# Patient Record
Sex: FEMALE | Race: WHITE | NOT HISPANIC OR LATINO | Employment: PART TIME | URBAN - METROPOLITAN AREA
[De-identification: names, ages, dates, MRNs, and addresses within clinical notes are randomized per-mention and may not be internally consistent; named-entity substitution may affect disease eponyms.]

---

## 2024-02-19 ENCOUNTER — APPOINTMENT (EMERGENCY)
Dept: RADIOLOGY | Facility: HOSPITAL | Age: 59
DRG: 269 | End: 2024-02-19
Payer: COMMERCIAL

## 2024-02-19 ENCOUNTER — APPOINTMENT (EMERGENCY)
Dept: NON INVASIVE DIAGNOSTICS | Facility: HOSPITAL | Age: 59
DRG: 269 | End: 2024-02-19
Payer: COMMERCIAL

## 2024-02-19 ENCOUNTER — HOSPITAL ENCOUNTER (INPATIENT)
Facility: HOSPITAL | Age: 59
LOS: 7 days | Discharge: HOME | DRG: 269 | End: 2024-02-26
Attending: EMERGENCY MEDICINE | Admitting: INTERNAL MEDICINE
Payer: COMMERCIAL

## 2024-02-19 ENCOUNTER — APPOINTMENT (INPATIENT)
Dept: CT IMAGING | Facility: HOSPITAL | Age: 59
DRG: 269 | End: 2024-02-19
Payer: COMMERCIAL

## 2024-02-19 DIAGNOSIS — M79.674 GREAT TOE PAIN, RIGHT: Primary | ICD-10-CM

## 2024-02-19 DIAGNOSIS — I10 HTN (HYPERTENSION): ICD-10-CM

## 2024-02-19 DIAGNOSIS — I99.8 ISCHEMIA OF TOE: ICD-10-CM

## 2024-02-19 DIAGNOSIS — I73.9 PAD (PERIPHERAL ARTERY DISEASE) (HCC): ICD-10-CM

## 2024-02-19 DIAGNOSIS — F17.200 SMOKER: ICD-10-CM

## 2024-02-19 DIAGNOSIS — E11.9 TYPE 2 DIABETES MELLITUS, WITHOUT LONG-TERM CURRENT USE OF INSULIN (HCC): ICD-10-CM

## 2024-02-19 PROBLEM — Z72.0 TOBACCO ABUSE: Status: ACTIVE | Noted: 2024-02-19

## 2024-02-19 PROBLEM — R73.09 ELEVATED GLUCOSE LEVEL: Status: ACTIVE | Noted: 2024-02-19

## 2024-02-19 LAB
ALBUMIN SERPL BCP-MCNC: 4.1 G/DL (ref 3.5–5)
ALP SERPL-CCNC: 102 U/L (ref 34–104)
ALT SERPL W P-5'-P-CCNC: 11 U/L (ref 7–52)
ANION GAP SERPL CALCULATED.3IONS-SCNC: 9 MMOL/L
AST SERPL W P-5'-P-CCNC: 9 U/L (ref 13–39)
ATRIAL RATE: 100 BPM
BASOPHILS # BLD AUTO: 0.08 THOUSANDS/ÂΜL (ref 0–0.1)
BASOPHILS NFR BLD AUTO: 1 % (ref 0–1)
BILIRUB SERPL-MCNC: 0.25 MG/DL (ref 0.2–1)
BUN SERPL-MCNC: 10 MG/DL (ref 5–25)
CALCIUM SERPL-MCNC: 9.4 MG/DL (ref 8.4–10.2)
CHLORIDE SERPL-SCNC: 102 MMOL/L (ref 96–108)
CHOLEST SERPL-MCNC: 162 MG/DL
CO2 SERPL-SCNC: 27 MMOL/L (ref 21–32)
CREAT SERPL-MCNC: 0.62 MG/DL (ref 0.6–1.3)
EOSINOPHIL # BLD AUTO: 0.14 THOUSAND/ÂΜL (ref 0–0.61)
EOSINOPHIL NFR BLD AUTO: 1 % (ref 0–6)
ERYTHROCYTE [DISTWIDTH] IN BLOOD BY AUTOMATED COUNT: 13.4 % (ref 11.6–15.1)
EST. AVERAGE GLUCOSE BLD GHB EST-MCNC: 306 MG/DL
GFR SERPL CREATININE-BSD FRML MDRD: 98 ML/MIN/1.73SQ M
GLUCOSE SERPL-MCNC: 223 MG/DL (ref 65–140)
HBA1C MFR BLD: 12.3 %
HCT VFR BLD AUTO: 42.6 % (ref 34.8–46.1)
HDLC SERPL-MCNC: 43 MG/DL
HGB BLD-MCNC: 13.3 G/DL (ref 11.5–15.4)
IMM GRANULOCYTES # BLD AUTO: 0.04 THOUSAND/UL (ref 0–0.2)
IMM GRANULOCYTES NFR BLD AUTO: 0 % (ref 0–2)
LDLC SERPL CALC-MCNC: 78 MG/DL (ref 0–100)
LYMPHOCYTES # BLD AUTO: 2.76 THOUSANDS/ÂΜL (ref 0.6–4.47)
LYMPHOCYTES NFR BLD AUTO: 23 % (ref 14–44)
MCH RBC QN AUTO: 26.1 PG (ref 26.8–34.3)
MCHC RBC AUTO-ENTMCNC: 31.2 G/DL (ref 31.4–37.4)
MCV RBC AUTO: 84 FL (ref 82–98)
MONOCYTES # BLD AUTO: 0.73 THOUSAND/ÂΜL (ref 0.17–1.22)
MONOCYTES NFR BLD AUTO: 6 % (ref 4–12)
NEUTROPHILS # BLD AUTO: 8.48 THOUSANDS/ÂΜL (ref 1.85–7.62)
NEUTS SEG NFR BLD AUTO: 69 % (ref 43–75)
NRBC BLD AUTO-RTO: 0 /100 WBCS
P AXIS: 68 DEGREES
PLATELET # BLD AUTO: 472 THOUSANDS/UL (ref 149–390)
PMV BLD AUTO: 10.2 FL (ref 8.9–12.7)
POTASSIUM SERPL-SCNC: 3.9 MMOL/L (ref 3.5–5.3)
PR INTERVAL: 164 MS
PROT SERPL-MCNC: 8.1 G/DL (ref 6.4–8.4)
QRS AXIS: 88 DEGREES
QRSD INTERVAL: 90 MS
QT INTERVAL: 374 MS
QTC INTERVAL: 482 MS
RBC # BLD AUTO: 5.09 MILLION/UL (ref 3.81–5.12)
SODIUM SERPL-SCNC: 138 MMOL/L (ref 135–147)
T WAVE AXIS: 64 DEGREES
TRIGL SERPL-MCNC: 204 MG/DL
VENTRICULAR RATE: 100 BPM
WBC # BLD AUTO: 12.23 THOUSAND/UL (ref 4.31–10.16)

## 2024-02-19 PROCEDURE — 99222 1ST HOSP IP/OBS MODERATE 55: CPT

## 2024-02-19 PROCEDURE — 80061 LIPID PANEL: CPT | Performed by: STUDENT IN AN ORGANIZED HEALTH CARE EDUCATION/TRAINING PROGRAM

## 2024-02-19 PROCEDURE — 83036 HEMOGLOBIN GLYCOSYLATED A1C: CPT | Performed by: STUDENT IN AN ORGANIZED HEALTH CARE EDUCATION/TRAINING PROGRAM

## 2024-02-19 PROCEDURE — 73660 X-RAY EXAM OF TOE(S): CPT

## 2024-02-19 PROCEDURE — 36415 COLL VENOUS BLD VENIPUNCTURE: CPT

## 2024-02-19 PROCEDURE — 85025 COMPLETE CBC W/AUTO DIFF WBC: CPT

## 2024-02-19 PROCEDURE — 93926 LOWER EXTREMITY STUDY: CPT

## 2024-02-19 PROCEDURE — 93010 ELECTROCARDIOGRAM REPORT: CPT

## 2024-02-19 PROCEDURE — NC001 PR NO CHARGE: Performed by: PODIATRIST

## 2024-02-19 PROCEDURE — 99255 IP/OBS CONSLTJ NEW/EST HI 80: CPT | Performed by: SURGERY

## 2024-02-19 PROCEDURE — 99223 1ST HOSP IP/OBS HIGH 75: CPT | Performed by: STUDENT IN AN ORGANIZED HEALTH CARE EDUCATION/TRAINING PROGRAM

## 2024-02-19 PROCEDURE — 99284 EMERGENCY DEPT VISIT MOD MDM: CPT

## 2024-02-19 PROCEDURE — 75635 CT ANGIO ABDOMINAL ARTERIES: CPT

## 2024-02-19 PROCEDURE — 80053 COMPREHEN METABOLIC PANEL: CPT

## 2024-02-19 PROCEDURE — 99285 EMERGENCY DEPT VISIT HI MDM: CPT

## 2024-02-19 PROCEDURE — 93005 ELECTROCARDIOGRAM TRACING: CPT

## 2024-02-19 RX ORDER — ACETAMINOPHEN 325 MG/1
975 TABLET ORAL EVERY 8 HOURS PRN
Status: DISCONTINUED | OUTPATIENT
Start: 2024-02-19 | End: 2024-02-20

## 2024-02-19 RX ORDER — CLOPIDOGREL BISULFATE 75 MG/1
75 TABLET ORAL DAILY
Status: DISCONTINUED | OUTPATIENT
Start: 2024-02-19 | End: 2024-02-26 | Stop reason: HOSPADM

## 2024-02-19 RX ORDER — ASPIRIN 81 MG/1
81 TABLET, CHEWABLE ORAL DAILY
Status: DISCONTINUED | OUTPATIENT
Start: 2024-02-19 | End: 2024-02-21

## 2024-02-19 RX ORDER — POLYETHYLENE GLYCOL 3350 17 G/17G
17 POWDER, FOR SOLUTION ORAL DAILY PRN
Status: DISCONTINUED | OUTPATIENT
Start: 2024-02-19 | End: 2024-02-26 | Stop reason: HOSPADM

## 2024-02-19 RX ORDER — OXYCODONE HYDROCHLORIDE 5 MG/1
5 TABLET ORAL EVERY 6 HOURS PRN
Status: DISCONTINUED | OUTPATIENT
Start: 2024-02-19 | End: 2024-02-26 | Stop reason: HOSPADM

## 2024-02-19 RX ORDER — ENOXAPARIN SODIUM 100 MG/ML
40 INJECTION SUBCUTANEOUS EVERY 12 HOURS SCHEDULED
Status: DISCONTINUED | OUTPATIENT
Start: 2024-02-20 | End: 2024-02-21

## 2024-02-19 RX ORDER — NICOTINE 21 MG/24HR
1 PATCH, TRANSDERMAL 24 HOURS TRANSDERMAL DAILY
Status: DISCONTINUED | OUTPATIENT
Start: 2024-02-19 | End: 2024-02-26 | Stop reason: HOSPADM

## 2024-02-19 RX ORDER — ONDANSETRON 2 MG/ML
4 INJECTION INTRAMUSCULAR; INTRAVENOUS EVERY 6 HOURS PRN
Status: DISCONTINUED | OUTPATIENT
Start: 2024-02-19 | End: 2024-02-26 | Stop reason: HOSPADM

## 2024-02-19 RX ADMIN — IOHEXOL 120 ML: 350 INJECTION, SOLUTION INTRAVENOUS at 17:22

## 2024-02-19 RX ADMIN — CLOPIDOGREL BISULFATE 75 MG: 75 TABLET ORAL at 13:02

## 2024-02-19 RX ADMIN — ASPIRIN 81 MG CHEWABLE TABLET 81 MG: 81 TABLET CHEWABLE at 13:02

## 2024-02-19 RX ADMIN — ACETAMINOPHEN 325MG 975 MG: 325 TABLET ORAL at 20:52

## 2024-02-19 RX ADMIN — OXYCODONE HYDROCHLORIDE 5 MG: 5 TABLET ORAL at 23:05

## 2024-02-19 NOTE — CONSULTS
"[facility] Podiatry - Consultation      Patient Information:   Georgie King 59 y.o. female MRN: [id number]  Unit/Bed#: ED-05 Encounter: [id number]  PCP: No primary care provider on file.  Date of Admission:  2/19/2024  Date of Consultation: 02/19/24  Requesting Physician: Turner Rosales MD      ASSESSMENT:    Georgie King is a 59 y.o. female with:    Cold painful right great toe   Possible CLTI  History of smoking for past 50 years    PLAN:    No capillary refill to the right great toe, progressing pain over the past 2 weeks, with no pulses, and delayed capillary refill to the forefoot, dusky changes to the 1st, 5th digits and ball of the foot.   Consult to Vascular surgery  Xray: wet read is negative  STAT: Arterial Studies are ordered  Warm compress applied to the digit  Rest of care per primary team.    Weight Bearing Status: WBAT    SUBJECTIVE    History of Present Illness:    Georgie King is a 59 y.o. female with past medical history significant for no PMH, is admitted for painful right great toe. This has been hurting more and more over the past 2 weeks. Finally the pain became unbearable and patient's  encouraged her to come in to the hospital for evaluation.  Pain is isolated to the right great toe.  This has never happened before for the patient.  And denies having a family history of Raynaud's.  Denies any fever, nausea, vomiting, chills.  Patient does confirm that she is smoked since she was 11 years old but states repeatedly that it was \"controlled smoking\" with less than a pack a day.      Review of Systems:    Constitutional: Negative.    HENT: Negative.    Eyes: Negative.    Respiratory: Negative.    Cardiovascular: Negative.    Gastrointestinal: Negative.    Musculoskeletal: Pain right great toe  Skin: Mottled changes right foot  Neurological: Negative  Psych: Negative.     Past Medical and Surgical History:     History reviewed. No pertinent past medical history.    Past Surgical " "History:   Procedure Laterality Date    APPENDECTOMY         Meds/Allergies:    (Not in a hospital admission)      Not on File    Social History:     Marital Status: /Civil Union    Substance Use History:   Social History     Substance and Sexual Activity   Alcohol Use Not Currently    Comment: occ     Social History     Tobacco Use   Smoking Status Every Day    Current packs/day: 0.50    Types: Cigarettes   Smokeless Tobacco Never     Social History     Substance and Sexual Activity   Drug Use Never       Family History:    History reviewed. No pertinent family history.      OBJECTIVE:    Vitals:   Blood Pressure: 165/80 (02/19/24 1008)  Pulse: 105 (02/19/24 1008)  Temperature: 97.6 °F (36.4 °C) (02/19/24 0944)  Temp Source: Oral (02/19/24 0944)  Respirations: 18 (02/19/24 1008)  Height: 5' 1\" (154.9 cm) (02/19/24 0942)  Weight - Scale: 117 kg (257 lb 15 oz) (02/19/24 0942)  SpO2: 98 % (02/19/24 1008)    Physical Exam:     General Appearance: Alert, cooperative, no distress.  HEENT: Head normocephalic, atraumatic, without obvious abnormality.  Heart: Normal rate and rhythm.  Lungs: Non-labored breathing. No respiratory distress.  Abdomen: Without distension.  Psychiatric: AAOx3  Lower Extremity:  Vascular:   Nonpalpable DP/PT pulses bilateral. DP/PT doppler signal at the level of the ankle.   Severely delayed capillary refill at the plantar aspect of the foot.  No capillary fill noted to the great toe on the right side  Cool to the touch the right great toe  The mottled skin changes purple in color to the fifth and first digit as well as the ball of the foot.      Musculoskeletal:   Pain with light and firm palpation to the third digit  No gross osseous abnormality      Neurological:   Hypersensitivity to the great toe normal sensation otherwise to the bilateral feet epicritic, and protective and sharp dull discrimination intact      Dermatological:   No open ulcerations are noted.  Skin is smooth and supple " "and intact with the exception of the right great toe, right fifth toe plantar ball of the foot to the right side.  There is some mottled, none purple changes to the foot with white changes to the right great toe.    Wounds and Ulcerations noted as follows:      Clinical Images 02/19/24:            Additional Data:     Lab Results: I have personally reviewed pertinent labs including:    Results from last 7 days   Lab Units 02/19/24  1006   WBC Thousand/uL 12.23*   HEMOGLOBIN g/dL 13.3   HEMATOCRIT % 42.6   PLATELETS Thousands/uL 472*   NEUTROS PCT % 69   LYMPHS PCT % 23   MONOS PCT % 6   EOS PCT % 1           Invalid input(s): \"LABALBU\"        Cultures: I have personally reviewed pertinent cultures including:              Imaging: I have personally reviewed pertinent films in PACS.  EKG, Pathology, and Other Studies: I have personally reviewed pertinent reports.       ** Please Note: Portions of the record may have been created with voice recognition software. Occasional wrong word or \"sound a like\" substitutions may have occurred due to the inherent limitations of voice recognition software. Read the chart carefully and recognize, using context, where substitutions have occurred. **    "

## 2024-02-19 NOTE — ASSESSMENT & PLAN NOTE
58 yo female smoker w/ no past medical history presented to [facility] on 2/19/24 w/ R toe pain. Pt reports R great toe pain started two weeks ago and has been slowly increasing until last night, when the pain became unbearable. A few days ago, the toe started to swell and become discolored. Pain is isolated to the R great toe w/ intact motor and sensation.    Vascular surgery consulted for R great toe pain. LEAD showed tibioperoneal disease w/ occlusions vs high grade stenoses in the R AT, PT and peroneal arteries, likely secondary to atheroemboli from distal aorta or SFA.      Diagnostics:  -2/19/24 R great toe xray: Mild soft tissue swelling overlying the first toe with no acute osseous abnormality.   -2/19/24 LEAD: Right: Evidence of diffuse arterial occlusive disease throughout the femoropopliteal segments without focal stenosis. Evidence of tibioperoneal disease with occlusions vs high grade stenoses in the anterior tibial, posterior tibial and peroneal arteries. R EVELIA: 1.39/-/- (poorly compressible vessels). Left: L EVELIA: 1.32/220/110.  -2/19/24 CTA abdomen w/ runoff: Large volume of noncalcific atherosclerotic plaque involving the distal infrarenal aorta with aortic luminal narrowing. R SFA disease related to noncalcific plaque. The tibial runoff is poorly evaluated given venous contamination; however, the tibial vessels do appear intact with faint filling of the plantar artery. The dorsalis pedis is not well visualized. No significant left lower extremity arterial disease. Splenic artery occlusion, likely chronic. No evidence of splenic infarct. There is decreased arterial mottling suggestive of decreased perfusion.    Plan:  -R great toe ischemia and pain without sensory/motor deficits  -LEAD showed tibioperoneal disease w/ occlusions vs high grade stenoses in the R AT, PT and peroneal arteries; R EVELIA: 1.39/-/-  -IR consulted for RLE angiogram w/ possible intervention; timing TBD pending IR schedule  -Continue  ASA, plavix and lipitor for medical management of PAD  -Cardiology following to rule out cardioembolic source; input appreciated  -Smoking cessation  -Discussed w/ Dr. Hayes

## 2024-02-19 NOTE — CONSULTS
Cardiology Consultation  MD Sherman Corral MD, FACC  Owen Espinoza DO, FACC, FASE, FACP Ather Mansoor, MD Rujul Patel, DO, FACC Michael Summers, DO, FACC, FASNC  ----------------------------------------------------------------  [address] [address]    Georgie King 59 y.o. female MRN: [id number]  Unit/Bed#: [address] 206-01 Encounter: [id number]      02/19/24    Referring Physician: Pastor Sandra MD    Chief Complain/Reason for Referal: Right toe ischemia, evaluate for cardioembolic source    IMPRESSION:  Acute right toe ischemia  Peripheral arterial disease with diffuse arterial occlusive disease throughout the femoral-popliteal segment of the right leg with occlusions versus high-grade stenosis in the anterior tibial posterior tibial and peroneal arteries.  Poorly compressible vessels.  Obesity  Xanthelasmas bilateral eyelids  Active smoker about half pack a day      DISCUSSION/RECOMMENDATIONS:  She presents to the hospital with findings concerning for acute right toe ischemia  Vascular ultrasounds show severe arterial disease right lower extremity  Rhythm is sinus  She has xanthelasmas bilateral eyelids.  Will check lipid panel  Given concern for cardioembolic thrombus, will check echocardiogram, continue with cardiac telemetry.  Given the disease seen in her lower extremities however, may not be cardioembolic she has significant calcified peripheral arterial disease.  Likely will need ultrasound abdominal aorta given her age, current presentation and active smoking status to assess for degree of calcification/aortic plaque.  Continue with dual complete therapy with aspirin Plavix  Vascular surgery evaluation pending  Immediate smoking cessation is recommended    Linwood Foley DO, FACC, FASNC    ----------------------------------------------------  EKG: Sinus rhythm  TELE: Sinus rhythm    ECHO:  Pending    ======================================================    HPI:  I am seeing this patient in cardiology consultation for: Right toe thrombosis/severe PAD    Georgie King is a 59 y.o. female with:   Obesity  Smoking history    She presents to the emergency department with worsening right toe pain.  She has no significant past medical history, which she is active tobacco user, she has not seen a doctor for many years.  She developed right great toe pain about 2 weeks ago and due to worsening pain and discoloration decided to seek medical attention today.  Her toe was noted to be dusky, no palpable pulses.  Was referred for admission and evaluation with vascular surgery given concern for acute limb threatening ischemia.  She had vascular ultrasounds which show diffuse disease right lower extremity with high-grade stenosis versus occlusion of all 3 distal right lower extremity vessels.    She denies any type of cardiac history.  Never gets chest pain or significant shortness of breath no.  Never chest pressure, no symptoms of palpitations lightheadedness dizziness syncope or presyncope.  Her ECG shows sinus rhythm    History reviewed. No pertinent past medical history.      Scheduled Meds:  Current Facility-Administered Medications   Medication Dose Route Frequency Provider Last Rate    aspirin  81 mg Oral Daily Maira Reyes PA-C      clopidogrel  75 mg Oral Daily Maira Reyes PA-C      [START ON 2/20/2024] enoxaparin  40 mg Subcutaneous Q12H formerly [facility] Pastor Sandra MD      nicotine  1 patch Transdermal Daily Pastor Sandra MD       Continuous Infusions:   PRN Meds:.  No Known Allergies  I reviewed the Home Medication list in the chart.     History reviewed. No pertinent family history.    Social History     Socioeconomic History    Marital status: /Civil Union     Spouse name: Not on file    Number of children: Not on file    Years of education: Not on file    Highest education level: Not  on file   Occupational History    Not on file   Tobacco Use    Smoking status: Every Day     Current packs/day: 0.50     Types: Cigarettes    Smokeless tobacco: Never   Substance and Sexual Activity    Alcohol use: Not Currently     Comment: occ    Drug use: Never    Sexual activity: Not on file   Other Topics Concern    Not on file   Social History Narrative    Not on file     Social Determinants of Health     Financial Resource Strain: Not on file   Food Insecurity: Not on file   Transportation Needs: Not on file   Physical Activity: Not on file   Stress: Not on file   Social Connections: Not on file   Intimate Partner Violence: Not on file   Housing Stability: Not on file       Review of Systems   Review of Systems   Constitutional:  Negative for chills and fever.   HENT:  Negative for facial swelling and sore throat.    Eyes:  Negative for visual disturbance.   Respiratory:  Negative for cough, chest tightness, shortness of breath and wheezing.    Cardiovascular:  Negative for chest pain, palpitations and leg swelling.   Gastrointestinal:  Negative for abdominal pain, blood in stool, constipation, diarrhea, nausea and vomiting.   Endocrine: Negative for cold intolerance and heat intolerance.   Genitourinary:  Negative for decreased urine volume, difficulty urinating, dysuria and hematuria.   Musculoskeletal:  Positive for gait problem. Negative for arthralgias, back pain and myalgias.   Skin:  Positive for color change (r lower extremity) and pallor. Negative for rash.   Neurological:  Negative for dizziness, syncope, weakness and numbness.   Psychiatric/Behavioral:  Negative for agitation, behavioral problems and confusion. The patient is not nervous/anxious.       Vitals:    02/19/24 1551   BP:    Pulse:    Resp:    Temp:    SpO2: 96%     I/O       None          Weight (last 2 days)       Date/Time Weight    02/19/24 0942 117 (257.94)            Physical Exam  Constitutional: awake, alert and oriented, in no  "acute distress, no obvious deformities, obese female  Head: Normocephalic, without obvious abnormality, atraumatic  Eyes: conjunctivae clear and moist. Sclera anicteric. No xanthelasmas. Pupils equal bilaterally. Extraocular motions are full.  Ear nose mouth and throat: ears are symmetrical bilaterally, hearing appears to be equal bilaterally, no nasal discharge or epistaxis, oropharynx is clear with moist mucous membranes  Neck: Trachea is midline, neck is supple, no thyromegaly or significant lymphadenopathy, there is full range of motion.  Lungs: clear to auscultation bilaterally, no wheezes, no rales, no rhonchi, no accessory muscle use, breathing is nonlabored  Heart: Regular rhythm with a Normal heart rate, S1, S2 normal, No Murmur, no click, rub or gallop, No lower extremity edema  Abdomen: Obese, soft, non-tender; bowel sounds normal; no masses, no organomegaly  Psychiatric: Patient is oriented to time, place, person, mood/affect is negative for depression, anxiety, agitation, appears to have appropriate insight  Skin: Right toe is dusky and cool.  She has xanthelasmas bilateral eyelids    Results from last 7 days   Lab Units 02/19/24  1006   WBC Thousand/uL 12.23*   HEMOGLOBIN g/dL 13.3   HEMATOCRIT % 42.6   PLATELETS Thousands/uL 472*   NEUTROS PCT % 69   MONOS PCT % 6   EOS PCT % 1     Results from last 7 days   Lab Units 02/19/24  1155   POTASSIUM mmol/L 3.9   CHLORIDE mmol/L 102   CO2 mmol/L 27   BUN mg/dL 10   CREATININE mg/dL 0.62   CALCIUM mg/dL 9.4     Results from last 7 days   Lab Units 02/19/24  1155   POTASSIUM mmol/L 3.9   CHLORIDE mmol/L 102   CO2 mmol/L 27   BUN mg/dL 10   CREATININE mg/dL 0.62   CALCIUM mg/dL 9.4   ALK PHOS U/L 102   ALT U/L 11   AST U/L 9*     No results found for: \"TROPONINT\"                            I have personally reviewed the EKG, CXR and Telemetry images directly.      Patient Active Problem List    Diagnosis Date Noted    PAD (peripheral artery disease) (HCC) " "02/19/2024    Tobacco abuse 02/19/2024    Elevated glucose level 02/19/2024       Portions of the record may have been created with voice recognition software. Occasional wrong word or \"sound a like\" substitutions may have occurred due to the inherent limitations of voice recognition software. Read the chart carefully and recognize, using context, where substitutions have occurred.    Linwood Foley DO, [facility], [facility]  2/19/2024 4:05 PM            "

## 2024-02-19 NOTE — ASSESSMENT & PLAN NOTE
59 year old female presented to our ED due to two weeks of right great toe pain and discoloration. Concern for chronic limb threatening ischemia.  Appreciate podiatry recommendations  Awaiting vascular surgery recommendations. So far, they recommending DAPT. No indications for AC at this time  Cardiology was consulted per vascular surgery due to concerns for potential thromboembolic disease. Telemetry requested.

## 2024-02-19 NOTE — H&P
[facility]  H&P  Name: Georgie King 59 y.o. female I MRN: [id number]  Unit/Bed#: ED-05 I Date of Admission: 2/19/2024   Date of Service: 2/19/2024 I Hospital Day: 0      Assessment/Plan   * PAD (peripheral artery disease) (HCC)  Assessment & Plan  59 year old female presented to our ED due to two weeks of right great toe pain and discoloration. Concern for chronic limb threatening ischemia.  Appreciate podiatry recommendations  Awaiting vascular surgery recommendations. So far, they recommending DAPT. No indications for AC at this time  Cardiology was consulted per vascular surgery due to concerns for potential thromboembolic disease. Telemetry requested.    Elevated glucose level  Assessment & Plan  Has not seen a doctor for a while.  Hemoglobin A1c and lipid profile Added on    Tobacco abuse  Assessment & Plan  Counseled. Nicotine patch for now           VTE Prophylaxis: Enoxaparin (Lovenox)  / sequential compression device   Code Status: full code  Discussion with family:     Anticipated Length of Stay:  Patient will be admitted on an Inpatient basis with an anticipated length of stay of  > 2 midnights.   Justification for Hospital Stay: LEADS, vascular eval, potential intervention    Chief Complaint:   right great toe pain    History of Present Illness:    Georgie King is a 59 y.o. female who presents with right great toe pain.    Patient is a 59 year old female with a history of tobacco abuse and no known medical comorbidities presented to our ED due to right great toe pain. She has not seen a doctor for a while for yearly maintenance. She reports develop right great toe pain two weeks prior to admission. She works as a  in the [employer] and would be on her feet all day long. Due to worsening right great toe pain and discoloration, she decided to seek medical attention today. Podiatry evaluated her today and recommended vascular surgery intervention. Vascular  "surgery recommends LEADs, DAPT, and potential vascular surgery intervention. She currently denies any fever or chills.    Review of Systems:    Review of Systems   Constitutional:  Negative for fatigue and fever.   HENT:  Negative for congestion.    Respiratory:  Negative for cough and shortness of breath.    Gastrointestinal:  Negative for abdominal pain, diarrhea and nausea.   Genitourinary:  Negative for dysuria.   Skin:  Positive for color change. Negative for wound.   Neurological:  Negative for headaches.   Psychiatric/Behavioral:  Negative for agitation and confusion.        Past Medical and Surgical History:     History reviewed. No pertinent past medical history.    Past Surgical History:   Procedure Laterality Date    APPENDECTOMY         Meds/Allergies:    Prior to Admission medications    Not on File     I have reviewed home medications with patient personally.    Allergies: Not on File    Social History:     Marital Status: /Civil Union   Substance Use History:   Social History     Substance and Sexual Activity   Alcohol Use Not Currently    Comment: occ     Social History     Tobacco Use   Smoking Status Every Day    Current packs/day: 0.50    Types: Cigarettes   Smokeless Tobacco Never     Social History     Substance and Sexual Activity   Drug Use Never       Family History:    History reviewed. No pertinent family history.    Physical Exam:     Vitals:   Blood Pressure: 147/67 (02/19/24 1300)  Pulse: 91 (02/19/24 1300)  Temperature: 97.6 °F (36.4 °C) (02/19/24 0944)  Temp Source: Oral (02/19/24 0944)  Respirations: 16 (02/19/24 1300)  Height: 5' 1\" (154.9 cm) (02/19/24 0942)  Weight - Scale: 117 kg (257 lb 15 oz) (02/19/24 0942)  SpO2: 94 % (02/19/24 1300)    Physical Exam  Vitals reviewed.   Constitutional:       General: She is not in acute distress.  HENT:      Head: Normocephalic.      Nose: Nose normal.      Mouth/Throat:      Mouth: Mucous membranes are moist.   Eyes:      General: No " scleral icterus.  Cardiovascular:      Rate and Rhythm: Normal rate and regular rhythm.   Pulmonary:      Effort: Pulmonary effort is normal. No respiratory distress.   Abdominal:      General: There is no distension.      Palpations: Abdomen is soft.      Tenderness: There is no abdominal tenderness.   Skin:     General: Skin is warm.   Neurological:      Mental Status: She is alert and oriented to person, place, and time.   Psychiatric:         Mood and Affect: Mood normal.         Behavior: Behavior normal.       Additional Data:     Lab Results: I have personally reviewed pertinent reports.      Results from last 7 days   Lab Units 02/19/24  1006   WBC Thousand/uL 12.23*   HEMOGLOBIN g/dL 13.3   HEMATOCRIT % 42.6   PLATELETS Thousands/uL 472*   NEUTROS PCT % 69   LYMPHS PCT % 23   MONOS PCT % 6   EOS PCT % 1     Results from last 7 days   Lab Units 02/19/24  1155   SODIUM mmol/L 138   POTASSIUM mmol/L 3.9   CHLORIDE mmol/L 102   CO2 mmol/L 27   BUN mg/dL 10   CREATININE mg/dL 0.62   ANION GAP mmol/L 9   CALCIUM mg/dL 9.4   ALBUMIN g/dL 4.1   TOTAL BILIRUBIN mg/dL 0.25   ALK PHOS U/L 102   ALT U/L 11   AST U/L 9*   GLUCOSE RANDOM mg/dL 223*                       Imaging: I have personally reviewed pertinent reports.      XR toe great min 2 views RIGHT    (Results Pending)   VAS ARTERIAL DUPLEX-LOWER LIMB UNILATERAL    (Results Pending)       EKG, Pathology, and Other Studies Reviewed on Admission:   EKG: nsr, no acute ischemic changes    Allscripts / Epic Records Reviewed: Yes     ** Please Note: This note has been constructed using a voice recognition system. **

## 2024-02-19 NOTE — ED PROVIDER NOTES
History  Chief Complaint   Patient presents with    Toe Pain     Pt reports pain to right big toe for past 2 weeks. Pt reports toe started to swell and became discolored a couple days ago. Big toe is purple around the top, with a strip of white discoloration underneath. Pt reports toe itches.     59 YOF with no documented or reported PMH presents today with right great toe pain. Pt reports she has had pain for about 2 weeks but it has worsened overnight. Reports she was barely able to sleep last night due to the pain. Also reporting swelling and discoloration which appeared overnight. Denies any known injury. Reports she is a smoker.         None       History reviewed. No pertinent past medical history.    Past Surgical History:   Procedure Laterality Date    APPENDECTOMY         History reviewed. No pertinent family history.  I have reviewed and agree with the history as documented.    E-Cigarette/Vaping     E-Cigarette/Vaping Substances     Social History     Tobacco Use    Smoking status: Every Day     Current packs/day: 0.50     Types: Cigarettes    Smokeless tobacco: Never   Substance Use Topics    Alcohol use: Not Currently     Comment: occ    Drug use: Never       Review of Systems   Constitutional:  Negative for chills, fever and unexpected weight change.   Genitourinary:  Negative for dysuria, frequency and hematuria.   Musculoskeletal:  Positive for arthralgias.   All other systems reviewed and are negative.      Physical Exam  Physical Exam  Vitals and nursing note reviewed.   Constitutional:       General: She is not in acute distress.     Appearance: Normal appearance. She is well-developed. She is obese. She is not ill-appearing.   HENT:      Head: Normocephalic and atraumatic.   Eyes:      Conjunctiva/sclera: Conjunctivae normal.   Cardiovascular:      Rate and Rhythm: Normal rate.   Pulmonary:      Effort: Pulmonary effort is normal.   Musculoskeletal:         General: Swelling present. Normal range  of motion.      Cervical back: Normal range of motion and neck supple.   Feet:      Comments: Right great toe: No capillary refill to the right great toe, progressing pain over the past 2 weeks, with no pulses, and delayed capillary refill to the forefoot, dusky changes to the 1st, 5th digits and ball of the foot    Skin:     General: Skin is warm and dry.      Capillary Refill: Capillary refill takes less than 2 seconds.   Neurological:      Mental Status: She is alert.   Psychiatric:         Mood and Affect: Mood normal.         Behavior: Behavior normal.                 Vital Signs  ED Triage Vitals   Temperature Pulse Respirations Blood Pressure SpO2   02/19/24 0944 02/19/24 0942 02/19/24 0942 02/19/24 0942 02/19/24 0942   97.6 °F (36.4 °C) (!) 121 18 (!) 208/95 97 %      Temp Source Heart Rate Source Patient Position - Orthostatic VS BP Location FiO2 (%)   02/19/24 0944 02/19/24 0942 02/19/24 0942 02/19/24 0942 --   Oral Monitor Sitting Right arm       Pain Score       02/19/24 1300       6           Vitals:    02/19/24 0942 02/19/24 1008 02/19/24 1200 02/19/24 1300   BP: (!) 208/95 165/80 157/71 147/67   Pulse: (!) 121 105 102 91   Patient Position - Orthostatic VS: Sitting Lying Sitting Sitting         Visual Acuity      ED Medications  Medications   clopidogrel (PLAVIX) tablet 75 mg (75 mg Oral Given 2/19/24 1302)   aspirin chewable tablet 81 mg (81 mg Oral Given 2/19/24 1302)       Diagnostic Studies  Results Reviewed       Procedure Component Value Units Date/Time    Comprehensive metabolic panel [[id number]]  (Abnormal) Collected: 02/19/24 1155    Lab Status: Final result Specimen: Blood from Arm, Left Updated: 02/19/24 1254     Sodium 138 mmol/L      Potassium 3.9 mmol/L      Chloride 102 mmol/L      CO2 27 mmol/L      ANION GAP 9 mmol/L      BUN 10 mg/dL      Creatinine 0.62 mg/dL      Glucose 223 mg/dL      Calcium 9.4 mg/dL      AST 9 U/L      ALT 11 U/L      Alkaline Phosphatase 102 U/L      Total  Protein 8.1 g/dL      Albumin 4.1 g/dL      Total Bilirubin 0.25 mg/dL      eGFR 98 ml/min/1.73sq m     Narrative:      National Kid[employer] Foundation guidelines for Chronic Kidney Disease (CKD):     Stage 1 with normal or high GFR (GFR > 90 mL/min/1.73 square meters)    Stage 2 Mild CKD (GFR = 60-89 mL/min/1.73 square meters)    Stage 3A Moderate CKD (GFR = 45-59 mL/min/1.73 square meters)    Stage 3B Moderate CKD (GFR = 30-44 mL/min/1.73 square meters)    Stage 4 Severe CKD (GFR = 15-29 mL/min/1.73 square meters)    Stage 5 End Stage CKD (GFR <15 mL/min/1.73 square meters)  Note: GFR calculation is accurate only with a steady state creatinine    CBC and differential [[id number]]  (Abnormal) Collected: 02/19/24 1006    Lab Status: Final result Specimen: Blood from Arm, Left Updated: 02/19/24 1013     WBC 12.23 Thousand/uL      RBC 5.09 Million/uL      Hemoglobin 13.3 g/dL      Hematocrit 42.6 %      MCV 84 fL      MCH 26.1 pg      MCHC 31.2 g/dL      RDW 13.4 %      MPV 10.2 fL      Platelets 472 Thousands/uL      nRBC 0 /100 WBCs      Neutrophils Relative 69 %      Immat GRANS % 0 %      Lymphocytes Relative 23 %      Monocytes Relative 6 %      Eosinophils Relative 1 %      Basophils Relative 1 %      Neutrophils Absolute 8.48 Thousands/µL      Immature Grans Absolute 0.04 Thousand/uL      Lymphocytes Absolute 2.76 Thousands/µL      Monocytes Absolute 0.73 Thousand/µL      Eosinophils Absolute 0.14 Thousand/µL      Basophils Absolute 0.08 Thousands/µL                    XR toe great min 2 views RIGHT    (Results Pending)   VAS ARTERIAL DUPLEX-LOWER LIMB UNILATERAL    (Results Pending)              Procedures  ECG 12 Lead Documentation Only    Date/Time: 2/19/2024 12:27 PM    Performed by: Maira Reyes PA-C  Authorized by: Maira Reyes PA-C    Patient location:  ED  Previous ECG:     Previous ECG:  Unavailable    Comparison to cardiac monitor: No    Interpretation:     Interpretation: normal     Rate:     ECG rate:  100    ECG rate assessment: tachycardic    Rhythm:     Rhythm: sinus rhythm    Ectopy:     Ectopy: none    QRS:     QRS axis:  Normal    QRS intervals:  Normal  Conduction:     Conduction: normal    ST segments:     ST segments:  Normal  T waves:     T waves: normal    Other findings:     Other findings: prolonged qTc interval    Comments:      Prolonged QT           ED Course  ED Course as of 02/19/24 1308   Mon Feb 19, 2024   1036 Podiatry concerned for arterial occlusion. They placed order for arterial study and placed consult to vascular surgery. I will TT    1043 Vascular will come see pt once they are done with surgery    1152 Vascular surgery: think that pt showered some debris/clot in her toe. Reccomending she be worked up for cardioembolic source and admit to medicine. They recommend ASA and plavix    1159 TT to cardiology   1258 Accepted to [facility]                               SBIRT 20yo+      Flowsheet Row Most Recent Value   Initial Alcohol Screen: US AUDIT-C     1. How often do you have a drink containing alcohol? 1 Filed at: 02/19/2024 0944   2. How many drinks containing alcohol do you have on a typical day you are drinking?  0 Filed at: 02/19/2024 0944   3b. FEMALE Any Age, or MALE 65+: How often do you have 4 or more drinks on one occassion? 0 Filed at: 02/19/2024 0944   Audit-C Score 1 Filed at: 02/19/2024 0944   MICHA: How many times in the past year have you...    Used an illegal drug or used a prescription medication for non-medical reasons? Never Filed at: 02/19/2024 0944                      Medical Decision Making  Podiatry consulted first- they did see pt and were concerned about vascular issue. LEAD ordered and vascular consult. Vascular does not feel that intervention is needed at this point, recommended just ASA and plavix and admission for further work up. Cardiology consulted to work up cardioembolic source.     Pt remained stable under my care.     Problems  Addressed:  Great toe pain, right: acute illness or injury  PAD (peripheral artery disease) (HCC): undiagnosed new problem with uncertain prognosis    Amount and/or Complexity of Data Reviewed  Labs: ordered.  Radiology: ordered.    Risk  OTC drugs.  Prescription drug management.  Decision regarding hospitalization.             Disposition  Final diagnoses:   Great toe pain, right   PAD (peripheral artery disease) (HCC)   Smoker     Time reflects when diagnosis was documented in both MDM as applicable and the Disposition within this note       Time User Action Codes Description Comment    2/19/2024  9:57 AM Maira Reyes Add [M79.674] Great toe pain, right     2/19/2024 12:27 PM Maira Reyes Add [I73.9] PAD (peripheral artery disease) (HCC)     2/19/2024  1:08 PM Maira Reyes Add [F17.200] Smoker           ED Disposition       ED Disposition   Admit    Condition   Stable    Date/Time   Mon Feb 19, 2024 1259    Comment   Case was discussed with STEVE and the patient's admission status was agreed to be Admission Status: inpatient status to the service of Dr. Sandra .               Follow-up Information    None         Patient's Medications    No medications on file       No discharge procedures on file.    PDMP Review       None            ED Provider  Electronically Signed by             Maira Reyes PA-C  02/19/24 2080

## 2024-02-19 NOTE — CONSULTS
Consult Note - Vascular Surgery   Georgie King 59 y.o. female [id number]  Unit/Bed#:[place] [address] -01 Encounter: [id number]      Assessment:    59 y.o. with PMH Tobacco abuse presenting with Right great toe pain, discoloration.     Vascular surgery consulted for right toe pain possible atheroembolic versus cardioembolic, rule out acute limb ischemia    Plan:  -Patient presentation and symptoms consistent with atheroembolic versus cardioembolic event  -No evidence of acute limb ischemia on exam.  There is well-demarcated color changes to the toes most pronounced in the great toe.  -She has intact motor and sensory to the right foot.  -Given that she has palpable DP pulses on the left and now Doppler signals on the right DP and PT, suspect she has had chronically showering atheroembolic events that have trashed her foot.  -Will obtain stat CTA with runoff given evidence of tibial peroneal disease on the lower extremity arterial duplex.  -Recommend aspirin and Plavix  -If suspicion for cardioembolic event would recommend anticoagulation  -Recommend cardio consult for cardioembolic assessment  -Recommend podiatry consult to assess for wound healing and need for intervention as the toe continues to demarcate  -Continue neurovascular monitoring  -No acute vascular surgery interventions planned at this time  -Patient seen and examined with Dr. Healy      Subjective:    Patient presents emergency room for evaluation of color changes in the right hallux.  She states she has had pain ongoing in the toes for 3 weeks.  Over the last week has had worsening color changes and pain.  She denies any weakness or sensory loss to her right foot.  She denies any left leg symptoms.    She denies any prior past medical history however her and her  state that they do not see physicians because all of their physicians have .  They do admit to being lifelong smoker starting at the age of 11.    Denies MI, DVT/PEs, cancer, CVA.   Independent in ADLs    I/Os:  No intake/output data recorded.  No intake/output data recorded.    Review of Systems   All other systems reviewed and are negative.      Vitals:    02/19/24 1551   BP:    Pulse:    Resp:    Temp:    SpO2: 96%        Physical Exam  Vitals and nursing note reviewed.   Constitutional:       General: She is not in acute distress.     Appearance: She is well-developed. She is obese. She is not ill-appearing or diaphoretic.   HENT:      Head: Normocephalic and atraumatic.   Eyes:      Conjunctiva/sclera: Conjunctivae normal.   Cardiovascular:      Rate and Rhythm: Normal rate and regular rhythm.      Heart sounds: No murmur heard.     Comments: B/L Palp Pop  RLE Dop DP/PT  LLE Palp DP  Pulmonary:      Effort: Pulmonary effort is normal. No respiratory distress.      Breath sounds: Normal breath sounds.   Abdominal:      Palpations: Abdomen is soft.      Tenderness: There is no abdominal tenderness. There is no guarding.   Musculoskeletal:         General: No swelling.      Cervical back: Neck supple.      Right lower leg: No edema.      Left lower leg: No edema.   Skin:     General: Skin is warm and dry.      Capillary Refill: Capillary refill takes less than 2 seconds.      Findings: Lesion present. No erythema or rash.      Comments: Right hallux mottling, skin intact, no erythema, no drainage    Neurological:      General: No focal deficit present.      Mental Status: She is alert and oriented to person, place, and time. Mental status is at baseline.      Comments: LLE Motor and sensory intact  RLE motor and sensory intact. Moving all toes    Psychiatric:         Mood and Affect: Mood normal.         Imaging:I have personally reviewed pertinent reports.        Lab Results and Cultures:   Lab Results   Component Value Date    WBC 12.23 (H) 02/19/2024    HGB 13.3 02/19/2024    HCT 42.6 02/19/2024    MCV 84 02/19/2024     (H) 02/19/2024     Lab Results   Component Value Date     "GLUCOSE 111 03/16/2015    CALCIUM 9.4 02/19/2024     03/16/2015    K 3.9 02/19/2024    CO2 27 02/19/2024     02/19/2024    BUN 10 02/19/2024    CREATININE 0.62 02/19/2024     No results found for: \"INR\", \"PROTIME\"     Blood Culture: No results found for: \"BLOODCX\",   Urinalysis:   Lab Results   Component Value Date    COLORU Yellow 03/15/2015    CLARITYU Clear 03/15/2015    SPECGRAV >=1.030 03/15/2015    PHUR 6.0 03/15/2015    LEUKOCYTESUR Negative 03/15/2015    NITRITE Negative 03/15/2015    PROTEINUA 30 (1+) (A) 03/15/2015    GLUCOSEU Negative 03/15/2015    KETONESU 15 (1+) (A) 03/15/2015    BILIRUBINUR Negative 03/15/2015    BLOODU Trace (A) 03/15/2015   ,   Urine Culture: No results found for: \"URINECX\",   Wound Culure: No results found for: \"WOUNDCULT\"    Medications:  Current Facility-Administered Medications   Medication Dose Route Frequency    aspirin chewable tablet 81 mg  81 mg Oral Daily    clopidogrel (PLAVIX) tablet 75 mg  75 mg Oral Daily    [START ON 2/20/2024] enoxaparin (LOVENOX) subcutaneous injection 40 mg  40 mg Subcutaneous Q12H OMID    nicotine (NICODERM CQ) 21 mg/24 hr TD 24 hr patch 1 patch  1 patch Transdermal Daily       Patient Active Problem List   Diagnosis    PAD (peripheral artery disease) (HCC)    Tobacco abuse    Elevated glucose level       Past Surgical History:   Procedure Laterality Date    APPENDECTOMY         History reviewed. No pertinent family history.    Social History     Socioeconomic History    Marital status: /Civil Union     Spouse name: Not on file    Number of children: Not on file    Years of education: Not on file    Highest education level: Not on file   Occupational History    Not on file   Tobacco Use    Smoking status: Every Day     Current packs/day: 0.50     Types: Cigarettes    Smokeless tobacco: Never   Substance and Sexual Activity    Alcohol use: Not Currently     Comment: occ    Drug use: Never    Sexual activity: Not on file   Other " Topics Concern    Not on file   Social History Narrative    Not on file     Social Determinants of Health     Financial Resource Strain: Not on file   Food Insecurity: Not on file   Transportation Needs: Not on file   Physical Activity: Not on file   Stress: Not on file   Social Connections: Not on file   Intimate Partner Violence: Not on file   Housing Stability: Not on file       No Known Allergies

## 2024-02-19 NOTE — PLAN OF CARE
Problem: PAIN - ADULT  Goal: Verbalizes/displays adequate comfort level or baseline comfort level  Description: Interventions:  - Encourage patient to monitor pain and request assistance  - Assess pain using appropriate pain scale  - Administer analgesics based on type and severity of pain and evaluate response  - Implement non-pharmacological measures as appropriate and evaluate response  - Consider cultural and social influences on pain and pain management  - Notify physician/advanced practitioner if interventions unsuccessful or patient reports new pain  Outcome: Progressing     Problem: SAFETY ADULT  Goal: Patient will remain free of falls  Description: INTERVENTIONS:  - Educate patient/family on patient safety including physical limitations  - Instruct patient to call for assistance with activity   - Consult OT/PT to assist with strengthening/mobility   - Keep Call bell within reach  - Keep bed low and locked with side rails adjusted as appropriate  - Keep care items and personal belongings within reach  - Initiate and maintain comfort rounds  Goal: Maintain or return to baseline ADL function  Description: INTERVENTIONS:  -  Assess patient's ability to carry out ADLs; assess patient's baseline for ADL function and identify physical deficits which impact ability to perform ADLs (bathing, care of mouth/teeth, toileting, grooming, dressing, etc.)  - Assess/evaluate cause of self-care deficits   - Assess range of motion  - Assess patient's mobility; develop plan if impaired  - Assess patient's need for assistive devices and provide as appropriate  - Encourage maximum independence but intervene and supervise when necessary  - Involve family in performance of ADLs  - Assess for home care needs following discharge   - Consider OT consult to assist with ADL evaluation and planning for discharge  - Provide patient education as appropriate  Outcome: Progressing  Goal: Maintains/Returns to pre admission functional  level  Description: INTERVENTIONS:  - Perform AM-PAC 6 Click Basic Mobility/ Daily Activity assessment daily.  - Set and communicate daily mobility goal to care team and patient/family/caregiver.   - Collaborate with rehabilitation services on mobility goals if consulted  - Perform Range of Motion 3 times a day.    - Record patient progress and toleration of activity level   Outcome: Progressing

## 2024-02-20 ENCOUNTER — APPOINTMENT (INPATIENT)
Dept: NON INVASIVE DIAGNOSTICS | Facility: HOSPITAL | Age: 59
DRG: 269 | End: 2024-02-20
Payer: COMMERCIAL

## 2024-02-20 PROBLEM — E66.01 MORBID OBESITY (HCC): Status: ACTIVE | Noted: 2024-02-20

## 2024-02-20 PROBLEM — R93.89 ABNORMAL CT SCAN: Status: ACTIVE | Noted: 2024-02-20

## 2024-02-20 PROBLEM — E11.9 TYPE 2 DIABETES MELLITUS, WITHOUT LONG-TERM CURRENT USE OF INSULIN (HCC): Status: ACTIVE | Noted: 2024-02-19

## 2024-02-20 LAB
ALBUMIN SERPL BCP-MCNC: 3.7 G/DL (ref 3.5–5)
ALP SERPL-CCNC: 80 U/L (ref 34–104)
ALT SERPL W P-5'-P-CCNC: 9 U/L (ref 7–52)
ANION GAP SERPL CALCULATED.3IONS-SCNC: 10 MMOL/L
AST SERPL W P-5'-P-CCNC: 8 U/L (ref 13–39)
BASOPHILS # BLD AUTO: 0.08 THOUSANDS/ÂΜL (ref 0–0.1)
BASOPHILS NFR BLD AUTO: 1 % (ref 0–1)
BILIRUB SERPL-MCNC: 0.29 MG/DL (ref 0.2–1)
BSA FOR ECHO PROCEDURE: 2.1 M2
BUN SERPL-MCNC: 10 MG/DL (ref 5–25)
CALCIUM SERPL-MCNC: 9 MG/DL (ref 8.4–10.2)
CHLORIDE SERPL-SCNC: 105 MMOL/L (ref 96–108)
CO2 SERPL-SCNC: 23 MMOL/L (ref 21–32)
CREAT SERPL-MCNC: 0.53 MG/DL (ref 0.6–1.3)
EOSINOPHIL # BLD AUTO: 0.25 THOUSAND/ÂΜL (ref 0–0.61)
EOSINOPHIL NFR BLD AUTO: 2 % (ref 0–6)
ERYTHROCYTE [DISTWIDTH] IN BLOOD BY AUTOMATED COUNT: 13.4 % (ref 11.6–15.1)
GFR SERPL CREATININE-BSD FRML MDRD: 103 ML/MIN/1.73SQ M
GLUCOSE SERPL-MCNC: 107 MG/DL (ref 65–140)
GLUCOSE SERPL-MCNC: 118 MG/DL (ref 65–140)
GLUCOSE SERPL-MCNC: 193 MG/DL (ref 65–140)
GLUCOSE SERPL-MCNC: 200 MG/DL (ref 65–140)
GLUCOSE SERPL-MCNC: 259 MG/DL (ref 65–140)
GLUCOSE SERPL-MCNC: 79 MG/DL (ref 65–140)
HCT VFR BLD AUTO: 40.2 % (ref 34.8–46.1)
HGB BLD-MCNC: 12.6 G/DL (ref 11.5–15.4)
IMM GRANULOCYTES # BLD AUTO: 0.05 THOUSAND/UL (ref 0–0.2)
IMM GRANULOCYTES NFR BLD AUTO: 1 % (ref 0–2)
LYMPHOCYTES # BLD AUTO: 3.22 THOUSANDS/ÂΜL (ref 0.6–4.47)
LYMPHOCYTES NFR BLD AUTO: 30 % (ref 14–44)
MAGNESIUM SERPL-MCNC: 2 MG/DL (ref 1.9–2.7)
MCH RBC QN AUTO: 26.5 PG (ref 26.8–34.3)
MCHC RBC AUTO-ENTMCNC: 31.3 G/DL (ref 31.4–37.4)
MCV RBC AUTO: 85 FL (ref 82–98)
MONOCYTES # BLD AUTO: 0.76 THOUSAND/ÂΜL (ref 0.17–1.22)
MONOCYTES NFR BLD AUTO: 7 % (ref 4–12)
NEUTROPHILS # BLD AUTO: 6.41 THOUSANDS/ÂΜL (ref 1.85–7.62)
NEUTS SEG NFR BLD AUTO: 59 % (ref 43–75)
NRBC BLD AUTO-RTO: 0 /100 WBCS
PLATELET # BLD AUTO: 442 THOUSANDS/UL (ref 149–390)
PMV BLD AUTO: 10.5 FL (ref 8.9–12.7)
POTASSIUM SERPL-SCNC: 3.9 MMOL/L (ref 3.5–5.3)
PROT SERPL-MCNC: 7.1 G/DL (ref 6.4–8.4)
RBC # BLD AUTO: 4.75 MILLION/UL (ref 3.81–5.12)
SL CV LV EF: 65
SODIUM SERPL-SCNC: 138 MMOL/L (ref 135–147)
WBC # BLD AUTO: 10.77 THOUSAND/UL (ref 4.31–10.16)

## 2024-02-20 PROCEDURE — 99233 SBSQ HOSP IP/OBS HIGH 50: CPT | Performed by: SURGERY

## 2024-02-20 PROCEDURE — 82948 REAGENT STRIP/BLOOD GLUCOSE: CPT

## 2024-02-20 PROCEDURE — 86900 BLOOD TYPING SEROLOGIC ABO: CPT | Performed by: INTERNAL MEDICINE

## 2024-02-20 PROCEDURE — 99232 SBSQ HOSP IP/OBS MODERATE 35: CPT

## 2024-02-20 PROCEDURE — 85025 COMPLETE CBC W/AUTO DIFF WBC: CPT | Performed by: STUDENT IN AN ORGANIZED HEALTH CARE EDUCATION/TRAINING PROGRAM

## 2024-02-20 PROCEDURE — 93306 TTE W/DOPPLER COMPLETE: CPT

## 2024-02-20 PROCEDURE — NC001 PR NO CHARGE: Performed by: PHYSICIAN ASSISTANT

## 2024-02-20 PROCEDURE — 93306 TTE W/DOPPLER COMPLETE: CPT | Performed by: INTERNAL MEDICINE

## 2024-02-20 PROCEDURE — 93926 LOWER EXTREMITY STUDY: CPT | Performed by: SURGERY

## 2024-02-20 PROCEDURE — 80053 COMPREHEN METABOLIC PANEL: CPT | Performed by: STUDENT IN AN ORGANIZED HEALTH CARE EDUCATION/TRAINING PROGRAM

## 2024-02-20 PROCEDURE — 86901 BLOOD TYPING SEROLOGIC RH(D): CPT | Performed by: INTERNAL MEDICINE

## 2024-02-20 PROCEDURE — 83735 ASSAY OF MAGNESIUM: CPT | Performed by: STUDENT IN AN ORGANIZED HEALTH CARE EDUCATION/TRAINING PROGRAM

## 2024-02-20 PROCEDURE — 93922 UPR/L XTREMITY ART 2 LEVELS: CPT | Performed by: SURGERY

## 2024-02-20 PROCEDURE — 99232 SBSQ HOSP IP/OBS MODERATE 35: CPT | Performed by: PHYSICIAN ASSISTANT

## 2024-02-20 RX ORDER — INSULIN LISPRO 100 [IU]/ML
5 INJECTION, SOLUTION INTRAVENOUS; SUBCUTANEOUS
Status: DISCONTINUED | OUTPATIENT
Start: 2024-02-20 | End: 2024-02-22

## 2024-02-20 RX ORDER — INSULIN LISPRO 100 [IU]/ML
2-12 INJECTION, SOLUTION INTRAVENOUS; SUBCUTANEOUS
Status: DISCONTINUED | OUTPATIENT
Start: 2024-02-20 | End: 2024-02-22

## 2024-02-20 RX ORDER — AMLODIPINE BESYLATE 5 MG/1
5 TABLET ORAL DAILY
Status: DISCONTINUED | OUTPATIENT
Start: 2024-02-21 | End: 2024-02-26 | Stop reason: HOSPADM

## 2024-02-20 RX ORDER — ATORVASTATIN CALCIUM 40 MG/1
40 TABLET, FILM COATED ORAL
Status: DISCONTINUED | OUTPATIENT
Start: 2024-02-20 | End: 2024-02-26 | Stop reason: HOSPADM

## 2024-02-20 RX ORDER — INSULIN GLARGINE 100 [IU]/ML
18 INJECTION, SOLUTION SUBCUTANEOUS EVERY MORNING
Status: DISCONTINUED | OUTPATIENT
Start: 2024-02-21 | End: 2024-02-21

## 2024-02-20 RX ORDER — INSULIN GLARGINE 100 [IU]/ML
12 INJECTION, SOLUTION SUBCUTANEOUS EVERY MORNING
Status: DISCONTINUED | OUTPATIENT
Start: 2024-02-20 | End: 2024-02-20

## 2024-02-20 RX ORDER — ACETAMINOPHEN 325 MG/1
975 TABLET ORAL EVERY 8 HOURS PRN
Status: DISCONTINUED | OUTPATIENT
Start: 2024-02-20 | End: 2024-02-26 | Stop reason: HOSPADM

## 2024-02-20 RX ORDER — HYDROMORPHONE HCL/PF 1 MG/ML
0.5 SYRINGE (ML) INJECTION EVERY 6 HOURS PRN
Status: DISCONTINUED | OUTPATIENT
Start: 2024-02-20 | End: 2024-02-24

## 2024-02-20 RX ADMIN — HYDROMORPHONE HYDROCHLORIDE 0.5 MG: 1 INJECTION, SOLUTION INTRAMUSCULAR; INTRAVENOUS; SUBCUTANEOUS at 03:54

## 2024-02-20 RX ADMIN — INSULIN GLARGINE 12 UNITS: 100 INJECTION, SOLUTION SUBCUTANEOUS at 08:49

## 2024-02-20 RX ADMIN — HYDROMORPHONE HYDROCHLORIDE 0.5 MG: 1 INJECTION, SOLUTION INTRAMUSCULAR; INTRAVENOUS; SUBCUTANEOUS at 17:40

## 2024-02-20 RX ADMIN — ENOXAPARIN SODIUM 40 MG: 40 INJECTION SUBCUTANEOUS at 21:29

## 2024-02-20 RX ADMIN — INSULIN LISPRO 6 UNITS: 100 INJECTION, SOLUTION INTRAVENOUS; SUBCUTANEOUS at 12:21

## 2024-02-20 RX ADMIN — ATORVASTATIN CALCIUM 40 MG: 40 TABLET, FILM COATED ORAL at 17:04

## 2024-02-20 RX ADMIN — CLOPIDOGREL BISULFATE 75 MG: 75 TABLET ORAL at 08:49

## 2024-02-20 RX ADMIN — OXYCODONE HYDROCHLORIDE 5 MG: 5 TABLET ORAL at 15:15

## 2024-02-20 RX ADMIN — OXYCODONE HYDROCHLORIDE 5 MG: 5 TABLET ORAL at 21:29

## 2024-02-20 RX ADMIN — INSULIN LISPRO 4 UNITS: 100 INJECTION, SOLUTION INTRAVENOUS; SUBCUTANEOUS at 08:49

## 2024-02-20 RX ADMIN — ASPIRIN 81 MG CHEWABLE TABLET 81 MG: 81 TABLET CHEWABLE at 08:49

## 2024-02-20 RX ADMIN — OXYCODONE HYDROCHLORIDE 5 MG: 5 TABLET ORAL at 09:55

## 2024-02-20 RX ADMIN — INSULIN LISPRO 5 UNITS: 100 INJECTION, SOLUTION INTRAVENOUS; SUBCUTANEOUS at 17:36

## 2024-02-20 RX ADMIN — ENOXAPARIN SODIUM 40 MG: 40 INJECTION SUBCUTANEOUS at 08:48

## 2024-02-20 RX ADMIN — INSULIN LISPRO 5 UNITS: 100 INJECTION, SOLUTION INTRAVENOUS; SUBCUTANEOUS at 12:21

## 2024-02-20 NOTE — ASSESSMENT & PLAN NOTE
Noting prominent left ovary Nonemergent pelvic ultrasound can be obtained if clinically warranted   Incidental note completed  I discussed this with patient and need for outpt follow up

## 2024-02-20 NOTE — UTILIZATION REVIEW
Initial Clinical Review    Admission: Date/Time/Statement:   Admission Orders (From admission, onward)       Ordered        02/19/24 1259  INPATIENT ADMISSION  Once                          Orders Placed This Encounter   Procedures    INPATIENT ADMISSION     Standing Status:   Standing     Number of Occurrences:   1     Order Specific Question:   Level of Care     Answer:   Med Surg [16]     Order Specific Question:   Estimated length of stay     Answer:   More than 2 Midnights     Order Specific Question:   Certification     Answer:   I certify that inpatient services are medically necessary for this patient for a duration of greater than two midnights. See H&P and MD Progress Notes for additional information about the patient's course of treatment.     ED Arrival Information       Expected   -    Arrival   2/19/2024 09:27    Acuity   Urgent              Means of arrival   Walk-In    Escorted by   Self    Service   Hospitalist    Admission type   Emergency              Arrival complaint   Toe Pain             Chief Complaint   Patient presents with    Toe Pain     Pt reports pain to right big toe for past 2 weeks. Pt reports toe started to swell and became discolored a couple days ago. Big toe is purple around the top, with a strip of white discoloration underneath. Pt reports toe itches.       Initial Presentation: 59 y.o. female presents to the ED from home with c/o pain in R great toe x 2 wks w/ swelling, discoloration, itching.  PMH: tobacco use, obesity.  In the ED pt is tachycardic, HTN.  She was seen by Podiatry in the ED.  Labs - elevated WBC, glucose 223.  Imaging - Large vol plaque distal infrarenal aorta with aortic luminal narrowing;  Right SFA disease; poor eval of tibial runoff d/t venous contamination, tibials intact, chronic splenic artery occlusion, decreased perfusion.  Treated with Plavix, ASA.  On exam no deficits.  She is admitted to INPATIENT status with PAD - Podiatry consult, Vascular  surgery consult, DPAT, cardio consult, tele.  Elevated glucose - A1C elevated,  lipid profile.      2/19 Podiatry Consult - Cold painful right great toe, Possible CLTI - no cap refill, no pulses R great toe, delayed capillary refill to the forefoot, dusky changes to the 1st, 5th digits and ball of the foot. Vascular consult, stat arterial studies, imaging negative, warm compresses, WBAT.     2/19 Cardio Consult - Acute R toe ischemia, PAD with diff arterial occlusive disease throughout the femoral-popliteal segment of the right leg with occlusions versus high-grade stenosis in the anterior tibial posterior tibial and peroneal arteries. Poorly compressible vessels. Obesity, Xanthelasmas bilateral eyelids - vascular US - severe arterial disease RLE, NSR, check lipid panel, echo, tele, c/f cardioembolic thrombus, US abd aorta, DAPT, smoking cessation.     2/19 Vascular Consult - R foot discoloration and pain without motor/sensory deficits. Intact pedal signals. Likely secondary to atheroemboli from distal aorta or SFA. Appreciate cardiology input to rule out cardioembolic source. Recommend dual antiplatelet therapy and statin.     Date: 2/20   Day 2:   Continue tele today til cardioembolic thrombus r/o.  Echo pending.  Needs OP pelvis US for prominent L ovary.  IR consulted for RLE angiogram w/ possible intervention; timing TBD pending IR schedule - Continue ASA, plavix and lipitor for medical management of PAD.  New diagnosis NIDDM.  Can't do needles.  On exam dusky blue discoloration of great toe on right, toe itself cold to touch, rest of foot warm, dorsal foot with erythema noted.  Continue DAPT, start statin.  Add Amlodipine, hold diuretics, ACE/ARB d/t poss IV Contrast.      2/20 IR Consult - PAD and R great toe atheroembolus - IR RLE Agram, pending schedule, NPO p MN before, update INR.      ED Triage Vitals   Temperature Pulse Respirations Blood Pressure SpO2   02/19/24 0944 02/19/24 0942 02/19/24 0942 02/19/24  0942 02/19/24 0942   97.6 °F (36.4 °C) (!) 121 18 (!) 208/95 97 %      Temp Source Heart Rate Source Patient Position - Orthostatic VS BP Location FiO2 (%)   02/19/24 0944 02/19/24 0942 02/19/24 0942 02/19/24 0942 --   Oral Monitor Sitting Right arm       Pain Score       02/19/24 1300       6          Wt Readings from Last 1 Encounters:   02/20/24 117 kg (257 lb 15 oz)     Additional Vital Signs:   Date/Time Temp Pulse Resp BP MAP (mmHg) SpO2 O2 Device Patient Position - Orthostatic VS   02/20/24 11:07:49 -- 92 23 Abnormal  185/91 Abnormal  122 94 % -- --   02/20/24 09:38:59 -- 93 -- 168/80 109 95 % -- --   02/20/24 0815 -- -- -- -- -- -- None (Room air) --   02/20/24 07:33:06 -- 81 20 148/70 96 93 % -- --   02/20/24 03:59:46 -- 78 -- 139/69 92 92 % -- --   02/20/24 00:18:17 -- 79 -- 157/68 98 90 % -- --   02/19/24 1929 98.1 °F (36.7 °C) 85 18 144/71 95 92 % None (Room air) Lying   02/19/24 1551 -- -- -- -- -- 96 % None (Room air) --   02/19/24 15:12:05 98.8 °F (37.1 °C) 94 17 134/61 85 96 % -- --   02/19/24 1300 -- 91 16 147/67 97 94 % None (Room air) Sitting   02/19/24 1200 -- 102 18 157/71 102 98 % None (Room air) Sitting   02/19/24 1008 -- 105 18 165/80 -- 98 % None (Room air) Lying       Pertinent Labs/Diagnostic Test Results:     2/19 ECG - Normal sinus rhythm  Prolonged QT  Abnormal ECG    2/20 Echo - P     CTA ABDOMEN W RUN OFF W WO CONTRAST   Final Result by Adam Perez DO (02/19 1834)      Large volume of noncalcific atherosclerotic plaque involving the distal infrarenal aorta with aortic luminal narrowing.      Right SFA disease related to noncalcific plaque. The tibial runoff is poorly evaluated given venous contamination; however, the tibial vessels do appear intact with faint filling of the plantar artery. The dorsalis pedis is not well visualized.      No significant left lower extremity arterial disease.      Splenic artery occlusion, likely chronic. No evidence of splenic infarct.  There is decreased arterial mottling suggestive of decreased perfusion.      Prominent left ovary. Nonemergent pelvic ultrasound can be obtained if clinically warranted.               Workstation performed: [id number]         XR toe great min 2 views RIGHT   Final Result by Joshua Driver MD (02/20 0905)      Mild soft tissue swelling overlying the first toe with no acute osseous abnormality.               Resident: CHAVA LABOY I, the attending radiologist, have reviewed the images and agree with the final report above.      Workstation performed: [id number]         VAS ARTERIAL DUPLEX-LOWER LIMB UNILATERAL    (Results Pending)            Results from last 7 days   Lab Units 02/20/24  0449 02/19/24  1006   WBC Thousand/uL 10.77* 12.23*   HEMOGLOBIN g/dL 12.6 13.3   HEMATOCRIT % 40.2 42.6   PLATELETS Thousands/uL 442* 472*   NEUTROS ABS Thousands/µL 6.41 8.48*         Results from last 7 days   Lab Units 02/20/24  0449 02/19/24  1155   SODIUM mmol/L 138 138   POTASSIUM mmol/L 3.9 3.9   CHLORIDE mmol/L 105 102   CO2 mmol/L 23 27   ANION GAP mmol/L 10 9   BUN mg/dL 10 10   CREATININE mg/dL 0.53* 0.62   EGFR ml/min/1.73sq m 103 98   CALCIUM mg/dL 9.0 9.4   MAGNESIUM mg/dL 2.0  --      Results from last 7 days   Lab Units 02/20/24  0449 02/19/24  1155   AST U/L 8* 9*   ALT U/L 9 11   ALK PHOS U/L 80 102   TOTAL PROTEIN g/dL 7.1 8.1   ALBUMIN g/dL 3.7 4.1   TOTAL BILIRUBIN mg/dL 0.29 0.25     Results from last 7 days   Lab Units 02/20/24  1106 02/20/24  0842   POC GLUCOSE mg/dl 259* 200*     Results from last 7 days   Lab Units 02/20/24  0449 02/19/24  1155   GLUCOSE RANDOM mg/dL 193* 223*         Results from last 7 days   Lab Units 02/19/24  1006   HEMOGLOBIN A1C % 12.3*   EAG mg/dl 306     ED Treatment:   Medication Administration from 02/19/2024 0927 to 02/19/2024 1507         Date/Time Order Dose Route Action     02/19/2024 1302 EST clopidogrel (PLAVIX) tablet 75 mg 75 mg Oral Given     02/19/2024 1304  EST aspirin chewable tablet 81 mg 81 mg Oral Given       Admitting Diagnosis: Toe pain [M79.676]  Smoker [F17.200]  PAD (peripheral artery disease) (HCC) [I73.9]  Great toe pain, right [M79.674]  Age/Sex: 59 y.o. female  Admission Orders:  Scheduled Medications:  aspirin, 81 mg, Oral, Daily  atorvastatin, 40 mg, Oral, Daily With Dinner  clopidogrel, 75 mg, Oral, Daily  enoxaparin, 40 mg, Subcutaneous, Q12H OMID  [START ON 2/21/2024] insulin glargine, 18 Units, Subcutaneous, QAM  insulin lispro, 2-12 Units, Subcutaneous, TID AC  insulin lispro, 2-12 Units, Subcutaneous, HS  insulin lispro, 5 Units, Subcutaneous, TID With Meals  nicotine, 1 patch, Transdermal, Daily      Continuous IV Infusions:     PRN Meds:  acetaminophen, 975 mg, Oral, Q8H PRN - x 1 2/19  HYDROmorphone, 0.5 mg, Intravenous, Q6H PRN - x 1 2/20  naloxone, 0.04 mg, Intravenous, Q1MIN PRN  ondansetron, 4 mg, Intravenous, Q6H PRN  oxyCODONE, 2.5 mg, Oral, Q6H PRN   Or  oxyCODONE, 5 mg, Oral, Q6H PRN - x 1 2/19, 2/30  polyethylene glycol, 17 g, Oral, Daily PRN    Tele  POC GLUCOSE AC/HS WITH SSI COVERAGE   IR angiogram RLE  ADA diet   Echo  IP CONSULT TO PODIATRY  IP CONSULT TO VASCULAR SURGERY  IP CONSULT TO CARDIOLOGY  IP CONSULT TO NUTRITION SERVICES  INPATIENT CONSULT TO IR    Network Utilization Review Department  ATTENTION: Please call with any questions or concerns to [phone] and carefully listen to the prompts so that you are directed to the right person. All voicemails are confidential.   For Discharge needs, contact Care Management DC Support Team at [phone] opt. [phone]  Send all requests for admission clinical reviews, approved or denied determinations and any other requests to dedicated fax number below belonging to the campus where the patient is receiving treatment. List of dedicated fax numbers for the Facilities:  FACILITY NAME UR FAX NUMBER   ADMISSION DENIALS (Administrative/Medical Necessity) [phone]   DISCHARGE SUPPORT TEAM  (NETWORK) [phone]   PARENT CHILD HEALTH (Maternity/NICU/Pediatrics) [phone]   [facility] [phone]   [facility] [phone]   [facility] [phone]   [facility] [phone]   [facility] [phone]   [facility] [phone]   [facility] [phone]   [facility] [phone]   [facility] [phone]   [facility] [phone]   [facility] [phone]   [facility] [phone]

## 2024-02-20 NOTE — PLAN OF CARE
Problem: PAIN - ADULT  Goal: Verbalizes/displays adequate comfort level or baseline comfort level  Description: Interventions:  - Encourage patient to monitor pain and request assistance  - Assess pain using appropriate pain scale  - Administer analgesics based on type and severity of pain and evaluate response  - Implement non-pharmacological measures as appropriate and evaluate response  - Consider cultural and social influences on pain and pain management  - Notify physician/advanced practitioner if interventions unsuccessful or patient reports new pain  Outcome: Progressing     Problem: SAFETY ADULT  Goal: Patient will remain free of falls  Description: INTERVENTIONS:  - Educate patient/family on patient safety including physical limitations  - Instruct patient to call for assistance with activity   - Consult OT/PT to assist with strengthening/mobility   - Keep Call bell within reach  - Keep bed low and locked with side rails adjusted as appropriate  - Keep care items and personal belongings within reach  - Initiate and maintain comfort rounds  - Make Fall Risk Sign visible to staff  - Obtain necessary fall risk management equipment  - Apply yellow socks and bracelet for high fall risk patients  - Consider moving patient to room near nurses station  Outcome: Progressing  Goal: Maintain or return to baseline ADL function  Description: INTERVENTIONS:  -  Assess patient's ability to carry out ADLs; assess patient's baseline for ADL function and identify physical deficits which impact ability to perform ADLs (bathing, care of mouth/teeth, toileting, grooming, dressing, etc.)  - Assess/evaluate cause of self-care deficits   - Assess range of motion  - Assess patient's mobility; develop plan if impaired  - Assess patient's need for assistive devices and provide as appropriate  - Encourage maximum independence but intervene and supervise when necessary  - Involve family in performance of ADLs  - Assess for home care  needs following discharge   - Consider OT consult to assist with ADL evaluation and planning for discharge  - Provide patient education as appropriate  Outcome: Progressing  Goal: Maintains/Returns to pre admission functional level  Description: INTERVENTIONS:  - Perform AM-PAC 6 Click Basic Mobility/ Daily Activity assessment daily.  - Set and communicate daily mobility goal to care team and patient/family/caregiver.   - Collaborate with rehabilitation services on mobility goals if consulted  - Perform Range of Motion 3 times a day.  - Reposition patient every 2 hours.  - Dangle patient 3 times a day  - Stand patient 3 times a day  - Ambulate patient 3 times a day  - Out of bed to chair 3 times a day   - Out of bed for meals 3 times a day  - Out of bed for toileting  - Record patient progress and toleration of activity level   Outcome: Progressing     Problem: DISCHARGE PLANNING  Goal: Discharge to home or other facility with appropriate resources  Description: INTERVENTIONS:  - Identify barriers to discharge w/patient and caregiver  - Arrange for needed discharge resources and transportation as appropriate  - Identify discharge learning needs (meds, wound care, etc.)  - Arrange for interpretive services to assist at discharge as needed  - Refer to Case Management Department for coordinating discharge planning if the patient needs post-hospital services based on physician/advanced practitioner order or complex needs related to functional status, cognitive ability, or social support system  Outcome: Progressing     Problem: Knowledge Deficit  Goal: Patient/family/caregiver demonstrates understanding of disease process, treatment plan, medications, and discharge instructions  Description: Complete learning assessment and assess knowledge base.  Interventions:  - Provide teaching at level of understanding  - Provide teaching via preferred learning methods  Outcome: Progressing     Problem: CARDIOVASCULAR -  ADULT  Goal: Absence of cardiac dysrhythmias or at baseline rhythm  Description: INTERVENTIONS:  - Continuous cardiac monitoring, vital signs, obtain 12 lead EKG if ordered  - Administer antiarrhythmic and heart rate control medications as ordered  - Monitor electrolytes and administer replacement therapy as ordered  Outcome: Progressing     Problem: SKIN/TISSUE INTEGRITY - ADULT  Goal: Skin Integrity remains intact(Skin Breakdown Prevention)  Description: Assess:  -Perform Marty assessment  -Clean and moisturize skin  -Inspect skin when repositioning, toileting, and assisting with ADLS  -Assess under medical devices  -Assess extremities for adequate circulation and sensation     Bed Management:  -Have minimal linens on bed & keep smooth, unwrinkled  -Change linens as needed when moist or perspiring  -Avoid sitting or lying in one position for more than 2 hours while in bed  -Keep HOB at 30 degrees     Toileting:  -Offer bedside commode  -Assess for incontinence  -Use incontinent care products after each incontinent episode    Activity:  -Mobilize patient 3 times a day  -Encourage activity and walks on unit  -Encourage or provide ROM exercises   -Turn and reposition patient every 2 Hours  -Use appropriate equipment to lift or move patient in bed  -Instruct/ Assist with weight shifting when out of bed in chair  -Consider limitation of chair time 2 hour intervals    Skin Care:  -Avoid use of baby powder, tape, friction and shearing, hot water or constrictive clothing  -Relieve pressure over bony prominences  -Do not massage red bony areas    Next Steps:  -Teach patient strategies to minimize risks   -Consider consults to  interdisciplinary teams  Outcome: Progressing  Goal: Incision(s), wounds(s) or drain site(s) healing without S/S of infection  Description: INTERVENTIONS  - Assess and document dressing, incision, wound bed, drain sites and surrounding tissue  - Provide patient and family education  - Perform skin  care/dressing changes  Outcome: Progressing  Goal: Pressure injury heals and does not worsen  Description: Interventions:  - Implement low air loss mattress or specialty surface (Criteria met)  - Apply silicone foam dressing  - Instruct/assist with weight shifting every 60 minutes when in chair   - Limit chair time to 2 hour intervals  - Use special pressure reducing interventions when in chair   - Apply fecal or urinary incontinence containment device   - Perform passive or active ROM  - Turn and reposition patient & offload bony prominences every 2 hours   - Utilize friction reducing device or surface for transfers   - Consider consults to  interdisciplinary teams  - Use incontinent care products after each incontinent episode  - Consider nutrition services referral as needed  Outcome: Progressing     Problem: MUSCULOSKELETAL - ADULT  Goal: Maintain or return mobility to safest level of function  Description: INTERVENTIONS:  - Assess patient's ability to carry out ADLs; assess patient's baseline for ADL function and identify physical deficits which impact ability to perform ADLs (bathing, care of mouth/teeth, toileting, grooming, dressing, etc.)  - Assess/evaluate cause of self-care deficits   - Assess range of motion  - Assess patient's mobility  - Assess patient's need for assistive devices and provide as appropriate  - Encourage maximum independence but intervene and supervise when necessary  - Involve family in performance of ADLs  - Assess for home care needs following discharge   - Consider OT consult to assist with ADL evaluation and planning for discharge  - Provide patient education as appropriate  Outcome: Progressing  Goal: Maintain proper alignment of affected body part  Description: INTERVENTIONS:  - Support, maintain and protect limb and body alignment  - Provide patient/ family with appropriate education  Outcome: Progressing     Problem: METABOLIC, FLUID AND ELECTROLYTES - ADULT  Goal: Electrolytes  maintained within normal limits  Description: INTERVENTIONS:  - Monitor labs and assess patient for signs and symptoms of electrolyte imbalances  - Administer electrolyte replacement as ordered  - Monitor response to electrolyte replacements, including repeat lab results as appropriate  - Instruct patient on fluid and nutrition as appropriate  Outcome: Progressing  Goal: Fluid balance maintained  Description: INTERVENTIONS:  - Monitor labs   - Monitor I/O and WT  - Instruct patient on fluid and nutrition as appropriate  - Assess for signs & symptoms of volume excess or deficit  Outcome: Progressing  Goal: Glucose maintained within target range  Description: INTERVENTIONS:  - Monitor Blood Glucose as ordered  - Assess for signs and symptoms of hyperglycemia and hypoglycemia  - Administer ordered medications to maintain glucose within target range  - Assess nutritional intake and initiate nutrition service referral as needed  Outcome: Progressing

## 2024-02-20 NOTE — PHYSICAL THERAPY NOTE
Physical Therapy Screen    Patient Name: Georgie King    Today's Date: 2/20/2024 02/20/24 0859   PT Last Visit   PT Visit Date 02/20/24   Note Type   Note type Screen   Additional Comments PT consult received. Chart reviewed. Pt WBAT to RLE per podiatry. Upon arrival pt seated at EOB. Pt states she is independent with ADLs, IADLs, and mobility without AD at baseline. Has been ambulating independently in room to and from bathroom w/o AD. Pt has no acute PT needs at this time. Will complete orders. Re-consult if change in functional status.     Jaye Petersen, PT

## 2024-02-20 NOTE — INCIDENTAL FINDINGS
The following findings require follow up:  Radiographic finding   Finding:   Prominent left ovary. Nonemergent pelvic ultrasound can be obtained if clinically warranted.   Nonspecific bilateral inguinal adenopathy, slightly more pronounced on the right. Largest measures 1.2 cm short axis on the right.    Follow up required: yes   Follow up should be done within 1 week(s)    Please notify the following clinician to assist with the follow up:   PCP

## 2024-02-20 NOTE — ASSESSMENT & PLAN NOTE
Has not seen a doctor for a while. Presented with hyperglycemia   A1c 12.3% , new diagnosis   Start modest basal/bolus regimen   lantus 12 units qAm and humalog 3 units tid with meals and will adjust based on response   Changed to ADA diet   SSI, accuchecks   Nutrition consulted

## 2024-02-20 NOTE — ASSESSMENT & PLAN NOTE
59 year old female presented to our ED due to two weeks of right great toe pain and discoloration.   Appreciate podiatry, vascular and cardiology recommendations  No surgical intervention planned per podiatry or vascular at this time   Arterial duplex - right diffuse arterial occlusive disease throughout the femoral-popliteal segments without focal stenosis, tibioperoneal disease with occlusions versus high-grade stenosis in the anterior tibial posterior tibial and peroneal arteries  CTA large-volume noncalcific atherosclerotic plaque involving the distal infrarenal aorta with aortic luminal narrowing, right SFA disease, splenic artery occlusion likely chronic no evidence of splenic infarct  DAPT with aspirin and plavix initiated   ASCVD is at 16.8%, recommendation to start statin therapy   Echocardiogram pending  Continue telemetry monitoring while ruling out cardioembolic thrombus

## 2024-02-20 NOTE — PROGRESS NOTES
Progress Note - Cardiology   Georgie King 59 y.o. female MRN: [id number]  Unit/Bed#: [place] [address] -01 Encounter: [id number]      Assessment/Recommendations/Discussion:   Acute right toe ischemia  Peripheral arterial disease with diffuse arterial occlusive disease throughout the femoral-popliteal segment of the right leg with occlusions versus high-grade stenosis in the anterior tibial posterior tibial and peroneal arteries.  Poorly compressible vessels.  Obesity  Xanthelasmas bilateral eyelids  Active smoker about half pack a day  Type 2 diabetes  Hypertension    Results from last 7 days   Lab Units 02/20/24  1023   SL CV LV EF  65        PLAN  I personally reviewed the echo images today, she has normal ejection fraction, no evidence of intracardiac thrombus  I reviewed the CT that she had with CTA abdomen with runoff.  She has significant atherosclerotic plaque in the distal ascending aorta and right superficial femoral artery with severe PAD in the right leg.  Likely source of embolism is atheroembolic from this as opposed to her heart.  Telemetry negative for arrhythmias.    Will continue with telemetry to rule out paroxysmal arrhythmias but suspect the peripheral embolism is a atheroembolism from her aorta or SFA disease  Vascular following  Continue with dual antiplatelet therapy    She does not usually follow with doctors.  Unfortunately her workup so far has uncovered some rather significant underlying issues...  She is now a hypertensive, dyslipidemic, obese, type II diabetic, smoker with severe peripheral vascular disease-with acute limb ischemia.    Hemoglobin A1c 12.3.    Agree with statin initiation with Lipitor given the extent of her PAD and dyslipidemia with elevated triglycerides, low HDL and LDL greater than 70 given her level disease in a diabetic.  Plan for angiogram of right lower extremity.  Will add amlodipine 5 mg for blood pressure.  Holding off on diuretics and ACE/ARB given likely need  "for IV contrast with angiogram and possible intervention.  Otherwise stable from cardiac standpoint - will follow peripherally - please reach out if questions    Subjective:   HPI  No issues from heart standpoint, no palpitations or shortness of breath.  Telemetry without arrhythmia      Review of Systems: As noted in HPI. Rest of ROS is negative.    Vitals:   BP (!) 185/91   Pulse 92   Temp 98.1 °F (36.7 °C) (Oral)   Resp (!) 23   Ht 5' 1\" (1.549 m)   Wt 117 kg (257 lb 15 oz)   SpO2 94%   BMI 48.74 kg/m²   I/O       None          Weight (last 2 days)       Date/Time Weight    02/20/24 09:38:59 117 (257.94)    02/19/24 0942 117 (257.94)            Physical Exam   Constitutional: awake, alert and oriented, in no acute distress, no obvious deformities, obese female  Head: Normocephalic, without obvious abnormality, atraumatic  Eyes: conjunctivae clear and moist. Sclera anicteric. No xanthelasmas. Pupils equal bilaterally. Extraocular motions are full.  Ear nose mouth and throat: ears are symmetrical bilaterally, hearing appears to be equal bilaterally, no nasal discharge or epistaxis, oropharynx is clear with moist mucous membranes  Neck: Trachea is midline, neck is supple, no thyromegaly or significant lymphadenopathy, there is full range of motion.  Lungs: clear to auscultation bilaterally, no wheezes, no rales, no rhonchi, no accessory muscle use, breathing is nonlabored  Heart: Regular rhythm with a Normal heart rate, S1, S2 normal, No Murmur, no click, rub or gallop, No lower extremity edema  Abdomen: Obese, soft, non-tender; bowel sounds normal; no masses, no organomegaly  Psychiatric: Patient is oriented to time, place, person, mood/affect is negative for depression, anxiety, agitation, appears to have appropriate insight  Skin: Skin is warm, dry, intact. No obvious rashes or lesions on exposed extremities. Nail beds are pink with no cyanosis or clubbing.  Right great toe with dusky color " change      TELEMETRY: sinus  Lab Results:  Results from last 7 days   Lab Units 02/20/24  0449   WBC Thousand/uL 10.77*   HEMOGLOBIN g/dL 12.6   HEMATOCRIT % 40.2   PLATELETS Thousands/uL 442*     Results from last 7 days   Lab Units 02/20/24  0449   POTASSIUM mmol/L 3.9   CHLORIDE mmol/L 105   CO2 mmol/L 23   BUN mg/dL 10   CREATININE mg/dL 0.53*   CALCIUM mg/dL 9.0   ALK PHOS U/L 80   ALT U/L 9   AST U/L 8*     Results from last 7 days   Lab Units 02/20/24  0449   POTASSIUM mmol/L 3.9   CHLORIDE mmol/L 105   CO2 mmol/L 23   BUN mg/dL 10   CREATININE mg/dL 0.53*   CALCIUM mg/dL 9.0           Medications:    Current Facility-Administered Medications:     acetaminophen (TYLENOL) tablet 975 mg, 975 mg, Oral, Q8H PRN, Hector Stevens DO    aspirin chewable tablet 81 mg, 81 mg, Oral, Daily, Maira Reyes PA-C, 81 mg at 02/20/24 0849    atorvastatin (LIPITOR) tablet 40 mg, 40 mg, Oral, Daily With Dinner, Kelly Martin PA-C    clopidogrel (PLAVIX) tablet 75 mg, 75 mg, Oral, Daily, Maira Reyes PA-C, 75 mg at 02/20/24 0849    enoxaparin (LOVENOX) subcutaneous injection 40 mg, 40 mg, Subcutaneous, Q12H OMID, Pastor Sandra MD, 40 mg at 02/20/24 0848    HYDROmorphone (DILAUDID) injection 0.5 mg, 0.5 mg, Intravenous, Q6H PRN, Keanu Olguin PA-C, 0.5 mg at 02/20/24 0354    [START ON 2/21/2024] insulin glargine (LANTUS) subcutaneous injection 18 Units 0.18 mL, 18 Units, Subcutaneous, QAKelly CLARKE PA-C    insulin lispro (HumaLOG) 100 units/mL subcutaneous injection 2-12 Units, 2-12 Units, Subcutaneous, TID AC, 6 Units at 02/20/24 1221 **AND** Fingerstick Glucose (POCT), , , TID ACKelly PA-C    insulin lispro (HumaLOG) 100 units/mL subcutaneous injection 2-12 Units, 2-12 Units, Subcutaneous, HS, Kelly Martin PA-C    insulin lispro (HumaLOG) 100 units/mL subcutaneous injection 5 Units, 5 Units, Subcutaneous, TID With Meals, Kelly Martin PA-C, 5 Units at 02/20/24 1221    naloxone  "(NARCAN) 0.04 mg/mL syringe 0.04 mg, 0.04 mg, Intravenous, Q1MIN PRN, Keanu Olguin PA-C    nicotine (NICODERM CQ) 21 mg/24 hr TD 24 hr patch 1 patch, 1 patch, Transdermal, Daily, Pastor Sandra MD    ondansetron (ZOFRAN) injection 4 mg, 4 mg, Intravenous, Q6H PRN, Keanu Olguin PA-C    oxyCODONE (ROXICODONE) split tablet 2.5 mg, 2.5 mg, Oral, Q6H PRN **OR** oxyCODONE (ROXICODONE) IR tablet 5 mg, 5 mg, Oral, Q6H PRN, Keanu Olguin PA-C, 5 mg at 02/20/24 1515    polyethylene glycol (MIRALAX) packet 17 g, 17 g, Oral, Daily PRN, Keanu Olguin PA-C    Portions of the record may have been created with voice recognition software. Occasional wrong word or \"sound a like\" substitutions may have occurred due to the inherent limitations of voice recognition software. Read the chart carefully and recognize, using context, where substitutions have occurred.    Linwood Foley DO, [facility], FASNC  2/20/2024 3:53 PM      "

## 2024-02-20 NOTE — CONSULTS
"e-Consult (IPC)  - Interventional Radiology  Georgie King 59 y.o. female MRN: [id number]  Unit/Bed#: [place] [address] -01 Encounter: [id number]          Interventional Radiology has been consulted to evaluate Georgie King    We were consulted by vascular concerning this patient with PAD and right great toe atheroembolus.    Inpatient Consult to IR  Consult performed by: Poornima Portillo PA-C  Consult ordered by: KARY Souza        02/20/24    Assessment/Recommendation:   Patient is a 58 y/o female with a hx of tobacco use who presented to the ED yesterday with 2-3 weeks of right great toe pain, and a few days of swelling/discoloration. Per vascular notes, no motor or sensory deficit and pain isolated to the toe. Patient with atheroemboli likely from distal aorta or SFA, though cardiology consulted to r/o cardioembolic source. Recent LEADs with right tibioperoneal disease with occlusions vs high grade stenoses in AT, PT, and peroneal arteries. CTA runoff with \"large volume of noncalcific atherosclerotic plaque involving the distal infrarenal aorta with aortic luminal narrowing.\" Also with SFA disease, but poor evaluation more distal due to venous contamination. Per vascular notes, concern for worsening skin, potential for non-healing wound. We were consulted to evaluate patient for possible angiogram.    -Plan for IR RLE angiogram this week, pending IR schedule  -Please make NPO at midnight prior. Orders will be placed once timing confirmed  -Patient needs updated INR, orders placed. Target </=1.8  -Appreciate vascular recommendations  -Remainder of care per primary team  -Please reach out to IR with any questions/concerns    5-10 minutes, >50% of the total time devoted to medical consultative verbal/EMR discussion between providers. Written report will be generated in the EMR.     Thank you for allowing Interventional Radiology to participate in the care of Georgie King. Please don't hesitate to call or " TigerText us with any questions.     Poornima Portillo PA-C

## 2024-02-20 NOTE — PROGRESS NOTES
[facility]  Progress Note  Name: Georgie King I  MRN: [id number]  Unit/Bed#: [place] [address] -01 I Date of Admission: 2/19/2024   Date of Service: 2/20/2024 I Hospital Day: 1    Assessment/Plan   * PAD (peripheral artery disease) ([facility])  Assessment & Plan  60 yo female smoker w/ no past medical history presented to [facility] on 2/19/24 w/ R toe pain. Pt reports R great toe pain started two weeks ago and has been slowly increasing until last night, when the pain became unbearable. A few days ago, the toe started to swell and become discolored. Pain is isolated to the R great toe w/ intact motor and sensation.    Vascular surgery consulted for R great toe pain. LEAD showed tibioperoneal disease w/ occlusions vs high grade stenoses in the R AT, PT and peroneal arteries, likely secondary to atheroemboli from distal aorta or SFA.      Diagnostics:  -2/19/24 R great toe xray: Mild soft tissue swelling overlying the first toe with no acute osseous abnormality.   -2/19/24 LEAD: Right: Evidence of diffuse arterial occlusive disease throughout the femoropopliteal segments without focal stenosis. Evidence of tibioperoneal disease with occlusions vs high grade stenoses in the anterior tibial, posterior tibial and peroneal arteries. R EVELIA: 1.39/-/- (poorly compressible vessels). Left: L EVELIA: 1.32/220/110.  -2/19/24 CTA abdomen w/ runoff: Large volume of noncalcific atherosclerotic plaque involving the distal infrarenal aorta with aortic luminal narrowing. R SFA disease related to noncalcific plaque. The tibial runoff is poorly evaluated given venous contamination; however, the tibial vessels do appear intact with faint filling of the plantar artery. The dorsalis pedis is not well visualized. No significant left lower extremity arterial disease. Splenic artery occlusion, likely chronic. No evidence of splenic infarct. There is decreased arterial mottling suggestive of decreased perfusion.    Plan:  -R great  "toe ischemia and pain without sensory/motor deficits  -LEAD showed tibioperoneal disease w/ occlusions vs high grade stenoses in the R AT, PT and peroneal arteries; R EVELIA: 1.39/-/-  -IR consulted for RLE angiogram w/ possible intervention; timing TBD pending IR schedule  -Continue ASA, plavix and lipitor for medical management of PAD  -Cardiology following to rule out cardioembolic source; input appreciated  -Smoking cessation  -Discussed w/ Dr. Hayes      Subjective:  Pt seen for exam while sitting on her bed; NAD. Pt reports pain in R great toe which worsens w/ elevation and improves w/ standing. Otherwise, she denies any further complaints at this time.    Vitals:  /80   Pulse 93   Temp 98.1 °F (36.7 °C) (Oral)   Resp 20   Ht 5' 1\" (1.549 m)   Wt 117 kg (257 lb 15 oz)   SpO2 95%   BMI 48.74 kg/m²     I/Os:  No intake/output data recorded.  No intake/output data recorded.    Lab Results and Cultures:   Lab Results   Component Value Date    WBC 10.77 (H) 02/20/2024    HGB 12.6 02/20/2024    HCT 40.2 02/20/2024    MCV 85 02/20/2024     (H) 02/20/2024     Lab Results   Component Value Date    GLUCOSE 111 03/16/2015    CALCIUM 9.0 02/20/2024     03/16/2015    K 3.9 02/20/2024    CO2 23 02/20/2024     02/20/2024    BUN 10 02/20/2024    CREATININE 0.53 (L) 02/20/2024       Medications:  Current Facility-Administered Medications   Medication Dose Route Frequency    acetaminophen (TYLENOL) tablet 975 mg  975 mg Oral Q8H PRN    aspirin chewable tablet 81 mg  81 mg Oral Daily    atorvastatin (LIPITOR) tablet 40 mg  40 mg Oral Daily With Dinner    clopidogrel (PLAVIX) tablet 75 mg  75 mg Oral Daily    enoxaparin (LOVENOX) subcutaneous injection 40 mg  40 mg Subcutaneous Q12H OMID    HYDROmorphone (DILAUDID) injection 0.5 mg  0.5 mg Intravenous Q6H PRN    insulin glargine (LANTUS) subcutaneous injection 12 Units 0.12 mL  12 Units Subcutaneous QAM    insulin lispro (HumaLOG) 100 units/mL " subcutaneous injection 2-12 Units  2-12 Units Subcutaneous TID AC    insulin lispro (HumaLOG) 100 units/mL subcutaneous injection 2-12 Units  2-12 Units Subcutaneous HS    insulin lispro (HumaLOG) 100 units/mL subcutaneous injection 5 Units  5 Units Subcutaneous TID With Meals    naloxone (NARCAN) 0.04 mg/mL syringe 0.04 mg  0.04 mg Intravenous Q1MIN PRN    nicotine (NICODERM CQ) 21 mg/24 hr TD 24 hr patch 1 patch  1 patch Transdermal Daily    ondansetron (ZOFRAN) injection 4 mg  4 mg Intravenous Q6H PRN    oxyCODONE (ROXICODONE) split tablet 2.5 mg  2.5 mg Oral Q6H PRN    Or    oxyCODONE (ROXICODONE) IR tablet 5 mg  5 mg Oral Q6H PRN    polyethylene glycol (MIRALAX) packet 17 g  17 g Oral Daily PRN       Imaging:  See above    Physical Exam:    General appearance: alert and oriented, in no acute distress  Skin: Skin color, texture, turgor normal. No rashes or lesions  Neurologic: Grossly normal  Head: Normocephalic, without obvious abnormality, atraumatic  Lungs: clear to auscultation bilaterally  Heart: regular rate and rhythm, S1, S2 normal, no murmur, click, rub or gallop  Abdomen:  obese, soft, non-tender  Extremities:  R foot cool, (+) motor/sensation; LLE warm, well-perfused    Wound/Incision:    R foot    Pulse exam:  AT: Right: doppler signal  DP: Right:  absent  Left: 1+  PT: Right: doppler signal Left: doppler signal      KARY Olivo  2/20/2024

## 2024-02-20 NOTE — PROGRESS NOTES
[facility]  Progress Note  Name: Georgie King I  MRN: [id number]  Unit/Bed#: [place] [address] -01 I Date of Admission: 2/19/2024   Date of Service: 2/20/2024 I Hospital Day: 1    Assessment/Plan   * PAD (peripheral artery disease) (HCC)  Assessment & Plan  59 year old female presented to our ED due to two weeks of right great toe pain and discoloration.   Appreciate podiatry, vascular and cardiology recommendations  No surgical intervention planned per podiatry or vascular at this time   Arterial duplex - right diffuse arterial occlusive disease throughout the femoral-popliteal segments without focal stenosis, tibioperoneal disease with occlusions versus high-grade stenosis in the anterior tibial posterior tibial and peroneal arteries  CTA large-volume noncalcific atherosclerotic plaque involving the distal infrarenal aorta with aortic luminal narrowing, right SFA disease, splenic artery occlusion likely chronic no evidence of splenic infarct  DAPT with aspirin and plavix initiated   ASCVD is at 16.8%, recommendation to start statin therapy   Echocardiogram pending  Continue telemetry monitoring while ruling out cardioembolic thrombus    Morbid obesity (HCC)  Assessment & Plan  BMI 48 noted   Nutritionist consulted       Abnormal CT scan  Assessment & Plan  Noting prominent left ovary Nonemergent pelvic ultrasound can be obtained if clinically warranted   Incidental note completed  I discussed this with patient and need for outpt follow up     Type 2 diabetes mellitus, without long-term current use of insulin (HCC)  Assessment & Plan  Has not seen a doctor for a while. Presented with hyperglycemia   A1c 12.3% , new diagnosis   Start modest basal/bolus regimen   lantus 12 units qAm and humalog 3 units tid with meals and will adjust based on response   Changed to ADA diet   SSI, accuchecks   Nutrition consulted       Tobacco abuse  Assessment & Plan   Nicotine patch   Smoking cessation  education              VTE Pharmacologic Prophylaxis:   Moderate Risk (Score 3-4) - Pharmacological DVT Prophylaxis Ordered: enoxaparin (Lovenox).    Mobility:   Basic Mobility Inpatient Raw Score: 24  JH-HLM Goal: 8: Walk 250 feet or more  JH-HLM Achieved: 7: Walk 25 feet or more  HLM Goal achieved. Continue to encourage appropriate mobility.    Patient Centered Rounds: I performed bedside rounds with nursing staff today.   Discussions with Specialists or Other Care Team Provider: will discuss with consultants     Education and Discussions with Family / Patient: Patient declined call to .     Total Time Spent on Date of Encounter in care of patient:  mins. This time was spent on one or more of the following: performing physical exam; counseling and coordination of care; obtaining or reviewing history; documenting in the medical record; reviewing/ordering tests, medications or procedures; communicating with other healthcare professionals and discussing with patient's family/caregivers.    Current Length of Stay: 1 day(s)  Current Patient Status: Inpatient   Certification Statement: The patient will continue to require additional inpatient hospital stay due to PAD, type 2 DM   Discharge Plan: Anticipate discharge in 24-48 hrs to home.    Code Status: Level 1 - Full Code    Subjective:   Patient walking around room making tea. Report rigth great toe gets white and purple when elevated and dusky blue when dependent. It is painful. No leg calf pain. No CP or SOB. No palpaitions. No fevers. Chills. Discussed all imaging results thus far as well as new meds added and new diagnosis of DM. She states she is very terrified of needles and will not do insulin at home. States her hubsand kokes about everything and would not be able to help either.      Objective:     Vitals:   Temp (24hrs), Av.2 °F (36.8 °C), Min:97.6 °F (36.4 °C), Max:98.8 °F (37.1 °C)    Temp:  [97.6 °F (36.4 °C)-98.8 °F (37.1 °C)] 98.1 °F  (36.7 °C)  HR:  [] 81  Resp:  [16-20] 20  BP: (134-208)/(61-95) 148/70  SpO2:  [90 %-98 %] 93 %  Body mass index is 48.74 kg/m².     Input and Output Summary (last 24 hours):   No intake or output data in the 24 hours ending 02/20/24 0916    Physical Exam:   Physical Exam  Vitals and nursing note reviewed.   Constitutional:       Appearance: She is obese.   Cardiovascular:      Rate and Rhythm: Normal rate and regular rhythm.   Pulmonary:      Effort: Pulmonary effort is normal.      Breath sounds: Normal breath sounds.   Abdominal:      General: Bowel sounds are normal.      Palpations: Abdomen is soft.   Musculoskeletal:      Comments: Walking around room    Skin:     Comments: Dusky blue discoloration of great toe on right, toe itself cold to touch, rest of foot warm, dorsal foot with erythema noted    Neurological:      Mental Status: She is alert. Mental status is at baseline.   Psychiatric:         Mood and Affect: Mood normal.          Additional Data:     Labs:  Results from last 7 days   Lab Units 02/20/24  0449   WBC Thousand/uL 10.77*   HEMOGLOBIN g/dL 12.6   HEMATOCRIT % 40.2   PLATELETS Thousands/uL 442*   NEUTROS PCT % 59   LYMPHS PCT % 30   MONOS PCT % 7   EOS PCT % 2     Results from last 7 days   Lab Units 02/20/24  0449   SODIUM mmol/L 138   POTASSIUM mmol/L 3.9   CHLORIDE mmol/L 105   CO2 mmol/L 23   BUN mg/dL 10   CREATININE mg/dL 0.53*   ANION GAP mmol/L 10   CALCIUM mg/dL 9.0   ALBUMIN g/dL 3.7   TOTAL BILIRUBIN mg/dL 0.29   ALK PHOS U/L 80   ALT U/L 9   AST U/L 8*   GLUCOSE RANDOM mg/dL 193*         Results from last 7 days   Lab Units 02/20/24  0842   POC GLUCOSE mg/dl 200*     Results from last 7 days   Lab Units 02/19/24  1006   HEMOGLOBIN A1C % 12.3*           Lines/Drains:  Invasive Devices       Peripheral Intravenous Line  Duration             Peripheral IV 02/19/24 Left Antecubital <1 day                      Telemetry:  Telemetry Orders (From admission, onward)               24  Hour Telemetry Monitoring  Continuous x 24 Hours (Telem)        Question:  Reason for 24 Hour Telemetry  Answer:  Arrhythmias requiring acute medical intervention / PPM or ICD malfunction                     ]             Imaging: Reviewed radiology reports from this admission including: arterial duplex, CTA, awaiting xray foot and echo     Recent Cultures (last 7 days):         Last 24 Hours Medication List:   Current Facility-Administered Medications   Medication Dose Route Frequency Provider Last Rate    acetaminophen  975 mg Oral Q8H PRN Keanu Olguin PA-C      aspirin  81 mg Oral Daily Maira Reyes PA-C      atorvastatin  40 mg Oral Daily With Dinner Kelly Martin PA-C      clopidogrel  75 mg Oral Daily Maira Reyes PA-C      enoxaparin  40 mg Subcutaneous Q12H OMID Pastor Sandra MD      HYDROmorphone  0.5 mg Intravenous Q6H PRN Keanu Olguin PA-C      insulin glargine  12 Units Subcutaneous QAM Kelly Martin PA-C      insulin lispro  2-12 Units Subcutaneous TID AC Kelly Martin PA-C      insulin lispro  2-12 Units Subcutaneous HS Kelly Martin PA-C      naloxone  0.04 mg Intravenous Q1MIN PRN Keanu Olguin PA-C      nicotine  1 patch Transdermal Daily Pastor Sandra MD      ondansetron  4 mg Intravenous Q6H PRN Keanu Olguin PA-C      oxyCODONE  2.5 mg Oral Q6H PRN Kenau Olguin PA-C      Or    oxyCODONE  5 mg Oral Q6H PRN Keanu Olguin PA-C      polyethylene glycol  17 g Oral Daily PRN Keanu Olguin PA-C          Today, Patient Was Seen By: Kelly Martin PA-C    **Please Note: This note may have been constructed using a voice recognition system.**

## 2024-02-20 NOTE — PHYSICAL THERAPY NOTE
Pt. 59 y.o.female presents with R toe pain. Pt admitted for PAD (peripheral artery disease) ([facility]) w/ Toe pain [M79.676]  Smoker [F17.200]  PAD (peripheral artery disease) ([facility]) [I73.9]  Great toe pain, right [M79.674]. WBAT RLE per podiatry. Pt referred to PT for functional mobility evaluation & D/C planning w/ orders of up & OOB as tolerated. PTA, pt reports being {[id number]}. During evaluation, deficits included dec mobility, balance, ambulation. Required *** Pt demonstrated dec endurance and tolerance to activity. Denies reports of dizziness or SOB t/o session. Pt was educated on fall precautions and reinforced w/ good understanding. Pt would benefit from continued PT to address deficits as defined above and maximize level of independence with functional mobility and safety. *** *** CM to follow. Nsg staff to continue to mobilized pt (OOB in chair for all meals & ambulate in room/unit) as tolerated to prevent further decline in function. Nsg notified. Co-eval performed to complete this PT evaluation for the pts best interest given pts medical complexity and functional level.         Reviewed HEP for MARIO and handout provided. All questions and concerns addressed at this time.         History reviewed. No pertinent past medical history.    Past Surgical History:   Procedure Laterality Date    APPENDECTOMY

## 2024-02-20 NOTE — H&P (VIEW-ONLY)
"e-Consult (IPC)  - Interventional Radiology  Georgie King 59 y.o. female MRN: [id number]  Unit/Bed#: [place] [address] -01 Encounter: [id number]          Interventional Radiology has been consulted to evaluate Gerogie King    We were consulted by vascular concerning this patient with PAD and right great toe atheroembolus.    Inpatient Consult to IR  Consult performed by: Poornima Portillo PA-C  Consult ordered by: KARY Souza        02/20/24    Assessment/Recommendation:   Patient is a 60 y/o female with a hx of tobacco use who presented to the ED yesterday with 2-3 weeks of right great toe pain, and a few days of swelling/discoloration. Per vascular notes, no motor or sensory deficit and pain isolated to the toe. Patient with atheroemboli likely from distal aorta or SFA, though cardiology consulted to r/o cardioembolic source. Recent LEADs with right tibioperoneal disease with occlusions vs high grade stenoses in AT, PT, and peroneal arteries. CTA runoff with \"large volume of noncalcific atherosclerotic plaque involving the distal infrarenal aorta with aortic luminal narrowing.\" Also with SFA disease, but poor evaluation more distal due to venous contamination. Per vascular notes, concern for worsening skin, potential for non-healing wound. We were consulted to evaluate patient for possible angiogram.    -Plan for IR RLE angiogram this week, pending IR schedule  -Please make NPO at midnight prior. Orders will be placed once timing confirmed  -Patient needs updated INR, orders placed. Target </=1.8  -Appreciate vascular recommendations  -Remainder of care per primary team  -Please reach out to IR with any questions/concerns    5-10 minutes, >50% of the total time devoted to medical consultative verbal/EMR discussion between providers. Written report will be generated in the EMR.     Thank you for allowing Interventional Radiology to participate in the care of Georgie King. Please don't hesitate to call or " TigerText us with any questions.     Poornima Portillo PA-C

## 2024-02-20 NOTE — PLAN OF CARE
Problem: PAIN - ADULT  Goal: Verbalizes/displays adequate comfort level or baseline comfort level  Description: Interventions:  - Encourage patient to monitor pain and request assistance  - Assess pain using appropriate pain scale  - Administer analgesics based on type and severity of pain and evaluate response  - Implement non-pharmacological measures as appropriate and evaluate response  - Consider cultural and social influences on pain and pain management  - Notify physician/advanced practitioner if interventions unsuccessful or patient reports new pain  Outcome: Progressing     Problem: SAFETY ADULT  Goal: Patient will remain free of falls  Description: INTERVENTIONS:  - Educate patient/family on patient safety including physical limitations  - Instruct patient to call for assistance with activity   - Consult OT/PT to assist with strengthening/mobility   - Keep Call bell within reach  - Keep bed low and locked with side rails adjusted as appropriate  - Keep care items and personal belongings within reach  - Initiate and maintain comfort rounds  - Make Fall Risk Sign visible to staff  - Offer Toileting every  Hours, in advance of need  - Initiate/Maintain alarm  - Obtain necessary fall risk management equipment:   - Apply yellow socks and bracelet for high fall risk patients  - Consider moving patient to room near nurses station  Outcome: Progressing  Goal: Maintain or return to baseline ADL function  Description: INTERVENTIONS:  -  Assess patient's ability to carry out ADLs; assess patient's baseline for ADL function and identify physical deficits which impact ability to perform ADLs (bathing, care of mouth/teeth, toileting, grooming, dressing, etc.)  - Assess/evaluate cause of self-care deficits   - Assess range of motion  - Assess patient's mobility; develop plan if impaired  - Assess patient's need for assistive devices and provide as appropriate  - Encourage maximum independence but intervene and supervise  when necessary  - Involve family in performance of ADLs  - Assess for home care needs following discharge   - Consider OT consult to assist with ADL evaluation and planning for discharge  - Provide patient education as appropriate  Outcome: Progressing  Goal: Maintains/Returns to pre admission functional level  Description: INTERVENTIONS:  - Perform AM-PAC 6 Click Basic Mobility/ Daily Activity assessment daily.  - Set and communicate daily mobility goal to care team and patient/family/caregiver.   - Collaborate with rehabilitation services on mobility goals if consulted  - Perform Range of Motion  times a day.  - Reposition patient every  hours.  - Dangle patient  times a day  - Stand patient  times a day  - Ambulate patient  times a day  - Out of bed to chair  times a day   - Out of bed for meals  times a day  - Out of bed for toileting  - Record patient progress and toleration of activity level   Outcome: Progressing     Problem: DISCHARGE PLANNING  Goal: Discharge to home or other facility with appropriate resources  Description: INTERVENTIONS:  - Identify barriers to discharge w/patient and caregiver  - Arrange for needed discharge resources and transportation as appropriate  - Identify discharge learning needs (meds, wound care, etc.)  - Arrange for interpretive services to assist at discharge as needed  - Refer to Case Management Department for coordinating discharge planning if the patient needs post-hospital services based on physician/advanced practitioner order or complex needs related to functional status, cognitive ability, or social support system  Outcome: Progressing     Problem: Knowledge Deficit  Goal: Patient/family/caregiver demonstrates understanding of disease process, treatment plan, medications, and discharge instructions  Description: Complete learning assessment and assess knowledge base.  Interventions:  - Provide teaching at level of understanding  - Provide teaching via preferred  learning methods  Outcome: Progressing     Problem: CARDIOVASCULAR - ADULT  Goal: Absence of cardiac dysrhythmias or at baseline rhythm  Description: INTERVENTIONS:  - Continuous cardiac monitoring, vital signs, obtain 12 lead EKG if ordered  - Administer antiarrhythmic and heart rate control medications as ordered  - Monitor electrolytes and administer replacement therapy as ordered  Outcome: Progressing     Problem: SKIN/TISSUE INTEGRITY - ADULT  Goal: Skin Integrity remains intact(Skin Breakdown Prevention)  Description: Assess:  -Perform Marty assessment every   -Clean and moisturize skin every   -Inspect skin when repositioning, toileting, and assisting with ADLS  -Assess under medical devices such as  every   -Assess extremities for adequate circulation and sensation     Bed Management:  -Have minimal linens on bed & keep smooth, unwrinkled  -Change linens as needed when moist or perspiring  -Avoid sitting or lying in one position for more than  hours while in bed  -Keep HOB at degrees     Toileting:  -Offer bedside commode  -Assess for incontinence every   -Use incontinent care products after each incontinent episode such as     Activity:  -Mobilize patient  times a day  -Encourage activity and walks on unit  -Encourage or provide ROM exercises   -Turn and reposition patient every  Hours  -Use appropriate equipment to lift or move patient in bed  -Instruct/ Assist with weight shifting every  when out of bed in chair  -Consider limitation of chair time  hour intervals    Skin Care:  -Avoid use of baby powder, tape, friction and shearing, hot water or constrictive clothing  -Relieve pressure over bony prominences using   -Do not massage red bony areas    Next Steps:  -Teach patient strategies to minimize risks such as    -Consider consults to  interdisciplinary teams such as   Outcome: Progressing  Goal: Incision(s), wounds(s) or drain site(s) healing without S/S of infection  Description: INTERVENTIONS  -  Assess and document dressing, incision, wound bed, drain sites and surrounding tissue  - Provide patient and family education  - Perform skin care/dressing changes every   Outcome: Progressing  Goal: Pressure injury heals and does not worsen  Description: Interventions:  - Implement low air loss mattress or specialty surface (Criteria met)  - Apply silicone foam dressing  - Instruct/assist with weight shifting every  minutes when in chair   - Limit chair time to  hour intervals  - Use special pressure reducing interventions such as  when in chair   - Apply fecal or urinary incontinence containment device   - Perform passive or active ROM every   - Turn and reposition patient & offload bony prominences every  hours   - Utilize friction reducing device or surface for transfers   - Consider consults to  interdisciplinary teams such as   - Use incontinent care products after each incontinent episode such as   - Consider nutrition services referral as needed  Outcome: Progressing     Problem: MUSCULOSKELETAL - ADULT  Goal: Maintain or return mobility to safest level of function  Description: INTERVENTIONS:  - Assess patient's ability to carry out ADLs; assess patient's baseline for ADL function and identify physical deficits which impact ability to perform ADLs (bathing, care of mouth/teeth, toileting, grooming, dressing, etc.)  - Assess/evaluate cause of self-care deficits   - Assess range of motion  - Assess patient's mobility  - Assess patient's need for assistive devices and provide as appropriate  - Encourage maximum independence but intervene and supervise when necessary  - Involve family in performance of ADLs  - Assess for home care needs following discharge   - Consider OT consult to assist with ADL evaluation and planning for discharge  - Provide patient education as appropriate  Outcome: Progressing  Goal: Maintain proper alignment of affected body part  Description: INTERVENTIONS:  - Support, maintain and  protect limb and body alignment  - Provide patient/ family with appropriate education  Outcome: Progressing

## 2024-02-21 ENCOUNTER — APPOINTMENT (INPATIENT)
Dept: RADIOLOGY | Facility: HOSPITAL | Age: 59
DRG: 269 | End: 2024-02-21
Payer: COMMERCIAL

## 2024-02-21 ENCOUNTER — ANESTHESIA (INPATIENT)
Dept: PERIOP | Facility: HOSPITAL | Age: 59
DRG: 269 | End: 2024-02-21
Payer: COMMERCIAL

## 2024-02-21 ENCOUNTER — ANESTHESIA EVENT (INPATIENT)
Dept: PERIOP | Facility: HOSPITAL | Age: 59
DRG: 269 | End: 2024-02-21
Payer: COMMERCIAL

## 2024-02-21 ENCOUNTER — ANESTHESIA (INPATIENT)
Dept: ANESTHESIOLOGY | Facility: HOSPITAL | Age: 59
DRG: 269 | End: 2024-02-21
Payer: COMMERCIAL

## 2024-02-21 ENCOUNTER — ANESTHESIA EVENT (INPATIENT)
Dept: ANESTHESIOLOGY | Facility: HOSPITAL | Age: 59
DRG: 269 | End: 2024-02-21
Payer: COMMERCIAL

## 2024-02-21 PROBLEM — E78.5 DYSLIPIDEMIA: Status: ACTIVE | Noted: 2024-02-21

## 2024-02-21 PROBLEM — I10 HTN (HYPERTENSION): Status: ACTIVE | Noted: 2024-02-21

## 2024-02-21 PROBLEM — I99.8 ISCHEMIA OF TOE: Status: ACTIVE | Noted: 2024-02-21

## 2024-02-21 LAB
ABO GROUP BLD: NORMAL
ABO GROUP BLD: NORMAL
ANION GAP SERPL CALCULATED.3IONS-SCNC: 9 MMOL/L
BASE EXCESS BLDA CALC-SCNC: -5 MMOL/L (ref -2–3)
BASE EXCESS BLDA CALC-SCNC: -6.2 MMOL/L
BASOPHILS # BLD AUTO: 0.06 THOUSANDS/ÂΜL (ref 0–0.1)
BASOPHILS NFR BLD AUTO: 0 % (ref 0–1)
BLD GP AB SCN SERPL QL: NEGATIVE
BUN SERPL-MCNC: 9 MG/DL (ref 5–25)
CA-I BLD-SCNC: 1.09 MMOL/L (ref 1.12–1.32)
CALCIUM SERPL-MCNC: 7.4 MG/DL (ref 8.4–10.2)
CHLORIDE SERPL-SCNC: 110 MMOL/L (ref 96–108)
CO2 SERPL-SCNC: 20 MMOL/L (ref 21–32)
CREAT SERPL-MCNC: 0.58 MG/DL (ref 0.6–1.3)
EOSINOPHIL # BLD AUTO: 0.11 THOUSAND/ÂΜL (ref 0–0.61)
EOSINOPHIL NFR BLD AUTO: 1 % (ref 0–6)
ERYTHROCYTE [DISTWIDTH] IN BLOOD BY AUTOMATED COUNT: 13.7 % (ref 11.6–15.1)
GFR SERPL CREATININE-BSD FRML MDRD: 100 ML/MIN/1.73SQ M
GLUCOSE SERPL-MCNC: 130 MG/DL (ref 65–140)
GLUCOSE SERPL-MCNC: 133 MG/DL (ref 65–140)
GLUCOSE SERPL-MCNC: 140 MG/DL (ref 65–140)
GLUCOSE SERPL-MCNC: 156 MG/DL (ref 65–140)
HCO3 BLDA-SCNC: 18.3 MMOL/L (ref 22–28)
HCO3 BLDA-SCNC: 20 MMOL/L (ref 22–28)
HCT VFR BLD AUTO: 32.6 % (ref 34.8–46.1)
HCT VFR BLD CALC: 28 % (ref 34.8–46.1)
HGB BLD-MCNC: 10.4 G/DL (ref 11.5–15.4)
HGB BLDA-MCNC: 9.5 G/DL (ref 11.5–15.4)
HOROWITZ INDEX BLDA+IHG-RTO: 60 MM[HG]
IMM GRANULOCYTES # BLD AUTO: 0.09 THOUSAND/UL (ref 0–0.2)
IMM GRANULOCYTES NFR BLD AUTO: 1 % (ref 0–2)
INR PPP: 1.13 (ref 0.84–1.19)
KCT BLD-ACNC: 234 SEC (ref 89–137)
KCT BLD-ACNC: 234 SEC (ref 89–137)
KCT BLD-ACNC: 240 SEC (ref 89–137)
KCT BLD-ACNC: 240 SEC (ref 89–137)
KCT BLD-ACNC: 258 SEC (ref 89–137)
LYMPHOCYTES # BLD AUTO: 3.47 THOUSANDS/ÂΜL (ref 0.6–4.47)
LYMPHOCYTES NFR BLD AUTO: 23 % (ref 14–44)
MCH RBC QN AUTO: 27.4 PG (ref 26.8–34.3)
MCHC RBC AUTO-ENTMCNC: 31.9 G/DL (ref 31.4–37.4)
MCV RBC AUTO: 86 FL (ref 82–98)
MONOCYTES # BLD AUTO: 0.74 THOUSAND/ÂΜL (ref 0.17–1.22)
MONOCYTES NFR BLD AUTO: 5 % (ref 4–12)
NEUTROPHILS # BLD AUTO: 10.7 THOUSANDS/ÂΜL (ref 1.85–7.62)
NEUTS SEG NFR BLD AUTO: 70 % (ref 43–75)
NRBC BLD AUTO-RTO: 0 /100 WBCS
O2 CT BLDA-SCNC: 15.4 ML/DL (ref 16–23)
OXYHGB MFR BLDA: 97.1 % (ref 94–97)
PCO2 BLD: 19 MMOL/L (ref 21–32)
PCO2 BLD: 26.9 MM HG (ref 36–44)
PCO2 BLDA: 42.5 MM HG (ref 36–44)
PEEP RESPIRATORY: 6 CM[H2O]
PH BLD: 7.44 [PH] (ref 7.35–7.45)
PH BLDA: 7.29 [PH] (ref 7.35–7.45)
PLATELET # BLD AUTO: 373 THOUSANDS/UL (ref 149–390)
PMV BLD AUTO: 10.3 FL (ref 8.9–12.7)
PO2 BLD: 147 MM HG (ref 75–129)
PO2 BLDA: 126.5 MM HG (ref 75–129)
POTASSIUM BLD-SCNC: 3.8 MMOL/L (ref 3.5–5.3)
POTASSIUM SERPL-SCNC: 3.8 MMOL/L (ref 3.5–5.3)
PROTHROMBIN TIME: 14.7 SECONDS (ref 11.6–14.5)
RBC # BLD AUTO: 3.8 MILLION/UL (ref 3.81–5.12)
RH BLD: POSITIVE
RH BLD: POSITIVE
SAO2 % BLD FROM PO2: 99 % (ref 60–85)
SODIUM BLD-SCNC: 140 MMOL/L (ref 136–145)
SODIUM SERPL-SCNC: 139 MMOL/L (ref 135–147)
SPECIMEN EXPIRATION DATE: NORMAL
SPECIMEN SOURCE: ABNORMAL
VENT AC: 16
VENT- AC: AC
VT SETTING VENT: 400 ML
WBC # BLD AUTO: 15.17 THOUSAND/UL (ref 4.31–10.16)

## 2024-02-21 PROCEDURE — 71045 X-RAY EXAM CHEST 1 VIEW: CPT

## 2024-02-21 PROCEDURE — C1725 CATH, TRANSLUMIN NON-LASER: HCPCS | Performed by: SURGERY

## 2024-02-21 PROCEDURE — 84132 ASSAY OF SERUM POTASSIUM: CPT

## 2024-02-21 PROCEDURE — 86901 BLOOD TYPING SEROLOGIC RH(D): CPT | Performed by: NURSE ANESTHETIST, CERTIFIED REGISTERED

## 2024-02-21 PROCEDURE — 86901 BLOOD TYPING SEROLOGIC RH(D): CPT | Performed by: STUDENT IN AN ORGANIZED HEALTH CARE EDUCATION/TRAINING PROGRAM

## 2024-02-21 PROCEDURE — C1769 GUIDE WIRE: HCPCS | Performed by: SURGERY

## 2024-02-21 PROCEDURE — 37184 PRIM ART M-THRMBC 1ST VSL: CPT | Performed by: SURGERY

## 2024-02-21 PROCEDURE — 82948 REAGENT STRIP/BLOOD GLUCOSE: CPT

## 2024-02-21 PROCEDURE — C1887 CATHETER, GUIDING: HCPCS | Performed by: SURGERY

## 2024-02-21 PROCEDURE — 82947 ASSAY GLUCOSE BLOOD QUANT: CPT

## 2024-02-21 PROCEDURE — 86850 RBC ANTIBODY SCREEN: CPT | Performed by: NURSE ANESTHETIST, CERTIFIED REGISTERED

## 2024-02-21 PROCEDURE — C1887 CATHETER, GUIDING: HCPCS

## 2024-02-21 PROCEDURE — C1769 GUIDE WIRE: HCPCS

## 2024-02-21 PROCEDURE — 75710 ARTERY X-RAYS ARM/LEG: CPT

## 2024-02-21 PROCEDURE — 3E05317 INTRODUCTION OF OTHER THROMBOLYTIC INTO PERIPHERAL ARTERY, PERCUTANEOUS APPROACH: ICD-10-PCS | Performed by: STUDENT IN AN ORGANIZED HEALTH CARE EDUCATION/TRAINING PROGRAM

## 2024-02-21 PROCEDURE — 36573 INSJ PICC RS&I 5 YR+: CPT

## 2024-02-21 PROCEDURE — C1760 CLOSURE DEV, VASC: HCPCS | Performed by: SURGERY

## 2024-02-21 PROCEDURE — 84295 ASSAY OF SERUM SODIUM: CPT

## 2024-02-21 PROCEDURE — C1894 INTRO/SHEATH, NON-LASER: HCPCS

## 2024-02-21 PROCEDURE — 36247 INS CATH ABD/L-EXT ART 3RD: CPT | Performed by: STUDENT IN AN ORGANIZED HEALTH CARE EDUCATION/TRAINING PROGRAM

## 2024-02-21 PROCEDURE — 82803 BLOOD GASES ANY COMBINATION: CPT

## 2024-02-21 PROCEDURE — 85025 COMPLETE CBC W/AUTO DIFF WBC: CPT | Performed by: STUDENT IN AN ORGANIZED HEALTH CARE EDUCATION/TRAINING PROGRAM

## 2024-02-21 PROCEDURE — 02HV33Z INSERTION OF INFUSION DEVICE INTO SUPERIOR VENA CAVA, PERCUTANEOUS APPROACH: ICD-10-PCS | Performed by: STUDENT IN AN ORGANIZED HEALTH CARE EDUCATION/TRAINING PROGRAM

## 2024-02-21 PROCEDURE — NC001 PR NO CHARGE: Performed by: SURGERY

## 2024-02-21 PROCEDURE — C1751 CATH, INF, PER/CENT/MIDLINE: HCPCS

## 2024-02-21 PROCEDURE — 80048 BASIC METABOLIC PNL TOTAL CA: CPT | Performed by: STUDENT IN AN ORGANIZED HEALTH CARE EDUCATION/TRAINING PROGRAM

## 2024-02-21 PROCEDURE — 04CP3ZZ EXTIRPATION OF MATTER FROM RIGHT ANTERIOR TIBIAL ARTERY, PERCUTANEOUS APPROACH: ICD-10-PCS | Performed by: STUDENT IN AN ORGANIZED HEALTH CARE EDUCATION/TRAINING PROGRAM

## 2024-02-21 PROCEDURE — 04V03DZ RESTRICTION OF ABDOMINAL AORTA WITH INTRALUMINAL DEVICE, PERCUTANEOUS APPROACH: ICD-10-PCS | Performed by: SURGERY

## 2024-02-21 PROCEDURE — 36248 INS CATH ABD/L-EXT ART ADDL: CPT | Performed by: STUDENT IN AN ORGANIZED HEALTH CARE EDUCATION/TRAINING PROGRAM

## 2024-02-21 PROCEDURE — C1773 RET DEV, INSERTABLE: HCPCS | Performed by: SURGERY

## 2024-02-21 PROCEDURE — 86900 BLOOD TYPING SEROLOGIC ABO: CPT | Performed by: STUDENT IN AN ORGANIZED HEALTH CARE EDUCATION/TRAINING PROGRAM

## 2024-02-21 PROCEDURE — 76937 US GUIDE VASCULAR ACCESS: CPT

## 2024-02-21 PROCEDURE — C1757 CATH, THROMBECTOMY/EMBOLECT: HCPCS | Performed by: SURGERY

## 2024-02-21 PROCEDURE — 82805 BLOOD GASES W/O2 SATURATION: CPT | Performed by: STUDENT IN AN ORGANIZED HEALTH CARE EDUCATION/TRAINING PROGRAM

## 2024-02-21 PROCEDURE — 37184 PRIM ART M-THRMBC 1ST VSL: CPT | Performed by: STUDENT IN AN ORGANIZED HEALTH CARE EDUCATION/TRAINING PROGRAM

## 2024-02-21 PROCEDURE — C1892 INTRO/SHEATH,FIXED,PEEL-AWAY: HCPCS | Performed by: SURGERY

## 2024-02-21 PROCEDURE — 80053 COMPREHEN METABOLIC PANEL: CPT | Performed by: PHYSICIAN ASSISTANT

## 2024-02-21 PROCEDURE — 85347 COAGULATION TIME ACTIVATED: CPT

## 2024-02-21 PROCEDURE — 85014 HEMATOCRIT: CPT

## 2024-02-21 PROCEDURE — 37211 THROMBOLYTIC ART THERAPY: CPT | Performed by: STUDENT IN AN ORGANIZED HEALTH CARE EDUCATION/TRAINING PROGRAM

## 2024-02-21 PROCEDURE — 86850 RBC ANTIBODY SCREEN: CPT | Performed by: STUDENT IN AN ORGANIZED HEALTH CARE EDUCATION/TRAINING PROGRAM

## 2024-02-21 PROCEDURE — 82330 ASSAY OF CALCIUM: CPT

## 2024-02-21 PROCEDURE — C1894 INTRO/SHEATH, NON-LASER: HCPCS | Performed by: SURGERY

## 2024-02-21 PROCEDURE — 37185 PRIM ART M-THRMBC SBSQ VSL: CPT | Performed by: STUDENT IN AN ORGANIZED HEALTH CARE EDUCATION/TRAINING PROGRAM

## 2024-02-21 PROCEDURE — 36573 INSJ PICC RS&I 5 YR+: CPT | Performed by: STUDENT IN AN ORGANIZED HEALTH CARE EDUCATION/TRAINING PROGRAM

## 2024-02-21 PROCEDURE — 37232 PR REVSC OPN/PRQ TIB/PERO W/ANGIOPLASTY UNI EA VSL: CPT | Performed by: SURGERY

## 2024-02-21 PROCEDURE — C1874 STENT, COATED/COV W/DEL SYS: HCPCS | Performed by: SURGERY

## 2024-02-21 PROCEDURE — 37228 PR REVSC OPN/PRQ TIB/PERO W/ANGIOPLASTY UNI: CPT | Performed by: SURGERY

## 2024-02-21 PROCEDURE — NC001 PR NO CHARGE: Performed by: PHYSICIAN ASSISTANT

## 2024-02-21 PROCEDURE — 37211 THROMBOLYTIC ART THERAPY: CPT

## 2024-02-21 PROCEDURE — 86900 BLOOD TYPING SEROLOGIC ABO: CPT | Performed by: NURSE ANESTHETIST, CERTIFIED REGISTERED

## 2024-02-21 PROCEDURE — 04CT3ZZ EXTIRPATION OF MATTER FROM RIGHT PERONEAL ARTERY, PERCUTANEOUS APPROACH: ICD-10-PCS | Performed by: STUDENT IN AN ORGANIZED HEALTH CARE EDUCATION/TRAINING PROGRAM

## 2024-02-21 PROCEDURE — 76937 US GUIDE VASCULAR ACCESS: CPT | Performed by: STUDENT IN AN ORGANIZED HEALTH CARE EDUCATION/TRAINING PROGRAM

## 2024-02-21 PROCEDURE — 94760 N-INVAS EAR/PLS OXIMETRY 1: CPT

## 2024-02-21 PROCEDURE — 99232 SBSQ HOSP IP/OBS MODERATE 35: CPT | Performed by: PHYSICIAN ASSISTANT

## 2024-02-21 PROCEDURE — 85610 PROTHROMBIN TIME: CPT | Performed by: PHYSICIAN ASSISTANT

## 2024-02-21 PROCEDURE — 86920 COMPATIBILITY TEST SPIN: CPT

## 2024-02-21 PROCEDURE — 94002 VENT MGMT INPAT INIT DAY: CPT

## 2024-02-21 PROCEDURE — 37184 PRIM ART M-THRMBC 1ST VSL: CPT

## 2024-02-21 PROCEDURE — 37236 OPEN/PERQ PLACE STENT 1ST: CPT | Performed by: SURGERY

## 2024-02-21 PROCEDURE — 77001 FLUOROGUIDE FOR VEIN DEVICE: CPT

## 2024-02-21 PROCEDURE — 37226 PR REVSC OPN/PRQ FEM/POP W/STNT/ANGIOP SM VSL: CPT | Performed by: SURGERY

## 2024-02-21 DEVICE — PERCLOSE™ PROSTYLE™ SUTURE-MEDIATED CLOSURE AND REPAIR SYSTEM
Type: IMPLANTABLE DEVICE | Site: GROIN | Status: FUNCTIONAL
Brand: PERCLOSE™ PROSTYLE™

## 2024-02-21 DEVICE — IMPLANTABLE DEVICE
Type: IMPLANTABLE DEVICE | Site: AORTA | Status: FUNCTIONAL
Brand: AFX2 BIFURCATED ENDOGRAFT SYSTEM

## 2024-02-21 DEVICE — IMPLANTABLE DEVICE: Type: IMPLANTABLE DEVICE | Site: GROIN | Status: FUNCTIONAL

## 2024-02-21 RX ORDER — CEFAZOLIN SODIUM 1 G/3ML
INJECTION, POWDER, FOR SOLUTION INTRAMUSCULAR; INTRAVENOUS AS NEEDED
Status: DISCONTINUED | OUTPATIENT
Start: 2024-02-21 | End: 2024-02-21

## 2024-02-21 RX ORDER — MIDAZOLAM HYDROCHLORIDE 2 MG/2ML
INJECTION, SOLUTION INTRAMUSCULAR; INTRAVENOUS AS NEEDED
Status: DISCONTINUED | OUTPATIENT
Start: 2024-02-21 | End: 2024-02-21

## 2024-02-21 RX ORDER — AMLODIPINE BESYLATE 5 MG/1
5 TABLET ORAL DAILY
Qty: 30 TABLET | Refills: 0 | Status: SHIPPED | OUTPATIENT
Start: 2024-02-22 | End: 2024-02-25

## 2024-02-21 RX ORDER — PROPOFOL 10 MG/ML
5-50 INJECTION, EMULSION INTRAVENOUS
Status: DISCONTINUED | OUTPATIENT
Start: 2024-02-21 | End: 2024-02-22

## 2024-02-21 RX ORDER — HEPARIN SODIUM 1000 [USP'U]/ML
9200 INJECTION, SOLUTION INTRAVENOUS; SUBCUTANEOUS EVERY 6 HOURS PRN
Status: DISCONTINUED | OUTPATIENT
Start: 2024-02-21 | End: 2024-02-23

## 2024-02-21 RX ORDER — ROCURONIUM BROMIDE 10 MG/ML
INJECTION, SOLUTION INTRAVENOUS AS NEEDED
Status: DISCONTINUED | OUTPATIENT
Start: 2024-02-21 | End: 2024-02-21

## 2024-02-21 RX ORDER — SUCCINYLCHOLINE/SOD CL,ISO/PF 100 MG/5ML
SYRINGE (ML) INTRAVENOUS AS NEEDED
Status: DISCONTINUED | OUTPATIENT
Start: 2024-02-21 | End: 2024-02-21

## 2024-02-21 RX ORDER — LANCETS 33 GAUGE
EACH MISCELLANEOUS
Qty: 200 EACH | Refills: 0 | Status: SHIPPED | OUTPATIENT
Start: 2024-02-21 | End: 2024-02-25

## 2024-02-21 RX ORDER — ASPIRIN 81 MG/1
81 TABLET, CHEWABLE ORAL DAILY
Qty: 30 TABLET | Refills: 0 | Status: SHIPPED | OUTPATIENT
Start: 2024-02-22 | End: 2024-02-25

## 2024-02-21 RX ORDER — FENTANYL CITRATE 50 UG/ML
25 INJECTION, SOLUTION INTRAMUSCULAR; INTRAVENOUS EVERY 2 HOUR PRN
Status: DISCONTINUED | OUTPATIENT
Start: 2024-02-21 | End: 2024-02-21

## 2024-02-21 RX ORDER — SODIUM CHLORIDE 9 MG/ML
INJECTION, SOLUTION INTRAVENOUS CONTINUOUS PRN
Status: DISCONTINUED | OUTPATIENT
Start: 2024-02-21 | End: 2024-02-21

## 2024-02-21 RX ORDER — INSULIN GLARGINE 100 [IU]/ML
18 INJECTION, SOLUTION SUBCUTANEOUS EVERY MORNING
Status: DISCONTINUED | OUTPATIENT
Start: 2024-02-22 | End: 2024-02-22

## 2024-02-21 RX ORDER — SODIUM CHLORIDE, SODIUM LACTATE, POTASSIUM CHLORIDE, CALCIUM CHLORIDE 600; 310; 30; 20 MG/100ML; MG/100ML; MG/100ML; MG/100ML
INJECTION, SOLUTION INTRAVENOUS CONTINUOUS PRN
Status: DISCONTINUED | OUTPATIENT
Start: 2024-02-21 | End: 2024-02-21

## 2024-02-21 RX ORDER — HEPARIN SODIUM 1000 [USP'U]/ML
4600 INJECTION, SOLUTION INTRAVENOUS; SUBCUTANEOUS EVERY 6 HOURS PRN
Status: DISCONTINUED | OUTPATIENT
Start: 2024-02-21 | End: 2024-02-23

## 2024-02-21 RX ORDER — FENTANYL CITRATE-0.9 % NACL/PF 10 MCG/ML
50 PLASTIC BAG, INJECTION (ML) INTRAVENOUS CONTINUOUS
Status: DISCONTINUED | OUTPATIENT
Start: 2024-02-21 | End: 2024-02-22

## 2024-02-21 RX ORDER — HEPARIN SODIUM 200 [USP'U]/100ML
20 INJECTION, SOLUTION INTRAVENOUS CONTINUOUS
Status: DISCONTINUED | OUTPATIENT
Start: 2024-02-21 | End: 2024-02-21

## 2024-02-21 RX ORDER — ONDANSETRON 2 MG/ML
INJECTION INTRAMUSCULAR; INTRAVENOUS AS NEEDED
Status: DISCONTINUED | OUTPATIENT
Start: 2024-02-21 | End: 2024-02-21

## 2024-02-21 RX ORDER — PROPOFOL 10 MG/ML
INJECTION, EMULSION INTRAVENOUS AS NEEDED
Status: DISCONTINUED | OUTPATIENT
Start: 2024-02-21 | End: 2024-02-21

## 2024-02-21 RX ORDER — FENTANYL CITRATE 50 UG/ML
INJECTION, SOLUTION INTRAMUSCULAR; INTRAVENOUS AS NEEDED
Status: DISCONTINUED | OUTPATIENT
Start: 2024-02-21 | End: 2024-02-21

## 2024-02-21 RX ORDER — PHENYLEPHRINE HCL IN 0.9% NACL 1 MG/10 ML
SYRINGE (ML) INTRAVENOUS AS NEEDED
Status: DISCONTINUED | OUTPATIENT
Start: 2024-02-21 | End: 2024-02-21

## 2024-02-21 RX ORDER — HEPARIN SODIUM 10000 [USP'U]/100ML
3-30 INJECTION, SOLUTION INTRAVENOUS
Status: DISCONTINUED | OUTPATIENT
Start: 2024-02-21 | End: 2024-02-23

## 2024-02-21 RX ORDER — INSULIN GLARGINE 100 [IU]/ML
8 INJECTION, SOLUTION SUBCUTANEOUS EVERY MORNING
Status: DISCONTINUED | OUTPATIENT
Start: 2024-02-21 | End: 2024-02-21

## 2024-02-21 RX ORDER — LIDOCAINE HYDROCHLORIDE 20 MG/ML
INJECTION, SOLUTION EPIDURAL; INFILTRATION; INTRACAUDAL; PERINEURAL AS NEEDED
Status: DISCONTINUED | OUTPATIENT
Start: 2024-02-21 | End: 2024-02-21

## 2024-02-21 RX ORDER — IODIXANOL 320 MG/ML
INJECTION, SOLUTION INTRAVASCULAR AS NEEDED
Status: DISCONTINUED | OUTPATIENT
Start: 2024-02-21 | End: 2024-02-21 | Stop reason: HOSPADM

## 2024-02-21 RX ORDER — HEPARIN SODIUM 200 [USP'U]/100ML
INJECTION, SOLUTION INTRAVENOUS
Status: COMPLETED | OUTPATIENT
Start: 2024-02-21 | End: 2024-02-21

## 2024-02-21 RX ORDER — ATORVASTATIN CALCIUM 40 MG/1
40 TABLET, FILM COATED ORAL
Qty: 30 TABLET | Refills: 0 | Status: SHIPPED | OUTPATIENT
Start: 2024-02-21 | End: 2024-02-25

## 2024-02-21 RX ORDER — HYDROMORPHONE HCL/PF 1 MG/ML
SYRINGE (ML) INJECTION AS NEEDED
Status: DISCONTINUED | OUTPATIENT
Start: 2024-02-21 | End: 2024-02-21

## 2024-02-21 RX ORDER — CEFAZOLIN SODIUM 1 G/50ML
1000 SOLUTION INTRAVENOUS EVERY 8 HOURS
Qty: 100 ML | Refills: 0 | Status: COMPLETED | OUTPATIENT
Start: 2024-02-22 | End: 2024-02-22

## 2024-02-21 RX ORDER — CLOPIDOGREL BISULFATE 75 MG/1
75 TABLET ORAL DAILY
Qty: 30 TABLET | Refills: 0 | Status: SHIPPED | OUTPATIENT
Start: 2024-02-22 | End: 2024-02-25

## 2024-02-21 RX ORDER — EPHEDRINE SULFATE 50 MG/ML
INJECTION INTRAVENOUS AS NEEDED
Status: DISCONTINUED | OUTPATIENT
Start: 2024-02-21 | End: 2024-02-21

## 2024-02-21 RX ORDER — GLUCOSAMINE HCL/CHONDROITIN SU 500-400 MG
CAPSULE ORAL
Qty: 200 EACH | Refills: 0 | Status: SHIPPED | OUTPATIENT
Start: 2024-02-21 | End: 2024-02-25

## 2024-02-21 RX ORDER — LIDOCAINE WITH 8.4% SOD BICARB 0.9%(10ML)
SYRINGE (ML) INJECTION AS NEEDED
Status: COMPLETED | OUTPATIENT
Start: 2024-02-21 | End: 2024-02-21

## 2024-02-21 RX ORDER — BLOOD SUGAR DIAGNOSTIC
STRIP MISCELLANEOUS
Qty: 200 EACH | Refills: 0 | Status: SHIPPED | OUTPATIENT
Start: 2024-02-21 | End: 2024-02-25

## 2024-02-21 RX ORDER — CHLORHEXIDINE GLUCONATE ORAL RINSE 1.2 MG/ML
15 SOLUTION DENTAL EVERY 12 HOURS SCHEDULED
Status: DISCONTINUED | OUTPATIENT
Start: 2024-02-21 | End: 2024-02-22

## 2024-02-21 RX ORDER — BLOOD-GLUCOSE METER
KIT MISCELLANEOUS
Qty: 1 KIT | Refills: 0 | Status: SHIPPED | OUTPATIENT
Start: 2024-02-21 | End: 2024-02-25

## 2024-02-21 RX ORDER — IODIXANOL 320 MG/ML
200 INJECTION, SOLUTION INTRAVASCULAR
Status: COMPLETED | OUTPATIENT
Start: 2024-02-21 | End: 2024-02-21

## 2024-02-21 RX ORDER — HEPARIN SODIUM 1000 [USP'U]/ML
INJECTION, SOLUTION INTRAVENOUS; SUBCUTANEOUS AS NEEDED
Status: DISCONTINUED | OUTPATIENT
Start: 2024-02-21 | End: 2024-02-21

## 2024-02-21 RX ADMIN — PROPOFOL 50 MG: 10 INJECTION, EMULSION INTRAVENOUS at 13:02

## 2024-02-21 RX ADMIN — CLOPIDOGREL BISULFATE 75 MG: 75 TABLET ORAL at 08:05

## 2024-02-21 RX ADMIN — SODIUM CHLORIDE: 0.9 INJECTION, SOLUTION INTRAVENOUS at 19:12

## 2024-02-21 RX ADMIN — Medication 25 MCG/HR: at 23:18

## 2024-02-21 RX ADMIN — SODIUM CHLORIDE, SODIUM LACTATE, POTASSIUM CHLORIDE, CALCIUM CHLORIDE: 600; 310; 30; 20 INJECTION, SOLUTION INTRAVENOUS at 17:06

## 2024-02-21 RX ADMIN — AMLODIPINE BESYLATE 5 MG: 5 TABLET ORAL at 08:04

## 2024-02-21 RX ADMIN — IODIXANOL 114 ML: 320 INJECTION, SOLUTION INTRAVASCULAR at 15:13

## 2024-02-21 RX ADMIN — INSULIN GLARGINE 8 UNITS: 100 INJECTION, SOLUTION SUBCUTANEOUS at 08:59

## 2024-02-21 RX ADMIN — HEPARIN SODIUM 2000 UNITS: 1000 INJECTION INTRAVENOUS; SUBCUTANEOUS at 19:45

## 2024-02-21 RX ADMIN — MIDAZOLAM 2 MG: 1 INJECTION INTRAMUSCULAR; INTRAVENOUS at 21:27

## 2024-02-21 RX ADMIN — FENTANYL CITRATE 25 MCG: 50 INJECTION INTRAMUSCULAR; INTRAVENOUS at 12:53

## 2024-02-21 RX ADMIN — ONDANSETRON 4 MG: 2 INJECTION INTRAMUSCULAR; INTRAVENOUS at 15:24

## 2024-02-21 RX ADMIN — Medication 100 MCG: at 13:12

## 2024-02-21 RX ADMIN — HEPARIN SODIUM 3000 UNITS: 1000 INJECTION INTRAVENOUS; SUBCUTANEOUS at 20:29

## 2024-02-21 RX ADMIN — PROPOFOL 5 MCG/KG/MIN: 10 INJECTION, EMULSION INTRAVENOUS at 22:22

## 2024-02-21 RX ADMIN — ROCURONIUM BROMIDE 20 MG: 10 INJECTION, SOLUTION INTRAVENOUS at 14:05

## 2024-02-21 RX ADMIN — MIDAZOLAM 2 MG: 1 INJECTION INTRAMUSCULAR; INTRAVENOUS at 12:39

## 2024-02-21 RX ADMIN — ACETAMINOPHEN 325MG 975 MG: 325 TABLET ORAL at 07:57

## 2024-02-21 RX ADMIN — SUGAMMADEX 200 MG: 100 INJECTION, SOLUTION INTRAVENOUS at 15:50

## 2024-02-21 RX ADMIN — ROCURONIUM BROMIDE 10 MG: 10 INJECTION, SOLUTION INTRAVENOUS at 14:28

## 2024-02-21 RX ADMIN — Medication 6 ML: at 12:57

## 2024-02-21 RX ADMIN — ROCURONIUM BROMIDE 50 MG: 10 INJECTION, SOLUTION INTRAVENOUS at 21:25

## 2024-02-21 RX ADMIN — HEPARIN SODIUM 4000 UNITS: 1000 INJECTION INTRAVENOUS; SUBCUTANEOUS at 14:22

## 2024-02-21 RX ADMIN — EPHEDRINE SULFATE 10 MG: 50 INJECTION, SOLUTION INTRAVENOUS at 17:16

## 2024-02-21 RX ADMIN — FENTANYL CITRATE 25 MCG: 50 INJECTION INTRAMUSCULAR; INTRAVENOUS at 13:33

## 2024-02-21 RX ADMIN — FENTANYL CITRATE 25 MCG: 50 INJECTION, SOLUTION INTRAMUSCULAR; INTRAVENOUS at 22:25

## 2024-02-21 RX ADMIN — ALTEPLASE 4 MG: 2.2 INJECTION, POWDER, LYOPHILIZED, FOR SOLUTION INTRAVENOUS at 15:07

## 2024-02-21 RX ADMIN — FENTANYL CITRATE 25 MCG: 50 INJECTION INTRAMUSCULAR; INTRAVENOUS at 13:47

## 2024-02-21 RX ADMIN — HEPARIN SODIUM 11000 UNITS: 1000 INJECTION INTRAVENOUS; SUBCUTANEOUS at 18:16

## 2024-02-21 RX ADMIN — CEFAZOLIN 2000 MG: 1 INJECTION, POWDER, FOR SOLUTION INTRAMUSCULAR; INTRAVENOUS at 20:40

## 2024-02-21 RX ADMIN — HEPARIN SODIUM 3000 UNITS: 1000 INJECTION INTRAVENOUS; SUBCUTANEOUS at 19:09

## 2024-02-21 RX ADMIN — MIDAZOLAM 2 MG: 1 INJECTION INTRAMUSCULAR; INTRAVENOUS at 16:36

## 2024-02-21 RX ADMIN — SODIUM CHLORIDE, SODIUM LACTATE, POTASSIUM CHLORIDE, AND CALCIUM CHLORIDE: .6; .31; .03; .02 INJECTION, SOLUTION INTRAVENOUS at 17:06

## 2024-02-21 RX ADMIN — SODIUM CHLORIDE: 9 INJECTION, SOLUTION INTRAVENOUS at 19:25

## 2024-02-21 RX ADMIN — CEFAZOLIN 2000 MG: 1 INJECTION, POWDER, FOR SOLUTION INTRAMUSCULAR; INTRAVENOUS at 17:02

## 2024-02-21 RX ADMIN — HEPARIN SODIUM 6000 UNITS: 1000 INJECTION INTRAVENOUS; SUBCUTANEOUS at 13:41

## 2024-02-21 RX ADMIN — FENTANYL CITRATE 100 MCG: 50 INJECTION INTRAMUSCULAR; INTRAVENOUS at 17:05

## 2024-02-21 RX ADMIN — LIDOCAINE HYDROCHLORIDE 80 MG: 20 INJECTION, SOLUTION EPIDURAL; INFILTRATION; INTRACAUDAL at 12:40

## 2024-02-21 RX ADMIN — HYDROMORPHONE HYDROCHLORIDE 0.5 MG: 1 INJECTION, SOLUTION INTRAMUSCULAR; INTRAVENOUS; SUBCUTANEOUS at 20:05

## 2024-02-21 RX ADMIN — ASPIRIN 81 MG CHEWABLE TABLET 81 MG: 81 TABLET CHEWABLE at 08:05

## 2024-02-21 RX ADMIN — FENTANYL CITRATE 100 MCG: 50 INJECTION INTRAMUSCULAR; INTRAVENOUS at 21:27

## 2024-02-21 RX ADMIN — OXYCODONE HYDROCHLORIDE 5 MG: 5 TABLET ORAL at 03:00

## 2024-02-21 RX ADMIN — Medication 100 MCG: at 17:09

## 2024-02-21 RX ADMIN — PROPOFOL 200 MG: 10 INJECTION, EMULSION INTRAVENOUS at 12:40

## 2024-02-21 RX ADMIN — Medication 150 MCG: at 20:24

## 2024-02-21 RX ADMIN — ENOXAPARIN SODIUM 40 MG: 40 INJECTION SUBCUTANEOUS at 08:05

## 2024-02-21 RX ADMIN — Medication 50 MCG: at 13:09

## 2024-02-21 RX ADMIN — FENTANYL CITRATE 25 MCG: 50 INJECTION INTRAMUSCULAR; INTRAVENOUS at 12:48

## 2024-02-21 RX ADMIN — SODIUM CHLORIDE: 0.9 INJECTION, SOLUTION INTRAVENOUS at 12:36

## 2024-02-21 RX ADMIN — HYDROMORPHONE HYDROCHLORIDE 0.5 MG: 1 INJECTION, SOLUTION INTRAMUSCULAR; INTRAVENOUS; SUBCUTANEOUS at 18:00

## 2024-02-21 RX ADMIN — ROCURONIUM BROMIDE 30 MG: 10 INJECTION, SOLUTION INTRAVENOUS at 17:00

## 2024-02-21 RX ADMIN — Medication 100 MG: at 13:02

## 2024-02-21 RX ADMIN — OXYCODONE HYDROCHLORIDE 5 MG: 5 TABLET ORAL at 09:00

## 2024-02-21 NOTE — ASSESSMENT & PLAN NOTE
New diagnosis   Started on norvasc 5 mg daily   Given DM can considering adding los dose ACE as well post agram   Monitor VS

## 2024-02-21 NOTE — INTERVAL H&P NOTE
Update:     Patient was re-evaluated prior to procedure. She is having severe right great toe pain, which is continuing to worsen.    The great toe itself blue/purple and cold, with adjacent erythema of the forefoot.  There is also blue discoloration of the distal 2nd, 3rd, and 5th toes.  Sensorimotor function remains preserved.    Discussed risks and benefits of intervention, including further embolization of soft plaques.  Will proceed with angiography and intervention with sedation provided by anesthesiology given her degree of pain.      The prompt availability of anesthesiology for assistance with sedation is much appreciated.    Serge Traylor MD/February 21, 2024/12:14 PM

## 2024-02-21 NOTE — ASSESSMENT & PLAN NOTE
Presented to [facility] on 2/19 due to worsening right great toe pain for the past 2 weeks with associated discoloration.  Patient denies fever or chills.  Podiatry evaluation recommended vascular surgery intervention  Leukocytosis of 12.23.  Thrombocytosis 472.   X-ray great toe 2/19/2024: Mild soft tissue swelling overlying the first toe with no acute osseous abnormality.  LEAD 2/19/2024: Right lower limb: Diffuse arterial occlusive disease throughout femoral-popliteal segments without focal stenosis. Evidence of tibial peritoneal disease with occlusion versus high-grade stenosis in the anterior tibial, posterior tibial, and peroneal arteries. EVELIA of 1.39, however may not be reliable secondary to poor compressible vessels. EVELIA left lower limb: 1.32. Of note, multiple echogenic structures located in the right inguinal region suggestive of enlarged lymphatic channels.  CTA abdomen 2/19/24: noncalcific atherosclerotic plaque involving the distal infrarenal aorta with aortic luminal narrowing. Right superficial femoral artery disease related to nonclcific plaque. Poor evaluation of tibial runoff given venous contamination, however  the tibial vessels do appear intact with faint filling of the plantar artery. No significant left lower extremity arterial disease. Splenic artery occlusion, likely chronic. No evidence of splenic infarct. There is decreased arterial mottling suggestive of decreased perfusion.  Likely secondary to atherosclerotic plaque embolism from distal infrarenal aorta/SFA      RLE angiogram today  See A/P PAD

## 2024-02-21 NOTE — ASSESSMENT & PLAN NOTE
Has not seen a doctor for a while. Presented with hyperglycemia   A1c 12.3% , new diagnosis   Start modest basal/bolus regimen   Decrease thsi AM to lantus 8 units while NPO   Then can increase back to 18 units in AM, hunalog 5 untis tid with meals - held Am dose while NPO   Of note, despite education she is refusing to go home with insulin   She will be high risk for infection and complications without appropriate ocntrol of DM   Will need to send on at least two oral agents if continues to refuse insulin   Endocrinology referral   Should be sent with glucoemter as well   Will need opthalmology follow up outpt   once back on diet ADA   SSI, accuchecks   Nutrition consulted

## 2024-02-21 NOTE — PROGRESS NOTES
[facility]  Progress Note  Name: Georgie King I  MRN: [id number]  Unit/Bed#: [place] [address] -01 I Date of Admission: 2/19/2024   Date of Service: 2/21/2024 I Hospital Day: 2    Assessment/Plan   * PAD (peripheral artery disease) (HCC)  Assessment & Plan  58 yo female smoker w/ no past medical history presented to [facility] on 2/19/24 w/ R toe pain. Pt was diagnosed w/ distal embolism to R toes likely secondary to atherosclerotic plaque. Pt was seen in consult by cardiology. Echo showed normal EF w/ no evidence of intracardiac thrombus. During her hospital stay, she is newly diagnosed w/ HTN, dyslipidemia, obesity, DM II and severe PAD.    Vascular surgery consulted for R great toe pain. LEAD showed tibioperoneal disease w/ occlusions vs high grade stenoses in the R AT, PT and peroneal arteries, likely secondary to atheroemboli from distal aorta or SFA. CTA abdomen w/ B/L runoff showed noncalcific atherosclerotic plaque involving the distal infrarenal aorta w/ aortic luminal narrowing and R SFA disease. Plan for RLE angiogram in IR today (2/21).     Diagnostics:  -2/19/24 R great toe xray: Mild soft tissue swelling overlying the first toe with no acute osseous abnormality.   -2/19/24 LEAD: Right: Evidence of diffuse arterial occlusive disease throughout the femoropopliteal segments without focal stenosis. Evidence of tibioperoneal disease with occlusions vs high grade stenoses in the anterior tibial, posterior tibial and peroneal arteries. R EVELIA: 1.39/-/- (poorly compressible vessels). Left: L EVELIA: 1.32/220/110.  -2/19/24 CTA abdomen w/ runoff: Large volume of noncalcific atherosclerotic plaque involving the distal infrarenal aorta with aortic luminal narrowing. R SFA disease related to noncalcific plaque. The tibial runoff is poorly evaluated given venous contamination; however, the tibial vessels do appear intact with faint filling of the plantar artery. The dorsalis pedis is not well  "visualized. No significant left lower extremity arterial disease. Splenic artery occlusion, likely chronic. No evidence of splenic infarct. There is decreased arterial mottling suggestive of decreased perfusion.    Plan:  -R great toe ischemia and pain without sensory/motor deficits  -Likely secondary to atherosclerotic plaque embolism from distal infrarenal aorta/SFA   -LEAD showed tibioperoneal disease w/ occlusions vs high grade stenoses in the R AT, PT and peroneal arteries; R EVELIA: 1.39/-/-  -Plan for RLE angiogram w/ possible intervention in IR today (2/21)  -Keep pt NPO for IR today  -Continue ASA, plavix and lipitor for medical management of PAD  -Cardiology following to rule out cardioembolic source; input appreciated  -Smoking cessation  -Will discuss w/ Dr. Hayes      Subjective:  Pt seen for exam while sitting in her chair; NAD. Pt reports ongoing pain in R great toe w/ partially effective relief from pain medication. Otherwise, she denies any further complaints at this time.    Vitals:  /97   Pulse 87   Temp (!) 97.4 °F (36.3 °C)   Resp 20   Ht 5' 1\" (1.549 m)   Wt 117 kg (257 lb 15 oz)   SpO2 95%   BMI 48.74 kg/m²     I/Os:  No intake/output data recorded.  No intake/output data recorded.    Lab Results and Cultures:   Lab Results   Component Value Date    WBC 10.77 (H) 02/20/2024    HGB 12.6 02/20/2024    HCT 40.2 02/20/2024    MCV 85 02/20/2024     (H) 02/20/2024     Lab Results   Component Value Date    GLUCOSE 111 03/16/2015    CALCIUM 9.0 02/20/2024     03/16/2015    K 3.9 02/20/2024    CO2 23 02/20/2024     02/20/2024    BUN 10 02/20/2024    CREATININE 0.53 (L) 02/20/2024     Lab Results   Component Value Date    INR 1.13 02/21/2024    PROTIME 14.7 (H) 02/21/2024        Medications:  Current Facility-Administered Medications   Medication Dose Route Frequency    acetaminophen (TYLENOL) tablet 975 mg  975 mg Oral Q8H PRN    amLODIPine (NORVASC) tablet 5 mg  5 mg Oral " Daily    aspirin chewable tablet 81 mg  81 mg Oral Daily    atorvastatin (LIPITOR) tablet 40 mg  40 mg Oral Daily With Dinner    clopidogrel (PLAVIX) tablet 75 mg  75 mg Oral Daily    enoxaparin (LOVENOX) subcutaneous injection 40 mg  40 mg Subcutaneous Q12H OMID    HYDROmorphone (DILAUDID) injection 0.5 mg  0.5 mg Intravenous Q6H PRN    [START ON 2/22/2024] insulin glargine (LANTUS) subcutaneous injection 18 Units 0.18 mL  18 Units Subcutaneous QAM    insulin lispro (HumaLOG) 100 units/mL subcutaneous injection 2-12 Units  2-12 Units Subcutaneous TID AC    insulin lispro (HumaLOG) 100 units/mL subcutaneous injection 2-12 Units  2-12 Units Subcutaneous HS    insulin lispro (HumaLOG) 100 units/mL subcutaneous injection 5 Units  5 Units Subcutaneous TID With Meals    naloxone (NARCAN) 0.04 mg/mL syringe 0.04 mg  0.04 mg Intravenous Q1MIN PRN    nicotine (NICODERM CQ) 21 mg/24 hr TD 24 hr patch 1 patch  1 patch Transdermal Daily    ondansetron (ZOFRAN) injection 4 mg  4 mg Intravenous Q6H PRN    oxyCODONE (ROXICODONE) split tablet 2.5 mg  2.5 mg Oral Q6H PRN    Or    oxyCODONE (ROXICODONE) IR tablet 5 mg  5 mg Oral Q6H PRN    polyethylene glycol (MIRALAX) packet 17 g  17 g Oral Daily PRN       Imaging:  See above    Physical Exam:    General appearance: alert and oriented, in no acute distress  Skin: Skin color, texture, turgor normal. No rashes or lesions  Neurologic: Grossly normal  Head: Normocephalic, without obvious abnormality, atraumatic  Lungs: clear to auscultation bilaterally  Heart: regular rate and rhythm, S1, S2 normal, no murmur, click, rub or gallop  Abdomen:  obese, soft, non-tender  Extremities:  R great toe cool, (+) motor/sensation; LLE warm, well-perfused      Pulse exam:  AT: Right: doppler signal   DP: Right:  absent  Left: 1+  PT: Right: doppler signal Left: doppler signal      KARY Olivo  2/21/2024

## 2024-02-21 NOTE — ASSESSMENT & PLAN NOTE
Newly diagnosed during this admission. Xanthelasmas bilateral eyelids   Cholesterol 162  Triglycerides 204  HDL 43  LDL 78    Atorvastatin 40 mg daily

## 2024-02-21 NOTE — ANESTHESIA PREPROCEDURE EVALUATION
Procedure:  IR LOWER EXTREMITY ANGIOGRAM  EMBOLECTOMY/THROMBECTOMY LOWER EXTREMITY Right (Right: Leg Lower)  IR LOWER EXTREMITY ANGIOGRAM    Relevant Problems   ANESTHESIA (within normal limits)      CARDIO   (+) HTN (hypertension)      ENDO   (+) Type 2 diabetes mellitus, without long-term current use of insulin (HCC)      GI/HEPATIC (within normal limits)        Physical Exam    Airway    Mallampati score: II  TM Distance: >3 FB  Neck ROM: full     Dental        Cardiovascular  Cardiovascular exam normal    Pulmonary  Pulmonary exam normal     Other Findings  post-pubertal.      Anesthesia Plan  ASA Score- 3 Emergent    Anesthesia Type- general with ASA Monitors.         Additional Monitors:     Airway Plan: ETT.           Plan Factors-    Chart reviewed.   Existing labs reviewed. Patient summary reviewed.    Patient is a current smoker.  Patient instructed to abstain from smoking on day of procedure. Patient did not smoke on day of surgery.    Obstructive sleep apnea risk education given perioperatively.        Induction- intravenous.    Postoperative Plan- Plan for postoperative opioid use. Planned trial extubation    Informed Consent- Anesthetic plan and risks discussed with patient.

## 2024-02-21 NOTE — PROGRESS NOTES
[facility]  Progress Note  Name: Georgie King I  MRN: [id number]  Unit/Bed#: [place] [address] -01 I Date of Admission: 2/19/2024   Date of Service: 2/21/2024 I Hospital Day: 2    Assessment/Plan   * PAD (peripheral artery disease) (HCC)  Assessment & Plan  59 year old female presented to our ED due to two weeks of right great toe pain and discoloration.   Appreciate podiatry, vascular and cardiology recommendations  No surgical intervention planned per podiatry   For IR agram today RLE   Arterial duplex - right diffuse arterial occlusive disease throughout the femoral-popliteal segments without focal stenosis, tibioperoneal disease with occlusions versus high-grade stenosis in the anterior tibial posterior tibial and peroneal arteries  CTA large-volume noncalcific atherosclerotic plaque involving the distal infrarenal aorta with aortic luminal narrowing, right SFA disease, splenic artery occlusion likely chronic no evidence of splenic infarct  DAPT with aspirin and plavix initiated   ASCVD is at 16.8%, started on lipitor 40 mg daily       HTN (hypertension)  Assessment & Plan  New diagnosis   Started on norvasc 5 mg daily   Given DM can considering adding los dose ACE as well post agram   Monitor VS     Morbid obesity (HCC)  Assessment & Plan  BMI 48 noted   Nutritionist consulted   Would benefit from weight loss       Abnormal CT scan  Assessment & Plan  Noting prominent left ovary Nonemergent pelvic ultrasound can be obtained if clinically warranted   Incidental note completed  I discussed this with patient and need for outpt follow up - she demonstrated understanding     Type 2 diabetes mellitus, without long-term current use of insulin (HCC)  Assessment & Plan  Has not seen a doctor for a while. Presented with hyperglycemia   A1c 12.3% , new diagnosis   Start modest basal/bolus regimen   Decrease thsi AM to lantus 8 units while NPO   Then can increase back to 18 units in AM, hunalog 5  untis tid with meals - held Am dose while NPO   Of note, despite education she is refusing to go home with insulin   She will be high risk for infection and complications without appropriate ocntrol of DM   Will need to send on at least two oral agents if continues to refuse insulin   Endocrinology referral   Should be sent with glucoemter as well   Will need opthalmology follow up outpt   once back on diet ADA   SSI, accuchecks   Nutrition consulted       Tobacco abuse  Assessment & Plan   Nicotine patch   Smoking cessation education              VTE Pharmacologic Prophylaxis:   Moderate Risk (Score 3-4) - Pharmacological DVT Prophylaxis Ordered: enoxaparin (Lovenox).    Mobility:   Basic Mobility Inpatient Raw Score: 24  JH-HLM Goal: 8: Walk 250 feet or more  JH-HLM Achieved: 7: Walk 25 feet or more  HLM Goal achieved. Continue to encourage appropriate mobility.    Patient Centered Rounds: I performed bedside rounds with nursing staff today.   Discussions with Specialists or Other Care Team Provider:     Education and Discussions with Family / Patient: Patient declined call to .     Total Time Spent on Date of Encounter in care of patient:  mins. This time was spent on one or more of the following: performing physical exam; counseling and coordination of care; obtaining or reviewing history; documenting in the medical record; reviewing/ordering tests, medications or procedures; communicating with other healthcare professionals and discussing with patient's family/caregivers.    Current Length of Stay: 2 day(s)  Current Patient Status: Inpatient   Certification Statement: The patient will continue to require additional inpatient hospital stay due to PAD for agram today   Discharge Plan: Anticipate discharge in 24-48 hrs to home.    Code Status: Level 1 - Full Code    Subjective:   Patient complaining of right toe pain. Also complaining of pain from insulin and lovenox injection site. No N/V no CP or  SOB. No fevers, chills. For agram today. Continues to refuse going home on insulin despite education.     Objective:     Vitals:   Temp (24hrs), Av.2 °F (36.2 °C), Min:96.9 °F (36.1 °C), Max:97.4 °F (36.3 °C)    Temp:  [96.9 °F (36.1 °C)-97.4 °F (36.3 °C)] 97.4 °F (36.3 °C)  HR:  [81-94] 87  Resp:  [18-23] 20  BP: (136-185)/(80-97) 136/97  SpO2:  [94 %-96 %] 95 %  Body mass index is 48.74 kg/m².     Input and Output Summary (last 24 hours):   No intake or output data in the 24 hours ending 24 0932    Physical Exam:   Physical Exam  Vitals and nursing note reviewed.   Constitutional:       General: She is not in acute distress.     Appearance: She is obese.   Cardiovascular:      Rate and Rhythm: Normal rate and regular rhythm.   Pulmonary:      Effort: Pulmonary effort is normal.      Breath sounds: Normal breath sounds.   Abdominal:      General: Bowel sounds are normal.      Palpations: Abdomen is soft.   Skin:     Comments: Dusky blue right great toe with surrounding foot erythema    Neurological:      Mental Status: She is alert. Mental status is at baseline.   Psychiatric:         Mood and Affect: Mood normal.          Additional Data:     Labs:  Results from last 7 days   Lab Units 24  0449   WBC Thousand/uL 10.77*   HEMOGLOBIN g/dL 12.6   HEMATOCRIT % 40.2   PLATELETS Thousands/uL 442*   NEUTROS PCT % 59   LYMPHS PCT % 30   MONOS PCT % 7   EOS PCT % 2     Results from last 7 days   Lab Units 24  0449   SODIUM mmol/L 138   POTASSIUM mmol/L 3.9   CHLORIDE mmol/L 105   CO2 mmol/L 23   BUN mg/dL 10   CREATININE mg/dL 0.53*   ANION GAP mmol/L 10   CALCIUM mg/dL 9.0   ALBUMIN g/dL 3.7   TOTAL BILIRUBIN mg/dL 0.29   ALK PHOS U/L 80   ALT U/L 9   AST U/L 8*   GLUCOSE RANDOM mg/dL 193*     Results from last 7 days   Lab Units 24  0453   INR  1.13     Results from last 7 days   Lab Units 24  0721 24  2107 24  1740 24  1601 24  1106 24  0842   POC GLUCOSE  mg/dl 140 118 107 79 259* 200*     Results from last 7 days   Lab Units 24  1006   HEMOGLOBIN A1C % 12.3*           Lines/Drains:  Invasive Devices       Peripheral Intravenous Line  Duration             Peripheral IV 24 Left Antecubital 1 day                      Telemetry:  Telemetry Orders (From admission, onward)               24 Hour Telemetry Monitoring  Continuous x 24 Hours (Telem)           Question:  Reason for 24 Hour Telemetry  Answer:  Arrhythmias requiring acute medical intervention / PPM or ICD malfunction                              Imaging: Reviewed radiology reports from this admission including: ECHO    Recent Cultures (last 7 days):         Last 24 Hours Medication List:   Current Facility-Administered Medications   Medication Dose Route Frequency Provider Last Rate    acetaminophen  975 mg Oral Q8H PRN Hector Stevens,       amLODIPine  5 mg Oral Daily Linwood Foley DO      aspirin  81 mg Oral Daily Maira Reyes PA-C      atorvastatin  40 mg Oral Daily With Dinner Kelly Martin PA-C      clopidogrel  75 mg Oral Daily Maira Reyes PA-C      enoxaparin  40 mg Subcutaneous Q12H OMID Pastor Sandra MD      HYDROmorphone  0.5 mg Intravenous Q6H PRN Keanu Olguin PA-C      insulin glargine  8 Units Subcutaneous QAM Kelly Martin PA-C      insulin lispro  2-12 Units Subcutaneous TID AC Kelly Martin PA-C      insulin lispro  2-12 Units Subcutaneous HS Kelly Martin PA-C      insulin lispro  5 Units Subcutaneous TID With Meals Kelly Martin PA-C      naloxone  0.04 mg Intravenous Q1MIN PRN Keanu Olguin PA-C      nicotine  1 patch Transdermal Daily Pastor Sandra MD      ondansetron  4 mg Intravenous Q6H PRN Keanu Olguin PA-C      oxyCODONE  2.5 mg Oral Q6H PRN Keanu Olguin PA-C      Or    oxyCODONE  5 mg Oral Q6H PRN Keanu Olguin PA-C      polyethylene glycol  17 g Oral Daily PRN Keanu Olguin PA-C          Today, Patient Was Seen By: Kelly  NICK Martin    **Please Note: This note may have been constructed using a voice recognition system.**

## 2024-02-21 NOTE — ASSESSMENT & PLAN NOTE
Newly diagnosed on this admission  Echo showed EF 65% with no evidence of intracardiac thrombus  Right great toe ischemia and pain likely in the setting of atherosclerotic plaque embolism from distal infrarenal aorta/SFA.  S/p RLE angiogram on 2/21  S/p right embolectomy/thrombectomy    -5L crystalloid in OR   -ebl 200ml   -uop 350ml  -start heparin gtt in 4hrs. Oozing from Left groin  Continue ASA, plavix and lipitor for medical management of PAD   Cardiology following to rule out cardioembolic source; recommendations appreciated  Encourage smoking cessation

## 2024-02-21 NOTE — BRIEF OP NOTE (RAD/CATH)
INTERVENTIONAL RADIOLOGY PROCEDURE NOTE    Date: 2/21/2024    Procedure:   Procedure Summary       Date:  Room / Location:     Anesthesia Start:  Anesthesia Stop:     Procedure:  Diagnosis:     Scheduled Providers:  Responsible Provider:     Anesthesia Type: Not recorded ASA Status: Not recorded            Preoperative diagnosis:   1. Great toe pain, right    2. PAD (peripheral artery disease) ([facility])    3. Smoker    4. Type 2 diabetes mellitus, without long-term current use of insulin ([facility])    5. HTN (hypertension)    6. Acute ischemia of Right great toe         Postoperative diagnosis: Same.    Surgeon: Serge Traylor MD     Assistant: None. No qualified resident was available.    Blood loss: 100 mL    Specimens: None     Findings:   Retrograde left common femoral artery access under US-guidance.    Selective R CFA arteriogram with runoff demonstrated moderate stenosis at the proximal SFA secondary to soft plaque as seen on prior CTA, otherwise patent femoropoliteal artery, and acute thromboembolic tibial disease superimposed on chronic tibial disease.  There was subocclusive acute thrombus within the proximal GURINDER with chronic occlusion of the distal GURINDER.  There was bulky acute thromboembolus within the peroneal artery with chronic occlusion of the distal peroneal artery.  The posterior tibial artery was occluded and its origin could not be identified. There were no named vessels in the distal leg or at the level of the ankle, with reconstitution of the plantar artery via collaterals in the foot.    Findings were discussed intraoperatively with Dr. Linwood Hayes of Vascular Surgery; plan was to proceed with attempt at SFA and tibial mechanical thrombectomy to clear as much acute thrombus as possible, prior to overnight thrombolysis.    A brief attempt was made at SFA thrombectomy with a CAT RX device, which was undersized for this lesion and ultimately ineffective; there was no significant debulking of  this lesion and no distal embolization.    Selective popliteal arteriogram demonstrated interval development of complete thrombosis of the tibial arteries extending proximally to the popliteal artery P2 segment.    Successful crossing into the previously patent distal segments of the GURINDER and peroneal arteries was achieved and numerous thrombectomy passes were attempted with the CAT RX device, yielding significant quantities of acute thrombus; however, in the absence of arterial outflow, immediate rethrombosis developed.    A 135 cm long 20 cm infusion length lysis catheter was placed with tip in the peroneal artery and infusion segment crossing the GURINDER origin.  A bolus of 4mg tPA was administered with plan to begin overnight infusion.  The access sheath was sutured into place. A LUE basilic vein PICC was placed to provide vascular access.    On serial inspection of the right foot, there was blue/purple discoloration of the entire foot, markedly progressed from the hallux and distal 2nd, 3rd, and 5th digits as observed in pre-op.  Dr. Hayes was called into the room for discussion.    Given worsening examination and progressive thrombosis, decision was made to attempt emergent operative intervention rather than overnight lysis.      She was transferred from IR to OR - please refer to later Vascular Surgery documentation for full details.    Complications: Progressive thromboembolism refractory to endovascular thrombectomy and thrombolysis.    Anesthesia: general

## 2024-02-21 NOTE — PROCEDURES
PICC Line Insertion    Date/Time: 2/21/2024 3:47 PM    Performed by: Serge Traylor MD  Authorized by: Serge Traylor MD    Patient location:  IR  Other Assisting Provider: No    Consent:     Consent obtained:  Written    Consent given by:  Patient  Universal protocol:     Procedure explained and questions answered to patient or proxy's satisfaction: yes      Relevant documents present and verified: yes      Test results available and properly labeled: yes      Radiology Images displayed and confirmed.  If images not available, report reviewed: yes      Required blood products, implants, devices, and special equipment available: yes      Site/side marked: yes      Immediately prior to procedure, a time out was called: yes      Patient identity confirmed:  Verbally with patient, arm band and hospital-assigned identification number  Pre-procedure details:     Hand hygiene: Hand hygiene performed prior to insertion      Sterile barrier technique: All elements of maximal sterile technique followed      Skin preparation:  ChloraPrep    Skin preparation agent: Skin preparation agent completely dried prior to procedure    Indications:     PICC line indications: new site    Anesthesia (see MAR for exact dosages):     Anesthesia method:  Local infiltration    Local anesthetic:  Lidocaine 1% w/o epi  Procedure details:     Location:  Basilic    Vessel type: vein      Laterality:  Left    Approach: percutaneous technique used      Patient position:  Flat    Catheter size:  4.5 Fr    Landmarks identified: yes      Ultrasound guidance: yes      Ultrasound image availability:  Still images obtained    Sterile ultrasound techniques: Sterile gel and sterile probe covers were used      Number of attempts:  1    Successful placement: yes      Total catheter length (cm):  51cm    Catheter out on skin (cm):  0  Post-procedure details:     Post-procedure:  Dressing applied and securement device placed    Assessment:   Blood return through all ports and free fluid flow    Patient tolerance of procedure:  Tolerated well, no immediate complications

## 2024-02-21 NOTE — ASSESSMENT & PLAN NOTE
Noting prominent left ovary Nonemergent pelvic ultrasound can be obtained if clinically warranted   Incidental note completed  I discussed this with patient and need for outpt follow up - she demonstrated understanding

## 2024-02-21 NOTE — ANESTHESIA PROCEDURE NOTES
Arterial Line Insertion    Performed by: Ha Strauss MD  Authorized by: Ha Strauss MD  Consent: The procedure was performed in an emergent situation.  Preparation: Patient was prepped and draped in the usual sterile fashion.  Indications: multiple ABGs and hemodynamic monitoring  Orientation:  Right  Location: radial arteryMedication group details: ga.  Procedure Details:  Scot's test normal: yes  Needle gauge: 20  Seldinger technique: Seldinger technique used  Number of attempts: 1    Post-procedure:  Post-procedure: dressing applied  Waveform: good waveform  Post-procedure CNS: unchanged  Patient tolerance: patient tolerated the procedure well with no immediate complications

## 2024-02-21 NOTE — ASSESSMENT & PLAN NOTE
60 yo female smoker w/ no past medical history presented to [facility] on 2/19/24 w/ R toe pain. Pt was diagnosed w/ distal embolism to R toes likely secondary to atherosclerotic plaque. Pt was seen in consult by cardiology. Echo showed normal EF w/ no evidence of intracardiac thrombus. During her hospital stay, she is newly diagnosed w/ HTN, dyslipidemia, obesity, DM II and severe PAD.    Vascular surgery consulted for R great toe pain. LEAD showed tibioperoneal disease w/ occlusions vs high grade stenoses in the R AT, PT and peroneal arteries, likely secondary to atheroemboli from distal aorta or SFA. CTA abdomen w/ B/L runoff showed noncalcific atherosclerotic plaque involving the distal infrarenal aorta w/ aortic luminal narrowing and R SFA disease. Plan for RLE angiogram in IR today (2/21).     Diagnostics:  -2/19/24 R great toe xray: Mild soft tissue swelling overlying the first toe with no acute osseous abnormality.   -2/19/24 LEAD: Right: Evidence of diffuse arterial occlusive disease throughout the femoropopliteal segments without focal stenosis. Evidence of tibioperoneal disease with occlusions vs high grade stenoses in the anterior tibial, posterior tibial and peroneal arteries. R EVELIA: 1.39/-/- (poorly compressible vessels). Left: L EVELIA: 1.32/220/110.  -2/19/24 CTA abdomen w/ runoff: Large volume of noncalcific atherosclerotic plaque involving the distal infrarenal aorta with aortic luminal narrowing. R SFA disease related to noncalcific plaque. The tibial runoff is poorly evaluated given venous contamination; however, the tibial vessels do appear intact with faint filling of the plantar artery. The dorsalis pedis is not well visualized. No significant left lower extremity arterial disease. Splenic artery occlusion, likely chronic. No evidence of splenic infarct. There is decreased arterial mottling suggestive of decreased perfusion.    Plan:  -R great toe ischemia and pain without sensory/motor  deficits  -Likely secondary to atherosclerotic plaque embolism from distal infrarenal aorta/SFA   -LEAD showed tibioperoneal disease w/ occlusions vs high grade stenoses in the R AT, PT and peroneal arteries; R EVELIA: 1.39/-/-  -Plan for RLE angiogram w/ possible intervention in IR today (2/21)  -Keep pt NPO for IR today  -Continue ASA, plavix and lipitor for medical management of PAD  -Cardiology following to rule out cardioembolic source; input appreciated  -Smoking cessation  -Will discuss w/ Dr. Hayes

## 2024-02-21 NOTE — ANESTHESIA PREPROCEDURE EVALUATION
Procedure:  IR LOWER EXTREMITY ANGIOGRAM    Relevant Problems   ANESTHESIA (within normal limits)      CARDIO   (+) HTN (hypertension)   (+) PAD (peripheral artery disease) (HCC)      ENDO   (+) Type 2 diabetes mellitus, without long-term current use of insulin (HCC)      GI/HEPATIC (within normal limits)      Other   (+) Morbid obesity (HCC)   (+) Tobacco abuse        Physical Exam    Airway    Mallampati score: II  TM Distance: >3 FB  Neck ROM: full     Dental        Cardiovascular  Cardiovascular exam normal    Pulmonary  Pulmonary exam normal     Other Findings  post-pubertal.      Anesthesia Plan  ASA Score- 3     Anesthesia Type- general with ASA Monitors.         Additional Monitors:     Airway Plan: LMA.           Plan Factors-    Chart reviewed.   Existing labs reviewed. Patient summary reviewed.    Patient is a current smoker.  Patient instructed to abstain from smoking on day of procedure. Patient did not smoke on day of surgery.    Obstructive sleep apnea risk education given perioperatively.        Induction- intravenous.    Postoperative Plan-     Informed Consent- Anesthetic plan and risks discussed with patient.

## 2024-02-21 NOTE — ASSESSMENT & PLAN NOTE
Lab Results   Component Value Date    HGBA1C 12.3 (H) 02/19/2024       Recent Labs     02/20/24  1740 02/20/24  2107 02/21/24  0721 02/21/24  1104   POCGLU 107 118 140 133       Blood Sugar Average: Last 72 hrs:  (P) 148    Newly diagnosed during this admission  Patient is refusing starting insulin regimen upon discharge despite reviewing risk and benefits of chronic management.    Lantus 18 units Humalog 5 units  SSI, Accu-Cheks ACHS  Consider starting patient on oral antiglycemic agents if patient continues to refuse insulin upon discharge  Outpatient follow-up with ophthalmology

## 2024-02-21 NOTE — ASSESSMENT & PLAN NOTE
CT abdomen 2/19/2024 showed incidental prominent left ovary. Nonemergent pelvic ultrasound can be obtained if clinically warranted. Nonspecific bilateral inguinal adenopathy, slightly more pronounced on the right. Largest measures 1.2 cm short axis on the right.      Follow up in 1 week with PCP

## 2024-02-21 NOTE — CONSULTS
Formerly [facility]  Consult  Name: Georgie King 59 y.o. female I MRN: [id number]  Unit/Bed#: [place] [address] -01 I Date of Admission: 2/19/2024   Date of Service: 2/21/2024 I Hospital Day: 2    Consults    Assessment/Plan   * Acute ischemia of Right great toe  Assessment & Plan  Presented to [facility] on 2/19 due to worsening right great toe pain for the past 2 weeks with associated discoloration.  Patient denies fever or chills.  Podiatry evaluation recommended vascular surgery intervention  Leukocytosis of 12.23.  Thrombocytosis 472.   X-ray great toe 2/19/2024: Mild soft tissue swelling overlying the first toe with no acute osseous abnormality.  LEAD 2/19/2024: Right lower limb: Diffuse arterial occlusive disease throughout femoral-popliteal segments without focal stenosis. Evidence of tibial peritoneal disease with occlusion versus high-grade stenosis in the anterior tibial, posterior tibial, and peroneal arteries. EVELIA of 1.39, however may not be reliable secondary to poor compressible vessels. EVELIA left lower limb: 1.32. Of note, multiple echogenic structures located in the right inguinal region suggestive of enlarged lymphatic channels.  CTA abdomen 2/19/24: noncalcific atherosclerotic plaque involving the distal infrarenal aorta with aortic luminal narrowing. Right superficial femoral artery disease related to nonclcific plaque. Poor evaluation of tibial runoff given venous contamination, however  the tibial vessels do appear intact with faint filling of the plantar artery. No significant left lower extremity arterial disease. Splenic artery occlusion, likely chronic. No evidence of splenic infarct. There is decreased arterial mottling suggestive of decreased perfusion.  Likely secondary to atherosclerotic plaque embolism from distal infrarenal aorta/SFA      RLE angiogram today  See A/P PAD    PAD (peripheral artery disease) (HCC)  Assessment & Plan  Newly diagnosed on this admission  Echo showed  EF 65% with no evidence of intracardiac thrombus  Right great toe ischemia and pain likely in the setting of atherosclerotic plaque embolism from distal infrarenal aorta/SFA.  S/p RLE angiogram on 2/21    Continue ASA, plavix and lipitor for medical management of PAD   Cardiology following to rule out cardioembolic source; recommendations appreciated  Encourage smoking cessation    Type 2 diabetes mellitus, without long-term current use of insulin (HCC)  Assessment & Plan  Lab Results   Component Value Date    HGBA1C 12.3 (H) 02/19/2024       Recent Labs     02/20/24  1740 02/20/24  2107 02/21/24  0721 02/21/24  1104   POCGLU 107 118 140 133       Blood Sugar Average: Last 72 hrs:  (P) 148    Newly diagnosed during this admission  Patient is refusing starting insulin regimen upon discharge despite reviewing risk and benefits of chronic management.    Lantus 18 units Humalog 5 units  SSI, Accu-Cheks ACHS  Consider starting patient on oral antiglycemic agents if patient continues to refuse insulin upon discharge  Outpatient follow-up with ophthalmology      Dyslipidemia  Assessment & Plan  Newly diagnosed during this admission. Xanthelasmas bilateral eyelids   Cholesterol 162  Triglycerides 204  HDL 43  LDL 78    Atorvastatin 40 mg daily    HTN (hypertension)  Assessment & Plan  Newly diagnosed during this admission  Blood Pressure: 153/82     Norvasc 5 mg daily  Consider starting ACE; evaluate s/p angiogram  Continuous cardiopulmonary monitoring    Morbid obesity (HCC)  Assessment & Plan  Body mass index is 48.74 kg/m².    Consider nutritionist consult  Encourage weight loss    Abnormal CT scan  Assessment & Plan  CT abdomen 2/19/2024 showed incidental prominent left ovary. Nonemergent pelvic ultrasound can be obtained if clinically warranted. Nonspecific bilateral inguinal adenopathy, slightly more pronounced on the right. Largest measures 1.2 cm short axis on the right.      Follow up in 1 week with PCP    Tobacco  abuse  Assessment & Plan  0.5 PPD    Nicotine replacement therapy  Smoking cessation education         ----------------------------------------------------------------------------------------  HPI/24hr events: ***    {Patient appropriate for transfer out of the ICU today?:89277}  Disposition: { Dispo Plan:61502}  Code Status: Level 1 - Full Code  ---------------------------------------------------------------------------------------  SUBJECTIVE  ***    Review of Systems  {Critical Care ROS Daily Progress Note  (Optional):34139}  ---------------------------------------------------------------------------------------  OBJECTIVE    Vitals   Vitals:    24 2241 24 0723 24 1105   BP:  156/81 136/97 153/82   BP Location:  Right arm     Patient Position:       Cuff Size:       Pulse:  81 87 79   Resp:  18 20 20   Temp:  (!) 96.9 °F (36.1 °C) (!) 97.4 °F (36.3 °C) (!) 97.1 °F (36.2 °C)   TempSrc:  Oral     SpO2: 96% 95% 95% 91%   Weight:       Height:         Temp (24hrs), Av.1 °F (36.2 °C), Min:96.9 °F (36.1 °C), Max:97.4 °F (36.3 °C)  Current: Temperature: (!) 97.1 °F (36.2 °C)          Respiratory:  {SL IP ICU Oxygen Therapy:50727}       Invasive/non-invasive ventilation settings   Respiratory      Lab Data (Last 4 hours)      None           O2/Vent Data (Last 4 hours)      None                    Physical Exam        Laboratory and Diagnostics:  Results from last 7 days   Lab Units 24  0449 24  1006   WBC Thousand/uL 10.77* 12.23*   HEMOGLOBIN g/dL 12.6 13.3   HEMATOCRIT % 40.2 42.6   PLATELETS Thousands/uL 442* 472*   NEUTROS PCT % 59 69   MONOS PCT % 7 6   EOS PCT % 2 1     Results from last 7 days   Lab Units 24  0449 24  1155   SODIUM mmol/L 138 138   POTASSIUM mmol/L 3.9 3.9   CHLORIDE mmol/L 105 102   CO2 mmol/L 23 27   ANION GAP mmol/L 10 9   BUN mg/dL 10 10   CREATININE mg/dL 0.53* 0.62   CALCIUM mg/dL 9.0 9.4   GLUCOSE RANDOM mg/dL 193* 223*   ALT U/L  "9 11   AST U/L 8* 9*   ALK PHOS U/L 80 102   ALBUMIN g/dL 3.7 4.1   TOTAL BILIRUBIN mg/dL 0.29 0.25     Results from last 7 days   Lab Units 02/20/24  0449   MAGNESIUM mg/dL 2.0      Results from last 7 days   Lab Units 02/21/24  0453   INR  1.13              ABG:    VBG:          Micro        EKG: ***  Imaging: {Results Review Statement:98190}    Intake and Output  I/O         02/19 0701 02/20 0700 02/20 0701 02/21 0700 02/21 0701 02/22 0700    I.V. (mL/kg)   400 (3.4)    Total Intake(mL/kg)   400 (3.4)    Net   +400                   Height and Weights   Height: 5' 1\" (154.9 cm)  IBW (Ideal Body Weight): 47.8 kg  Body mass index is 48.74 kg/m².  Weight (last 2 days)       Date/Time Weight    02/20/24 09:38:59 117 (257.94)    02/19/24 0942 117 (257.94)              Nutrition       Diet Orders   (From admission, onward)                 Start     Ordered    02/21/24 0001  Diet NPO; Sips with meds  Diet effective midnight        Comments: For IR procedure   References:    Adult Nutrition Support Algorithm    RD Therapeutic Diet Order Protocol   Question Answer Comment   Diet Type NPO    NPO Except: Sips with meds    RD to adjust diet per protocol? Yes        02/20/24 1536                      Active Medications  Scheduled Meds:  Current Facility-Administered Medications   Medication Dose Route Frequency Provider Last Rate   • acetaminophen  975 mg Oral Q8H PRN Hector Stevens DO     • alteplase (CATHFLO) 10 mg in sodium chloride 0.9 % 250 mL infusion  0.5 mg/hr Intracatheter Continuous KARY Avila     • alteplase    PRN Serge Traylor MD     • amLODIPine  5 mg Oral Daily Linwood Foley DO     • aspirin  81 mg Oral Daily Maira Reyes PA-C     • atorvastatin  40 mg Oral Daily With Dinner Kelly Martin PA-C     • clopidogrel  75 mg Oral Daily Maira Reyes PA-C     • HYDROmorphone  0.5 mg Intravenous Q6H PRN Keanu Olguin PA-C     • [START ON 2/22/2024] insulin glargine  18 " Units Subcutaneous QAM Kelly Martin PA-C     • insulin lispro  2-12 Units Subcutaneous TID AC Kelly Martin PA-C     • insulin lispro  2-12 Units Subcutaneous HS Kelly Martin PA-C     • insulin lispro  5 Units Subcutaneous TID With Meals Kelly Martin PA-C     • lidocaine 1% buffered   Infiltration PRN Serge Traylor MD     • naloxone  0.04 mg Intravenous Q1MIN PRN Keanu Olguin PA-C     • nicotine  1 patch Transdermal Daily Pastor Sandra MD     • ondansetron  4 mg Intravenous Q6H PRN Keanu Olguin PA-C     • oxyCODONE  2.5 mg Oral Q6H PRN Keanu Olguin PA-C      Or   • oxyCODONE  5 mg Oral Q6H PRN Keanu Olguin PA-C     • polyethylene glycol  17 g Oral Daily PRN Keanu Olguin PA-C       Facility-Administered Medications Ordered in Other Encounters   Medication Dose Route Frequency Provider Last Rate   • fentanyl citrate (PF)   Intravenous PRN Ha Strauss MD     • heparin (porcine)   Intravenous PRN Ha Strauss MD     • lidocaine (PF)   Intravenous PRN Ha Strauss MD     • midazolam   Intravenous PRN Ha Strauss MD     • ondansetron   Intravenous PRN Ha Strauss MD     • phenylephrine   Intravenous PRN Ha Strauss MD     • propofol   Intravenous PRN Ha Strauss MD     • ROCuronium   Intravenous PRN Ha Strauss MD     • sodium chloride   Intravenous Continuous PRN Ha Strauss MD     • Succinylcholine Chloride   Intravenous PRN Ha Strauss MD       Continuous Infusions:  alteplase (CATHFLO) 10 mg in sodium chloride 0.9 % 250 mL infusion, 0.5 mg/hr      PRN Meds:   acetaminophen, 975 mg, Q8H PRN  alteplase, , PRN  HYDROmorphone, 0.5 mg, Q6H PRN  lidocaine 1% buffered, , PRN  naloxone, 0.04 mg, Q1MIN PRN  ondansetron, 4 mg, Q6H PRN  oxyCODONE, 2.5 mg, Q6H PRN   Or  oxyCODONE, 5 mg, Q6H PRN  polyethylene glycol, 17 g, Daily PRN        Invasive Devices Review  Invasive Devices       Peripheral Intravenous Line  Duration             Peripheral IV 02/21/24 Right;Ventral (anterior) Forearm <1 day          "     Line  Duration             Arterial Sheath 6 Fr. Left Femoral <1 day              Airway  Duration             ETT  Cuffed;Oral;Inflated 7 mm <1 day                  ---------------------------------------------------------------------------------------  Advance Directive and Living Will:      Power of :    POLST:    ---------------------------------------------------------------------------------------  Care Time Delivered:   {Critical Care Time Delivered:74338}      Thelma Hobbs MD      Portions of the record may have been created with voice recognition software.  Occasional wrong word or \"sound a like\" substitutions may have occurred due to the inherent limitations of voice recognition software.  Read the chart carefully and recognize, using context, where substitutions have occurred      "

## 2024-02-21 NOTE — DISCHARGE INSTRUCTIONS
ARTERIOGRAM    WHAT YOU SHOULD KNOW:   An angiogram is a procedure to look at arteries in your body. Arteries are the blood vessels that carry blood from your heart to your body.     AFTER YOU LEAVE:     Self-care:   Limit activity: Rest for the remainder of the day of your procedure.Have some one with you until the next morning. Keep your arm or leg straight as much as possible.Rest as much as possible, sitting lying or reclining. Walk only to go to the bathroom, to bed or to eat. If the angiogram catheter was put in your leg, use the stairs as little as possible. No driving for 24-48 hours. No heavy lifting, >10 lbs. Or strenuous activity for 48 hours.    Keep your wound clean and dry.  Remove band aid/ dressing tomorrow. You may shower 24 hours after your procedure. Shower and wash groin area or wrist area gently with soap and water: beginning tomorrow. Rinse and pat Dry. Apply new water seal band aid. Repeat this process for 5 days.  If there is any drainage from the puncture site, you should put on a clean bandage. No Powders, creams, lotions or antibiotic ointments for 5 days.  No tub baths, hot tubs or swimming for 5 days.    Watch for bleeding and bruising: It is normal to have a bruise and soreness where the angiogram catheter went in.  Medication: If your angiogram was performed to treat blockages in your leg arteries, it is strongly recommended that you take both an antiplatelet medication (like aspirin or Plavix) to prevent clotting AND a statin drug (like Lipitor or Crestor), even if you have normal cholesterol. If these drugs are not ordered for you please contact either your Vascular Surgery office or the Interventional Radiology Dept during normal daytime working hours. See Interventional Radiology telephone numbers below.  You Should Have Follow up with the vascular surgeon   call [phone] with questions  Diet:   You may resume your regular diet, Sips of flat soda will help with mild  nausea.  Drink more liquids than usual for the next 24 hours        IMMEDIATELY Contact Interventional Radiology at [phone] if any of the following occur:  If your bruise gets larger or if you notice any active bleeding. APPLY DIRECT PRESSURE TO THE BLEEDING SITE.   If you notice increased swelling or have increased pain at the puncture site   If you have any numbness or pain in the extremity of the puncture site   If that extremity seems cold or pale.    You have fever greater than 101  Persistent nausea or vomitting    Follow up with your primary healthcare provider  as directed: Write down your questions so you remember to ask them during your visits.

## 2024-02-21 NOTE — ASSESSMENT & PLAN NOTE
Newly diagnosed during this admission  Blood Pressure: 153/82     Norvasc 5 mg daily  Continuous cardiopulmonary monitoring

## 2024-02-21 NOTE — PLAN OF CARE
Problem: PAIN - ADULT  Goal: Verbalizes/displays adequate comfort level or baseline comfort level  Description: Interventions:  - Encourage patient to monitor pain and request assistance  - Assess pain using appropriate pain scale  - Administer analgesics based on type and severity of pain and evaluate response  - Implement non-pharmacological measures as appropriate and evaluate response  - Consider cultural and social influences on pain and pain management  - Notify physician/advanced practitioner if interventions unsuccessful or patient reports new pain  Outcome: Progressing     Problem: SAFETY ADULT  Goal: Patient will remain free of falls  Description: INTERVENTIONS:  - Educate patient/family on patient safety including physical limitations  - Instruct patient to call for assistance with activity   - Consult OT/PT to assist with strengthening/mobility   - Keep Call bell within reach  - Keep bed low and locked with side rails adjusted as appropriate  - Keep care items and personal belongings within reach  - Initiate and maintain comfort rounds  - Make Fall Risk Sign visible to staff  - Offer Toileting every 2 Hours, in advance of need  - Initiate/Maintain bed alarm  - Obtain necessary fall risk management equipment: bed alarm  - Apply yellow socks and bracelet for high fall risk patients  - Consider moving patient to room near nurses station  Outcome: Progressing  Goal: Maintain or return to baseline ADL function  Description: INTERVENTIONS:  -  Assess patient's ability to carry out ADLs; assess patient's baseline for ADL function and identify physical deficits which impact ability to perform ADLs (bathing, care of mouth/teeth, toileting, grooming, dressing, etc.)  - Assess/evaluate cause of self-care deficits   - Assess range of motion  - Assess patient's mobility; develop plan if impaired  - Assess patient's need for assistive devices and provide as appropriate  - Encourage maximum independence but intervene  and supervise when necessary  - Involve family in performance of ADLs  - Assess for home care needs following discharge   - Consider OT consult to assist with ADL evaluation and planning for discharge  - Provide patient education as appropriate  Outcome: Progressing  Goal: Maintains/Returns to pre admission functional level  Description: INTERVENTIONS:  - Perform AM-PAC 6 Click Basic Mobility/ Daily Activity assessment daily.  - Set and communicate daily mobility goal to care team and patient/family/caregiver.   - Collaborate with rehabilitation services on mobility goals if consulted  - Perform Range of Motion 4 times a day.  - Reposition patient every 2 hours.  - Dangle patient 3 times a day  - Stand patient 3 times a day  - Ambulate patient 3 times a day  - Out of bed to chair 3 times a day   - Out of bed for meals 3 times a day  - Out of bed for toileting  - Record patient progress and toleration of activity level   Outcome: Progressing     Problem: DISCHARGE PLANNING  Goal: Discharge to home or other facility with appropriate resources  Description: INTERVENTIONS:  - Identify barriers to discharge w/patient and caregiver  - Arrange for needed discharge resources and transportation as appropriate  - Identify discharge learning needs (meds, wound care, etc.)  - Arrange for interpretive services to assist at discharge as needed  - Refer to Case Management Department for coordinating discharge planning if the patient needs post-hospital services based on physician/advanced practitioner order or complex needs related to functional status, cognitive ability, or social support system  Outcome: Progressing     Problem: Knowledge Deficit  Goal: Patient/family/caregiver demonstrates understanding of disease process, treatment plan, medications, and discharge instructions  Description: Complete learning assessment and assess knowledge base.  Interventions:  - Provide teaching at level of understanding  - Provide teaching  via preferred learning methods  Outcome: Progressing     Problem: CARDIOVASCULAR - ADULT  Goal: Absence of cardiac dysrhythmias or at baseline rhythm  Description: INTERVENTIONS:  - Continuous cardiac monitoring, vital signs, obtain 12 lead EKG if ordered  - Administer antiarrhythmic and heart rate control medications as ordered  - Monitor electrolytes and administer replacement therapy as ordered  Outcome: Progressing        Problem: MUSCULOSKELETAL - ADULT  Goal: Maintain or return mobility to safest level of function  Description: INTERVENTIONS:  - Assess patient's ability to carry out ADLs; assess patient's baseline for ADL function and identify physical deficits which impact ability to perform ADLs (bathing, care of mouth/teeth, toileting, grooming, dressing, etc.)  - Assess/evaluate cause of self-care deficits   - Assess range of motion  - Assess patient's mobility  - Assess patient's need for assistive devices and provide as appropriate  - Encourage maximum independence but intervene and supervise when necessary  - Involve family in performance of ADLs  - Assess for home care needs following discharge   - Consider OT consult to assist with ADL evaluation and planning for discharge  - Provide patient education as appropriate  Outcome: Progressing  Goal: Maintain proper alignment of affected body part  Description: INTERVENTIONS:  - Support, maintain and protect limb and body alignment  - Provide patient/ family with appropriate education  Outcome: Progressing     Problem: METABOLIC, FLUID AND ELECTROLYTES - ADULT  Goal: Electrolytes maintained within normal limits  Description: INTERVENTIONS:  - Monitor labs and assess patient for signs and symptoms of electrolyte imbalances  - Administer electrolyte replacement as ordered  - Monitor response to electrolyte replacements, including repeat lab results as appropriate  - Instruct patient on fluid and nutrition as appropriate  Outcome: Progressing  Goal: Fluid  balance maintained  Description: INTERVENTIONS:  - Monitor labs   - Monitor I/O and WT  - Instruct patient on fluid and nutrition as appropriate  - Assess for signs & symptoms of volume excess or deficit  Outcome: Progressing  Goal: Glucose maintained within target range  Description: INTERVENTIONS:  - Monitor Blood Glucose as ordered  - Assess for signs and symptoms of hyperglycemia and hypoglycemia  - Administer ordered medications to maintain glucose within target range  - Assess nutritional intake and initiate nutrition service referral as needed  Outcome: Progressing

## 2024-02-22 LAB
ABO GROUP BLD: NORMAL
ALBUMIN SERPL BCP-MCNC: 3.1 G/DL (ref 3.5–5)
ALP SERPL-CCNC: 73 U/L (ref 34–104)
ALT SERPL W P-5'-P-CCNC: 9 U/L (ref 7–52)
ANION GAP SERPL CALCULATED.3IONS-SCNC: 4 MMOL/L
ANION GAP SERPL CALCULATED.3IONS-SCNC: 9 MMOL/L
APTT PPP: 134 SECONDS (ref 23–37)
APTT PPP: 67 SECONDS (ref 23–37)
APTT PPP: >210 SECONDS (ref 23–37)
AST SERPL W P-5'-P-CCNC: 10 U/L (ref 13–39)
BASE EXCESS BLDA CALC-SCNC: -4 MMOL/L (ref -2–3)
BASOPHILS # BLD AUTO: 0.06 THOUSANDS/ÂΜL (ref 0–0.1)
BASOPHILS NFR BLD AUTO: 0 % (ref 0–1)
BILIRUB SERPL-MCNC: 0.32 MG/DL (ref 0.2–1)
BLD GP AB SCN SERPL QL: NEGATIVE
BUN SERPL-MCNC: 8 MG/DL (ref 5–25)
BUN SERPL-MCNC: 9 MG/DL (ref 5–25)
CA-I BLD-SCNC: 1.1 MMOL/L (ref 1.12–1.32)
CALCIUM ALBUM COR SERPL-MCNC: 8.1 MG/DL (ref 8.3–10.1)
CALCIUM SERPL-MCNC: 7.2 MG/DL (ref 8.4–10.2)
CALCIUM SERPL-MCNC: 7.4 MG/DL (ref 8.4–10.2)
CHLORIDE SERPL-SCNC: 110 MMOL/L (ref 96–108)
CHLORIDE SERPL-SCNC: 111 MMOL/L (ref 96–108)
CO2 SERPL-SCNC: 20 MMOL/L (ref 21–32)
CO2 SERPL-SCNC: 23 MMOL/L (ref 21–32)
CREAT SERPL-MCNC: 0.55 MG/DL (ref 0.6–1.3)
CREAT SERPL-MCNC: 0.58 MG/DL (ref 0.6–1.3)
EOSINOPHIL # BLD AUTO: 0.14 THOUSAND/ÂΜL (ref 0–0.61)
EOSINOPHIL NFR BLD AUTO: 1 % (ref 0–6)
ERYTHROCYTE [DISTWIDTH] IN BLOOD BY AUTOMATED COUNT: 13.6 % (ref 11.6–15.1)
GFR SERPL CREATININE-BSD FRML MDRD: 100 ML/MIN/1.73SQ M
GFR SERPL CREATININE-BSD FRML MDRD: 102 ML/MIN/1.73SQ M
GLUCOSE SERPL-MCNC: 119 MG/DL (ref 65–140)
GLUCOSE SERPL-MCNC: 130 MG/DL (ref 65–140)
GLUCOSE SERPL-MCNC: 131 MG/DL (ref 65–140)
GLUCOSE SERPL-MCNC: 132 MG/DL (ref 65–140)
GLUCOSE SERPL-MCNC: 132 MG/DL (ref 65–140)
GLUCOSE SERPL-MCNC: 138 MG/DL (ref 65–140)
GLUCOSE SERPL-MCNC: 145 MG/DL (ref 65–140)
GLUCOSE SERPL-MCNC: 156 MG/DL (ref 65–140)
GLUCOSE SERPL-MCNC: 164 MG/DL (ref 65–140)
HCO3 BLDA-SCNC: 19.9 MMOL/L (ref 22–28)
HCT VFR BLD AUTO: 28.7 % (ref 34.8–46.1)
HCT VFR BLD CALC: 29 % (ref 34.8–46.1)
HGB BLD-MCNC: 9 G/DL (ref 11.5–15.4)
HGB BLD-MCNC: 9.1 G/DL (ref 11.5–15.4)
HGB BLDA-MCNC: 9.9 G/DL (ref 11.5–15.4)
IMM GRANULOCYTES # BLD AUTO: 0.06 THOUSAND/UL (ref 0–0.2)
IMM GRANULOCYTES NFR BLD AUTO: 0 % (ref 0–2)
INR PPP: 1.24 (ref 0.84–1.19)
KCT BLD-ACNC: 211 SEC (ref 89–137)
LYMPHOCYTES # BLD AUTO: 3.77 THOUSANDS/ÂΜL (ref 0.6–4.47)
LYMPHOCYTES NFR BLD AUTO: 26 % (ref 14–44)
MAGNESIUM SERPL-MCNC: 1.8 MG/DL (ref 1.9–2.7)
MCH RBC QN AUTO: 26.4 PG (ref 26.8–34.3)
MCHC RBC AUTO-ENTMCNC: 31.4 G/DL (ref 31.4–37.4)
MCV RBC AUTO: 84 FL (ref 82–98)
MONOCYTES # BLD AUTO: 0.67 THOUSAND/ÂΜL (ref 0.17–1.22)
MONOCYTES NFR BLD AUTO: 5 % (ref 4–12)
NEUTROPHILS # BLD AUTO: 9.74 THOUSANDS/ÂΜL (ref 1.85–7.62)
NEUTS SEG NFR BLD AUTO: 68 % (ref 43–75)
NRBC BLD AUTO-RTO: 0 /100 WBCS
PCO2 BLD: 21 MMOL/L (ref 21–32)
PCO2 BLD: 31 MM HG (ref 36–44)
PH BLD: 7.42 [PH] (ref 7.35–7.45)
PHOSPHATE SERPL-MCNC: 4.3 MG/DL (ref 2.3–4.1)
PLATELET # BLD AUTO: 318 THOUSANDS/UL (ref 149–390)
PMV BLD AUTO: 9.9 FL (ref 8.9–12.7)
PO2 BLD: 273 MM HG (ref 75–129)
POTASSIUM BLD-SCNC: 4.1 MMOL/L (ref 3.5–5.3)
POTASSIUM SERPL-SCNC: 3.5 MMOL/L (ref 3.5–5.3)
POTASSIUM SERPL-SCNC: 3.8 MMOL/L (ref 3.5–5.3)
PROT SERPL-MCNC: 5.9 G/DL (ref 6.4–8.4)
PROTHROMBIN TIME: 15.8 SECONDS (ref 11.6–14.5)
RBC # BLD AUTO: 3.41 MILLION/UL (ref 3.81–5.12)
RH BLD: POSITIVE
SAO2 % BLD FROM PO2: 100 % (ref 60–85)
SODIUM BLD-SCNC: 138 MMOL/L (ref 136–145)
SODIUM SERPL-SCNC: 138 MMOL/L (ref 135–147)
SODIUM SERPL-SCNC: 139 MMOL/L (ref 135–147)
SPECIMEN EXPIRATION DATE: NORMAL
SPECIMEN SOURCE: ABNORMAL
SPECIMEN SOURCE: ABNORMAL
WBC # BLD AUTO: 14.44 THOUSAND/UL (ref 4.31–10.16)

## 2024-02-22 PROCEDURE — 94003 VENT MGMT INPAT SUBQ DAY: CPT

## 2024-02-22 PROCEDURE — 84100 ASSAY OF PHOSPHORUS: CPT | Performed by: PHYSICIAN ASSISTANT

## 2024-02-22 PROCEDURE — 85018 HEMOGLOBIN: CPT

## 2024-02-22 PROCEDURE — 85730 THROMBOPLASTIN TIME PARTIAL: CPT | Performed by: STUDENT IN AN ORGANIZED HEALTH CARE EDUCATION/TRAINING PROGRAM

## 2024-02-22 PROCEDURE — 99232 SBSQ HOSP IP/OBS MODERATE 35: CPT | Performed by: INTERNAL MEDICINE

## 2024-02-22 PROCEDURE — 82948 REAGENT STRIP/BLOOD GLUCOSE: CPT

## 2024-02-22 PROCEDURE — 85730 THROMBOPLASTIN TIME PARTIAL: CPT

## 2024-02-22 PROCEDURE — 94760 N-INVAS EAR/PLS OXIMETRY 1: CPT

## 2024-02-22 PROCEDURE — 85730 THROMBOPLASTIN TIME PARTIAL: CPT | Performed by: PHYSICIAN ASSISTANT

## 2024-02-22 PROCEDURE — 85025 COMPLETE CBC W/AUTO DIFF WBC: CPT | Performed by: STUDENT IN AN ORGANIZED HEALTH CARE EDUCATION/TRAINING PROGRAM

## 2024-02-22 PROCEDURE — 85610 PROTHROMBIN TIME: CPT | Performed by: STUDENT IN AN ORGANIZED HEALTH CARE EDUCATION/TRAINING PROGRAM

## 2024-02-22 PROCEDURE — 80048 BASIC METABOLIC PNL TOTAL CA: CPT | Performed by: STUDENT IN AN ORGANIZED HEALTH CARE EDUCATION/TRAINING PROGRAM

## 2024-02-22 PROCEDURE — 99233 SBSQ HOSP IP/OBS HIGH 50: CPT | Performed by: SURGERY

## 2024-02-22 PROCEDURE — 83735 ASSAY OF MAGNESIUM: CPT | Performed by: PHYSICIAN ASSISTANT

## 2024-02-22 RX ORDER — POTASSIUM CHLORIDE 29.8 MG/ML
40 INJECTION INTRAVENOUS ONCE
Status: COMPLETED | OUTPATIENT
Start: 2024-02-22 | End: 2024-02-22

## 2024-02-22 RX ORDER — CALCIUM GLUCONATE 20 MG/ML
2 INJECTION, SOLUTION INTRAVENOUS ONCE
Status: COMPLETED | OUTPATIENT
Start: 2024-02-22 | End: 2024-02-22

## 2024-02-22 RX ORDER — INSULIN LISPRO 100 [IU]/ML
2-12 INJECTION, SOLUTION INTRAVENOUS; SUBCUTANEOUS EVERY 6 HOURS SCHEDULED
Status: DISCONTINUED | OUTPATIENT
Start: 2024-02-22 | End: 2024-02-23

## 2024-02-22 RX ORDER — MAGNESIUM SULFATE HEPTAHYDRATE 40 MG/ML
2 INJECTION, SOLUTION INTRAVENOUS ONCE
Status: COMPLETED | OUTPATIENT
Start: 2024-02-22 | End: 2024-02-22

## 2024-02-22 RX ADMIN — CEFAZOLIN SODIUM 1000 MG: 1 SOLUTION INTRAVENOUS at 05:31

## 2024-02-22 RX ADMIN — HYDROMORPHONE HYDROCHLORIDE 0.5 MG: 1 INJECTION, SOLUTION INTRAMUSCULAR; INTRAVENOUS; SUBCUTANEOUS at 08:26

## 2024-02-22 RX ADMIN — CLOPIDOGREL BISULFATE 75 MG: 75 TABLET ORAL at 08:25

## 2024-02-22 RX ADMIN — CEFAZOLIN SODIUM 1000 MG: 1 SOLUTION INTRAVENOUS at 12:01

## 2024-02-22 RX ADMIN — Medication 1 PATCH: at 08:25

## 2024-02-22 RX ADMIN — MAGNESIUM SULFATE HEPTAHYDRATE 2 G: 40 INJECTION, SOLUTION INTRAVENOUS at 08:25

## 2024-02-22 RX ADMIN — CHLORHEXIDINE GLUCONATE 15 ML: 1.2 RINSE ORAL at 00:00

## 2024-02-22 RX ADMIN — HEPARIN SODIUM 18 UNITS/KG/HR: 10000 INJECTION, SOLUTION INTRAVENOUS at 02:28

## 2024-02-22 RX ADMIN — PROPOFOL 50 MCG/KG/MIN: 10 INJECTION, EMULSION INTRAVENOUS at 07:10

## 2024-02-22 RX ADMIN — HEPARIN SODIUM 15 UNITS/KG/HR: 10000 INJECTION, SOLUTION INTRAVENOUS at 16:31

## 2024-02-22 RX ADMIN — OXYCODONE HYDROCHLORIDE 5 MG: 5 TABLET ORAL at 20:06

## 2024-02-22 RX ADMIN — POTASSIUM CHLORIDE 40 MEQ: 400 INJECTION, SOLUTION INTRAVENOUS at 06:20

## 2024-02-22 RX ADMIN — CALCIUM GLUCONATE 2 G: 20 INJECTION, SOLUTION INTRAVENOUS at 08:25

## 2024-02-22 RX ADMIN — ATORVASTATIN CALCIUM 40 MG: 40 TABLET, FILM COATED ORAL at 16:28

## 2024-02-22 RX ADMIN — PROPOFOL 50 MCG/KG/MIN: 10 INJECTION, EMULSION INTRAVENOUS at 04:11

## 2024-02-22 RX ADMIN — OXYCODONE HYDROCHLORIDE 5 MG: 5 TABLET ORAL at 11:58

## 2024-02-22 RX ADMIN — PROPOFOL 50 MCG/KG/MIN: 10 INJECTION, EMULSION INTRAVENOUS at 02:26

## 2024-02-22 RX ADMIN — CHLORHEXIDINE GLUCONATE 15 ML: 1.2 RINSE ORAL at 08:25

## 2024-02-22 RX ADMIN — AMLODIPINE BESYLATE 5 MG: 5 TABLET ORAL at 08:25

## 2024-02-22 NOTE — PROGRESS NOTES
Critical access hospital  Transfer accept /Progress Note  Name: Georgie King I  MRN: [id number]  Unit/Bed#: ICU 06 I Date of Admission: 2/19/2024   Date of Service: 2/21/2024 I Hospital Day: 2    Assessment/Plan   Dyslipidemia  Assessment & Plan  Newly diagnosed during this admission. Xanthelasmas bilateral eyelids   Cholesterol 162  Triglycerides 204  HDL 43  LDL 78    Atorvastatin 40 mg daily     HTN (hypertension)  Assessment & Plan  Newly diagnosed during this admission  Blood Pressure: 153/82     Norvasc 5 mg daily  Continuous cardiopulmonary monitoring    Morbid obesity (HCC)  Assessment & Plan  Body mass index is 48.74 kg/m².    Consider nutritionist consult  Encourage weight loss     Abnormal CT scan  Assessment & Plan  CT abdomen 2/19/2024 showed incidental prominent left ovary. Nonemergent pelvic ultrasound can be obtained if clinically warranted. Nonspecific bilateral inguinal adenopathy, slightly more pronounced on the right. Largest measures 1.2 cm short axis on the right.      Follow up in 1 week with PCP     Type 2 diabetes mellitus, without long-term current use of insulin (Formerly [facility])  Assessment & Plan  Lab Results   Component Value Date    HGBA1C 12.3 (H) 02/19/2024       Recent Labs     02/20/24  1740 02/20/24  2107 02/21/24  0721 02/21/24  1104   POCGLU 107 118 140 133       Blood Sugar Average: Last 72 hrs:  (P) 148    Newly diagnosed during this admission  Patient is refusing starting insulin regimen upon discharge despite reviewing risk and benefits of chronic management.    Lantus 18 units Humalog 5 units  SSI, Accu-Cheks ACHS  Consider starting patient on oral antiglycemic agents if patient continues to refuse insulin upon discharge  Outpatient follow-up with ophthalmology       Tobacco abuse  Assessment & Plan  0.5 PPD    Nicotine replacement therapy  Smoking cessation education     PAD (peripheral artery disease) (Former[facility])  Assessment & Plan  Newly diagnosed on this admission  Echo  showed EF 65% with no evidence of intracardiac thrombus  Right great toe ischemia and pain likely in the setting of atherosclerotic plaque embolism from distal infrarenal aorta/SFA.  S/p RLE angiogram on 2/21  S/p right embolectomy/thrombectomy    -5L crystalloid in OR   -ebl 200ml   -uop 350ml  -start heparin gtt in 4hrs. Oozing from Left groin  Continue ASA, plavix and lipitor for medical management of PAD   Cardiology following to rule out cardioembolic source; recommendations appreciated  Encourage smoking cessation     * Acute ischemia of Right great toe  Assessment & Plan  Presented to [facility] on 2/19 due to worsening right great toe pain for the past 2 weeks with associated discoloration.  Patient denies fever or chills.  Podiatry evaluation recommended vascular surgery intervention  Leukocytosis of 12.23.  Thrombocytosis 472.   X-ray great toe 2/19/2024: Mild soft tissue swelling overlying the first toe with no acute osseous abnormality.  LEAD 2/19/2024: Right lower limb: Diffuse arterial occlusive disease throughout femoral-popliteal segments without focal stenosis. Evidence of tibial peritoneal disease with occlusion versus high-grade stenosis in the anterior tibial, posterior tibial, and peroneal arteries. EVELIA of 1.39, however may not be reliable secondary to poor compressible vessels. EVELIA left lower limb: 1.32. Of note, multiple echogenic structures located in the right inguinal region suggestive of enlarged lymphatic channels.  CTA abdomen 2/19/24: noncalcific atherosclerotic plaque involving the distal infrarenal aorta with aortic luminal narrowing. Right superficial femoral artery disease related to nonclcific plaque. Poor evaluation of tibial runoff given venous contamination, however  the tibial vessels do appear intact with faint filling of the plantar artery. No significant left lower extremity arterial disease. Splenic artery occlusion, likely chronic. No evidence of splenic infarct. There is  decreased arterial mottling suggestive of decreased perfusion.  Likely secondary to atherosclerotic plaque embolism from distal infrarenal aorta/SFA      RLE angiogram today  See A/P PAD              Disposition: Critical care    ICU Core Measures     Vented Patient  VAP Bundle  VAP Bundle: Not ordered  will order now     A: Assess, Prevent, and Manage Pain Has pain been assessed? Yes  Need for changes to pain regimen? No   B: Both Spontaneous Awakening Trials (SATs) and Spontaneous Breathing Trials (SBTs) Plan to perform spontaneous awakening trial today? Yes   Plan to perform spontaneous breathing trial today? Yes   Obvious barriers to extubation? Yes   C: Choice of Sedation RASS Goal: -2 Light Sedation or 0 Alert and Calm  Need for changes to sedation or analgesia regimen? No   D: Delirium CAM-ICU: Unable to perform secondary to Acute cognitive dysfunction   E: Early Mobility  Plan for early mobility? Yes   F: Family Engagement Plan for family engagement today? Yes       Antibiotic Review: Patient on appropriate coverage based on culture data.     Review of Invasive Devices:    Irineo Plan: Continue for accurate I/O monitoring for 48 hours  Central access plan: Patient requires PICC secondary to picc in place already  Shelley Plan: Keep arterial line for hemodynamic monitoring, frequent ABGs, and frequent labs    Prophylaxis:  VTE VTE covered by:  heparin (porcine), Intravenous, 18 Units/kg/hr at 02/21/24 2238  heparin (porcine), Intravenous  heparin (porcine), Intravenous       Stress Ulcer  not ordered         Significant 24hr Events     24hr events: Pt arrived s/p right LE embolectomy/thrombectomy. Extensive OR time. Intubated and paralyzed pta in ICU. Will continue ett for now. Pulses intact upon arrival to icu. Ebl 200ml, given 5L crystalloid, uop 350. Will start heparin in 4hrs.      Subjective   Review of Systems   Unable to perform ROS: Intubated      Objective                            Vitals I/O      Most  Recent Min/Max in 24hrs   Temp (!) 97.1 °F (36.2 °C) Temp  Min: 97.1 °F (36.2 °C)  Max: 97.4 °F (36.3 °C)   Pulse 66 Pulse  Min: 66  Max: 87   Resp 16 Resp  Min: 16  Max: 20   BP (!) 197/87 BP  Min: 136/97  Max: 197/87   O2 Sat 100 % SpO2  Min: 91 %  Max: 100 %      Intake/Output Summary (Last 24 hours) at 2/21/2024 2257  Last data filed at 2/21/2024 2142  Gross per 24 hour   Intake 4700 ml   Output 535 ml   Net 4165 ml       Diet NPO; Sips with meds    Invasive Monitoring   Arterial Line  [place] /104  Arterial Line BP  Min: 208/80  Max: 266/104    mmHg  Arterial Line MAP (mmHg)  Min: 130 mmHg  Max: 166 mmHg           Physical Exam   Physical Exam  Eyes:      Pupils: Pupils are equal, round, and reactive to light.   Skin:     General: Skin is warm.   HENT:      Head: Normocephalic and atraumatic.      Mouth/Throat:      Mouth: Mucous membranes are moist.      Comments: Ett in place  Cardiovascular:      Rate and Rhythm: Normal rate and regular rhythm.      Pulses: Pulses are Doppler pt/dp right.      Heart sounds: Normal heart sounds.   Musculoskeletal:      Right lower leg: Edema present.      Left lower leg: Edema present.      Comments: Right great toe with ecchymosis   Abdominal:      Palpations: Abdomen is soft.   Constitutional:       Appearance: She is well-developed and well-nourished.   Pulmonary:      Breath sounds: Normal breath sounds.   Neurological:      Comments: Unable to assess 2/2 sedation   Genitourinary/Anorectal:  [place] present.          Diagnostic Studies      EKG: nsr  Imaging:  I have personally reviewed pertinent reports.   and I have personally reviewed pertinent films in PACS     Medications:  Scheduled PRN   amLODIPine, 5 mg, Daily  atorvastatin, 40 mg, Daily With Dinner  [START ON 2/22/2024] cefazolin, 1,000 mg, Q8H  clopidogrel, 75 mg, Daily  [START ON 2/22/2024] insulin glargine, 18 Units, QAM  insulin lispro, 2-12 Units, TID AC  insulin lispro, 2-12 Units, HS  insulin  lispro, 5 Units, TID With Meals  nicotine, 1 patch, Daily      acetaminophen, 975 mg, Q8H PRN  fentanyl citrate (PF), 25 mcg, Q2H PRN  heparin (porcine), 4,600 Units, Q6H PRN  heparin (porcine), 9,200 Units, Q6H PRN  HYDROmorphone, 0.5 mg, Q6H PRN  lactated ringers, 500 mL, Once PRN   And  lactated ringers, 500 mL, Once PRN  naloxone, 0.04 mg, Q1MIN PRN  ondansetron, 4 mg, Q6H PRN  oxyCODONE, 2.5 mg, Q6H PRN   Or  oxyCODONE, 5 mg, Q6H PRN  polyethylene glycol, 17 g, Daily PRN  sodium chloride, 500 mL, Once PRN   And  sodium chloride, 500 mL, Once PRN       Continuous    heparin (porcine), 3-30 Units/kg/hr (Order-Specific), Last Rate: 18 Units/kg/hr (02/21/24 2238)  propofol, 5-50 mcg/kg/min, Last Rate: 25 mcg/kg/min (02/21/24 2246)         Labs:    CBC    Recent Labs     02/20/24 0449 02/21/24 1912   WBC 10.77*  --    HGB 12.6 9.5*   HCT 40.2 28*   *  --      BMP    Recent Labs     02/20/24 0449 02/21/24 1912   SODIUM 138  --    K 3.9  --      --    CO2 23 19*   AGAP 10  --    BUN 10  --    CREATININE 0.53*  --    CALCIUM 9.0  --        Coags    Recent Labs     02/21/24 0453   INR 1.13        Additional Electrolytes  Recent Labs     02/20/24 0449 02/21/24 1912   MG 2.0  --    CAIONIZED  --  1.09*          Blood Gas    No recent results  No recent results LFTs  Recent Labs     02/20/24 0449   ALT 9   AST 8*   ALKPHOS 80   ALB 3.7   TBILI 0.29       Infectious  No recent results  Glucose  Recent Labs     02/20/24 0449   GLUC 193*                 Kacy Avalos PA-C

## 2024-02-22 NOTE — ASSESSMENT & PLAN NOTE
Presented to [facility] on 2/19 due to worsening right great toe pain for the past 2 weeks with associated discoloration.  Patient denies fever or chills.  Podiatry evaluation recommended vascular surgery intervention  Leukocytosis of 12.23.  Thrombocytosis 472.   X-ray great toe 2/19/2024: Mild soft tissue swelling overlying the first toe with no acute osseous abnormality.  LEAD 2/19/2024: Right lower limb: Diffuse arterial occlusive disease throughout femoral-popliteal segments without focal stenosis. Evidence of tibial peritoneal disease with occlusion versus high-grade stenosis in the anterior tibial, posterior tibial, and peroneal arteries. EVELIA of 1.39, however may not be reliable secondary to poor compressible vessels. EVELIA left lower limb: 1.32. Of note, multiple echogenic structures located in the right inguinal region suggestive of enlarged lymphatic channels.  CTA abdomen 2/19/24: noncalcific atherosclerotic plaque involving the distal infrarenal aorta with aortic luminal narrowing. Right superficial femoral artery disease related to nonclcific plaque. Poor evaluation of tibial runoff given venous contamination, however  the tibial vessels do appear intact with faint filling of the plantar artery. No significant left lower extremity arterial disease. Splenic artery occlusion, likely chronic. No evidence of splenic infarct. There is decreased arterial mottling suggestive of decreased perfusion.  Likely secondary to atherosclerotic plaque embolism from distal infrarenal aorta/SFA      S/p RLE embolectomy/ thrombectomy 2/21  See A/P PAD

## 2024-02-22 NOTE — PLAN OF CARE
Problem: PAIN - ADULT  Goal: Verbalizes/displays adequate comfort level or baseline comfort level  Description: Interventions:  - Encourage patient to monitor pain and request assistance  - Assess pain using appropriate pain scale  - Administer analgesics based on type and severity of pain and evaluate response  - Implement non-pharmacological measures as appropriate and evaluate response  - Consider cultural and social influences on pain and pain management  - Notify physician/advanced practitioner if interventions unsuccessful or patient reports new pain  Outcome: Progressing     Problem: SAFETY ADULT  Goal: Patient will remain free of falls  Description: INTERVENTIONS:  - Educate patient/family on patient safety including physical limitations  - Instruct patient to call for assistance with activity   - Consult OT/PT to assist with strengthening/mobility   - Keep Call bell within reach  - Keep bed low and locked with side rails adjusted as appropriate  - Keep care items and personal belongings within reach  - Initiate and maintain comfort rounds  - Make Fall Risk Sign visible to staff  - Apply yellow socks and bracelet for high fall risk patients  - Consider moving patient to room near nurses station  Outcome: Progressing  Goal: Maintain or return to baseline ADL function  Description: INTERVENTIONS:  -  Assess patient's ability to carry out ADLs; assess patient's baseline for ADL function and identify physical deficits which impact ability to perform ADLs (bathing, care of mouth/teeth, toileting, grooming, dressing, etc.)  - Assess/evaluate cause of self-care deficits   - Assess range of motion  - Assess patient's mobility; develop plan if impaired  - Assess patient's need for assistive devices and provide as appropriate  - Encourage maximum independence but intervene and supervise when necessary  - Involve family in performance of ADLs  - Assess for home care needs following discharge   - Consider OT consult  to assist with ADL evaluation and planning for discharge  - Provide patient education as appropriate  Outcome: Progressing  Goal: Maintains/Returns to pre admission functional level  Description: INTERVENTIONS:  - Perform AM-PAC 6 Click Basic Mobility/ Daily Activity assessment daily.  - Set and communicate daily mobility goal to care team and patient/family/caregiver.   - Collaborate with rehabilitation services on mobility goals if consulted  - Out of bed for toileting  - Record patient progress and toleration of activity level   Outcome: Progressing     Problem: DISCHARGE PLANNING  Goal: Discharge to home or other facility with appropriate resources  Description: INTERVENTIONS:  - Identify barriers to discharge w/patient and caregiver  - Arrange for needed discharge resources and transportation as appropriate  - Identify discharge learning needs (meds, wound care, etc.)  - Arrange for interpretive services to assist at discharge as needed  - Refer to Case Management Department for coordinating discharge planning if the patient needs post-hospital services based on physician/advanced practitioner order or complex needs related to functional status, cognitive ability, or social support system  Outcome: Progressing     Problem: Knowledge Deficit  Goal: Patient/family/caregiver demonstrates understanding of disease process, treatment plan, medications, and discharge instructions  Description: Complete learning assessment and assess knowledge base.  Interventions:  - Provide teaching at level of understanding  - Provide teaching via preferred learning methods  Outcome: Progressing     Problem: CARDIOVASCULAR - ADULT  Goal: Absence of cardiac dysrhythmias or at baseline rhythm  Description: INTERVENTIONS:  - Continuous cardiac monitoring, vital signs, obtain 12 lead EKG if ordered  - Administer antiarrhythmic and heart rate control medications as ordered  - Monitor electrolytes and administer replacement therapy as  ordered  Outcome: Progressing     Problem: SKIN/TISSUE INTEGRITY - ADULT  Goal: Skin Integrity remains intact(Skin Breakdown Prevention)  Description: Assess:  -Assess extremities for adequate circulation and sensation     Bed Management:  -Have minimal linens on bed & keep smooth, unwrinkled  -Change linens as needed when moist or perspiring  Toileting:  -Offer bedside commode    Skin Care:  -Avoid use of baby powder, tape, friction and shearing, hot water or constrictive clothing  Outcome: Progressing  Goal: Incision(s), wounds(s) or drain site(s) healing without S/S of infection  Description: INTERVENTIONS  - Assess and document dressing, incision, wound bed, drain sites and surrounding tissue  - Provide patient and family education  Outcome: Progressing  Goal: Pressure injury heals and does not worsen  Description: Interventions:  - Implement low air loss mattress or specialty surface (Criteria met)  - Apply silicone foam dressing  - Consider nutrition services referral as needed  Outcome: Progressing     Problem: MUSCULOSKELETAL - ADULT  Goal: Maintain or return mobility to safest level of function  Description: INTERVENTIONS:  - Assess patient's ability to carry out ADLs; assess patient's baseline for ADL function and identify physical deficits which impact ability to perform ADLs (bathing, care of mouth/teeth, toileting, grooming, dressing, etc.)  - Assess/evaluate cause of self-care deficits   - Assess range of motion  - Assess patient's mobility  - Assess patient's need for assistive devices and provide as appropriate  - Encourage maximum independence but intervene and supervise when necessary  - Involve family in performance of ADLs  - Assess for home care needs following discharge   - Consider OT consult to assist with ADL evaluation and planning for discharge  - Provide patient education as appropriate  Outcome: Progressing  Goal: Maintain proper alignment of affected body part  Description:  INTERVENTIONS:  - Support, maintain and protect limb and body alignment  - Provide patient/ family with appropriate education  Outcome: Progressing     Problem: METABOLIC, FLUID AND ELECTROLYTES - ADULT  Goal: Electrolytes maintained within normal limits  Description: INTERVENTIONS:  - Monitor labs and assess patient for signs and symptoms of electrolyte imbalances  - Administer electrolyte replacement as ordered  - Monitor response to electrolyte replacements, including repeat lab results as appropriate  - Instruct patient on fluid and nutrition as appropriate  Outcome: Progressing  Goal: Fluid balance maintained  Description: INTERVENTIONS:  - Monitor labs   - Monitor I/O and WT  - Instruct patient on fluid and nutrition as appropriate  - Assess for signs & symptoms of volume excess or deficit  Outcome: Progressing  Goal: Glucose maintained within target range  Description: INTERVENTIONS:  - Monitor Blood Glucose as ordered  - Assess for signs and symptoms of hyperglycemia and hypoglycemia  - Administer ordered medications to maintain glucose within target range  - Assess nutritional intake and initiate nutrition service referral as needed  Outcome: Progressing     Problem: Prexisting or High Potential for Compromised Skin Integrity  Goal: Skin integrity is maintained or improved  Description: INTERVENTIONS:  - Identify patients at risk for skin breakdown  - Assess and monitor skin integrity  - Assess and monitor nutrition and hydration status  - Monitor labs   - Assess for incontinence   - Turn and reposition patient  - Assist with mobility/ambulation  - Relieve pressure over bony prominences  - Avoid friction and shearing  - Provide appropriate hygiene as needed including keeping skin clean and dry  - Evaluate need for skin moisturizer/barrier cream  - Collaborate with interdisciplinary team   - Patient/family teaching  - Consider wound care consult   Outcome: Progressing

## 2024-02-22 NOTE — PROGRESS NOTES
Pastoral Care Progress Note    2024  Patient: Georgie King : 1965  Admission Date & Time: 2024 0944  MRN: [id number] [facility]: [id number]                     Chaplaincy Interventions Utilized:   Empowerment: Clarified, confirmed, or reviewed information from treatment team     Exploration: Explored emotional needs & resources    Collaboration: Facilitated respect for spiritual/cultural practice during hospitalization    Relationship Building: Cultivated a relationship of care and support    Chaplaincy Outcomes Achieved:  Expressed gratitude

## 2024-02-22 NOTE — OP NOTE
OPERATIVE REPORT  PATIENT NAME: Georgie King    :  1965  MRN: [id number]  Pt Location: [place] [facility] 09    SURGERY DATE: 2024    Surgeons and Role:     * Linwood Hayes MD - Primary     * Bobo Best MD - Assisting     * Serge Traylor MD - Assisting     * Aditya Rosenberg DO - Fellow    Preop Diagnosis:  PAD (peripheral artery disease) (Formerly [facility]) [I73.9]    Post-Op Diagnosis Codes:     * PAD (peripheral artery disease) (HCC) [I73.9]    Procedure(s):  Right - EMBOLECTOMY/THROMBECTOMY LOWER EXTREMITY Right    Specimen(s):  * No specimens in log *    Estimated Blood Loss:   200 mL    Drains:  Urethral Catheter Non-latex 16 Fr. (Active)   Number of days: 0       Anesthesia Type:   General    Operative Indications:  PAD (peripheral artery disease) (HCC) [I73.9]    60 y/o with PMH HTN, HLD, DM, Tobacco Abuses, Ex-lap presenting with Right Toe pain, atheroembolic acute on chronic lower extremity ischemia taken to IR suite for RLE Angio c attempted lysis catheter placement, mechanical thrombectomy complicated by clinical worsening of RLE ischemia requiring emergent OR for RLE Revascularization. Discussed risk and benefits with  over phone.     Operative Findings:    Left femoral 17Fr sheath with proglide perclose preclosure  Right Femoral 7Fr sheath with proglide perclose     AFX2  bifurcated 22 x 60 device deployed to cover infrarenal aortic atherothrombotic lesion    Right Tibial mechanical thrombectomy of peroneal with Penumbra Cat7 Lighting   Right Posterior Tibial PTA   Right anterior Tibial PTA    Right SFA 6 x 5 Viaban SES with post-dilation 5 x 60 isaak PTA    TPA 6mg   Nitro 300 microgram     Patient with left palpable DP, right bipahsic DP/PT at case conclusion     Patient remained intubated and taken to ICU due to duration of procedure.     FT 58 min    Contrast 130cc Visi    Complications:   None    Procedure and Technique:    1. Ultrasound-guided right common  Femoral Artery Access  2. Aortogram with pelvic runoff  3. Right LE Runoff  4. Right 7Fr sheath   5. Left 17Fr Sheath placement   6. AFX2 bifurcated 22 x 60 stent graft   7. Right peroneal mechanical thrombectomy c Cat7 Lightning   8. Right Post Tib 2 x 220 Max PTA  9. Right Post Tib 3 x 220 Max PTA  10. Right Ant Tib 3 x 220 Max PTA  11. Right SFA 6 x 5 Viabahn SES  12. Right SFA 5 x 60  post-dilation   13. Closure of right femoral artery access site with Perclose closure device  14. Closure of left femoral artery access site with Perclose closure device, x2  15. Supervision and radiologic interpretation of all radiographic imaging    Patient seen and examined in the interventional radiology suite.  Discussed with patient's  over phone and obtain consent for emergent right leg revascularization.  Patient brought to the hybrid operating room and placed supine on the operating table.  Patient had been intubated for the interventional radiology procedure.  A radial A-line was placed.  A Cabello catheter was placed.  Preoperative antibiotics were administered.  The abdomen and bilateral groins and right lower extremity were circumferentially prepped and draped in normal standard fashion.  A timeout was performed.    A left groin 6 Japanese by 45 Japanese sheath with a lysis catheter was in place.  The lysis catheter was wired with a advantage glide wire.  The sheath was then withdrawn into the ipsilateral left iliac system and the advantage Glidewire over the lysis catheter was used to select the descending thoracic aorta.  At that time the Pro-glide preclosures x 2 were deployed in the left femoral artery.  Then a 8 Japanese sheath was placed and again the descending thoracic aorta was selected over an exchange catheter and a 035 stiff Lunderquist wire was placed.  Patient was fully heparinized and ACT's were trended throughout the case to maintain therapeutic levels.  Then a 17 Japanese AFX sheath  was placed over the stiff wire.  This was placed to the level of the aortic bifurcation.  On the contralateral side, right side, a 7 Estonian sheath was placed under ultrasound guidance.  This was done in normal standard fashion with a micropuncture technique.  A 22 x 60 AFX 2 bifurcated device was loaded onto the stiff wire and advanced into the contralateral wire up through the introducer sheath using the wire guide.  The snare catheter was then loaded through the 7 Estonian sheath on the right side.  The guidewire was snared from the left femoral sheath and through the right femoral sheath and pulled out the contra side.  The AFX 2 bifurcated device was transferred into the AFX introducer sheath and advanced under fluoroscopy until distal limbs were above the aortic bifurcation releasing the limbs of the graft.  Then the entire system was pulled down onto the aortic bifurcation.  The the main body graft was deployed by pulling on the contralateral cord handle.  The contralateral limb was then deployed with pulling the yellow limb cover and advanced a pigtail catheter over the contra wire until the tip was in contact with the wire lock.  The ipsilateral limb was deployed by pinning the inner core and retracting the AFX introducer sheath.  Then the delivery device was removed from the AFX introducer sheath and a QX50 balloon was advanced to the left femoral sheath and a 8 x 40  balloon was advanced to the right femoral sheath.  A balloon angioplasty of the AFX 2 bifurcated device was performed with a kissing balloon technique in the iliac system.  Completion angiogram demonstrated patent renal arteries patent endograft with infrarenal aorta patent, bilateral iliac system patent bilateral hypogastric patent.  There is good stent wall apposition to the aortic wall.  There is no evidence of significant thrombotic lesion remaining.    Then the 7 Estonian sheath was removed and a Pro-glide Perclose deployed in the  right groin.  This was successful and the right groin is hemostatic.    The AFX sheath was removed and a Cassoday 16 Rwandan sheath was placed.  Through this a 6 x 45 sheath was placed up and over into the right femoral artery.  A advantage Glidewire and Omni Flush catheter was used to select the right below-knee popliteal artery.  A right lower extremity angiogram demonstrated occluded AT peroneal and PT vessels with reconstitution of the plantar artery.  tPA was administered.  The peroneal artery was selected and a mechanical thrombectomy with penumbra CAT 7 lightning catheter was performed.  There was moderate success.  Then the posterior tibial artery was selected and recannulized using a V18 and Narvon catheter.  We were able to obtain true lumen in the plantar artery.  A 2 x 220 Max and 3 x 220 Max balloon angioplasty was performed.  There is mild success with regaining flow through the true lumen of the posterior tib artery.  Then the anterior tibial artery was selected.  We were unsuccessful and crossing into the dorsal talus artery.  The ostium of the anterior tibial artery was ballooned with a 3 x 220 Max balloon angioplasty.  During this time approximately 6 mg of tPA and 300 mcg of nitro was injected.    Attention was then turned towards right SFA.  Angiogram demonstrated a significant flow-limiting lesion in the proximal SFA that appeared to be a source of further embolism and likely an embolic lesion from the infrarenal aorta atheroma thrombotic lesion.  A 6 x 5 Viabahn covered stent was deployed.  This was postdilated with a 5 x 60 Max balloon.  There was excellent stent wall apposition completion angiogram demonstrated patent SFA with patent stent and no significant flow-limiting lesions.    On completion angiogram there was a patent infrarenal aorta patent iliac system patent femoral artery and profunda artery.  The SFA was patent with no flow flow-limiting lesions.  The popliteal  artery is patent with no flow-limiting lesions.  The anterior tibial artery.  Patent at the ostium with distal occlusion and and heavy collateralization.  The peroneal artery was patent proximally with an ostial occlusion and reconstitution without any significant flow to the foot.  The posterior tib artery appeared patent with sluggish flow.    At this time all sheaths and catheters were removed from the left groin.  The Pro-glide free closures were deployed successfully.  Pressure was held for 15 minutes.    The patient had a biphasic PT signal and DP signal at case conclusion.  She had a palpable left DP pulse.    She remained intubated and was taken to the ICU due to the duration and length of the case.    Dr. Hayes was present and scrubbed out to the duration of the procedure all sharps and counts are correct x 2.      Patient Disposition:  Critical Care Unit        SIGNATURE: Aditya Rosenberg DO  DATE: February 21, 2024  TIME: 10:02 PM        Vascular Quality Initiative - Peripheral Vascular Intervention     Urgency: Emergent    Functional Status:  Restricted in physically strenuous activity but ambulatory and able to carry out work of a light or sedentary nature.   Ambulation: Amb = independently ambulatory    Leg Symptoms    Right: Ischemic Rest Pain  Left: Asymptomatic:  documented peripheral arterial disease without symptoms of claudication or ischemic pain      Treatment of Native Artery to Maintain Bypass Patency?:  No    COVID Information  COVID Symptoms Pre-Procedure: Asymptomatic    Treatment Delayed by Pandemic: None    Access   Number of Sites: 2     Access Site #1:     Side 1: Right    Site 1: Femoral Retrograde    Access Guidance 1:U/S    Largest Sheath Size 1: 7 Fr.    Closure Device 1: Perclose   None      Access Site #2:    Side 2: Left    Site 2: Femoral Retrograde    Access Guidance 2:U/S    Largest Sheath Size 2: 17 Fr.    Closure Device 2: Perclose     Procedure  Fluoro Time: 58  minutes  Contrast Volume: Visipaque 130 ml  DAP: 278 Gy.cm2  CO2: no  Anticoagulant: Heparin  Protamine: No  If Creatinine is > 1.2 or missing, YASMEEN Prophylaxis none     Treatment Details  Indication: Occlusive Disease,    Completion Assessment  Artery 1 treated: SFA        Right               Outflow: AT,PT,Peroneal: 2                  Was this Site previously treated?: No          TASC Grade: A          Total Treated Length: 5 cm          Total Occluded Length: 0 cm          Calcification: None (no calcification visible on fluoroscopic, CT or IVUS imaging)          Number of Treatment types (Devices):   1           Device 1          Treatment Type: Stent Graft                Diameter: 6 mm          Length: 5 mm          Concomitant: None          Technical result: Successful (stenosis <=30%)      Artery 2 treated: Post Tibial  Right                     Was this Site previously treated?: No          TASC Grade: D          Total Treated Length: 22 cm           Total Occluded Length: 22 cm          Calcification: None (no calcification visible on fluoroscopic, CT or IVUS imaging)          Number of Treatment types (Devices):   1           Device 1          Treatment Type: Plain Balloon          Concomitant: None          Technical result: Successful (stenosis <=30%)       None     Post Procedure  Patient currently taking: Statin, Yes      Antiplatelet Medication, Yes    Procedure Complications: No

## 2024-02-22 NOTE — SEPSIS NOTE
Sepsis Note   Georgie King 59 y.o. female MRN: [id number]  Unit/Bed#: ICU 06 Encounter: [id number]    Sepsis alert fired for pulse 94, respiration 21, ventilator, WBCs 14.  Patient is intubated in the immediate postoperative setting, no suspicion of sepsis at this time.  White blood cell count likely secondary to vascular procedures yesterday.        Body mass index is 51.03 kg/m².  Wt Readings from Last 1 Encounters:   02/21/24 123 kg (270 lb 1 oz)     IBW (Ideal Body Weight): 47.8 kg    Ideal body weight: 47.8 kg (105 lb 6.1 oz)  Adjusted ideal body weight: 77.7 kg (171 lb 4.1 oz)

## 2024-02-22 NOTE — PLAN OF CARE
Problem: SAFETY,RESTRAINT: NV/NON-SELF DESTRUCTIVE BEHAVIOR  Goal: Remains free of harm/injury (restraint for non violent/non self-detsructive behavior)  Description: INTERVENTIONS:  - Instruct patient/family regarding restraint use   - Assess and monitor physiologic and psychological status   - Provide interventions and comfort measures to meet assessed patient needs   - Identify and implement measures to help patient regain control  - Assess readiness for release of restraint   Outcome: Progressing  Goal: Returns to optimal restraint-free functioning       Problem: CARDIOVASCULAR - ADULT  Goal: Absence of cardiac dysrhythmias or at baseline rhythm  Description: INTERVENTIONS:  - Continuous cardiac monitoring, vital signs, obtain 12 lead EKG if ordered  - Administer antiarrhythmic and heart rate control medications as ordered  - Monitor electrolytes and administer replacement therapy as ordered  Outcome: Progressing     Problem: MUSCULOSKELETAL - ADULT  Goal: Maintain or return mobility to safest level of function  Description: INTERVENTIONS:  - Assess patient's ability to carry out ADLs; assess patient's baseline for ADL function and identify physical deficits which impact ability to perform ADLs (bathing, care of mouth/teeth, toileting, grooming, dressing, etc.)  - Assess/evaluate cause of self-care deficits   - Assess range of motion  - Assess patient's mobility  - Assess patient's need for assistive devices and provide as appropriate  - Encourage maximum independence but intervene and supervise when necessary  - Involve family in performance of ADLs  - Assess for home care needs following discharge   - Consider OT consult to assist with ADL evaluation and planning for discharge  - Provide patient education as appropriate  Outcome: Progressing  Goal: Maintain proper alignment of affected body part  Description: INTERVENTIONS:  - Support, maintain and protect limb and body alignment  - Provide patient/ family  with appropriate education  Outcome: Progressing

## 2024-02-22 NOTE — ASSESSMENT & PLAN NOTE
Newly diagnosed on this admission  Echo showed EF 65% with no evidence of intracardiac thrombus  Right great toe ischemia and pain likely in the setting of atherosclerotic plaque embolism from distal infrarenal aorta/SFA.  S/p right embolectomy/thrombectomy 2/21   -5L crystalloid in OR   -ebl 200ml   -uop 350ml  -start heparin gtt in 4hrs. Oozing from Left groin  Continue ASA, plavix and lipitor for medical management of PAD   Cardiology following to rule out cardioembolic source; recommendations appreciated  Encourage smoking cessation

## 2024-02-22 NOTE — PROGRESS NOTES
02/22/24 1300   Clinical Encounter Type   Visited With Patient and family together   Routine Visit Introduction   Continue Visiting Yes

## 2024-02-22 NOTE — PROGRESS NOTES
[facility]  Progress Note  Name: Georgie King I  MRN: [id number]  Unit/Bed#: ICU 06 I Date of Admission: 2/19/2024   Date of Service: 2/22/2024 I Hospital Day: 3    Assessment/Plan   * Acute ischemia of Right great toe  Assessment & Plan  Presented to [facility] on 2/19 due to worsening right great toe pain for the past 2 weeks with associated discoloration.  Patient denies fever or chills.  Podiatry evaluation recommended vascular surgery intervention  Leukocytosis of 12.23.  Thrombocytosis 472.   X-ray great toe 2/19/2024: Mild soft tissue swelling overlying the first toe with no acute osseous abnormality.  LEAD 2/19/2024: Right lower limb: Diffuse arterial occlusive disease throughout femoral-popliteal segments without focal stenosis. Evidence of tibial peritoneal disease with occlusion versus high-grade stenosis in the anterior tibial, posterior tibial, and peroneal arteries. EVELIA of 1.39, however may not be reliable secondary to poor compressible vessels. EVELIA left lower limb: 1.32. Of note, multiple echogenic structures located in the right inguinal region suggestive of enlarged lymphatic channels.  CTA abdomen 2/19/24: noncalcific atherosclerotic plaque involving the distal infrarenal aorta with aortic luminal narrowing. Right superficial femoral artery disease related to nonclcific plaque. Poor evaluation of tibial runoff given venous contamination, however  the tibial vessels do appear intact with faint filling of the plantar artery. No significant left lower extremity arterial disease. Splenic artery occlusion, likely chronic. No evidence of splenic infarct. There is decreased arterial mottling suggestive of decreased perfusion.  Likely secondary to atherosclerotic plaque embolism from distal infrarenal aorta/SFA      S/p RLE embolectomy/ thrombectomy 2/21  See A/P PAD     PAD (peripheral artery disease) (HCC)  Assessment & Plan  Newly diagnosed on this admission  Echo showed EF 65%  with no evidence of intracardiac thrombus  Right great toe ischemia and pain likely in the setting of atherosclerotic plaque embolism from distal infrarenal aorta/SFA.  S/p right embolectomy/thrombectomy 2/21   -5L crystalloid in OR   -ebl 200ml   -uop 350ml  -start heparin gtt in 4hrs. Oozing from Left groin  Continue ASA, plavix and lipitor for medical management of PAD   Cardiology following to rule out cardioembolic source; recommendations appreciated  Encourage smoking cessation     Dyslipidemia  Assessment & Plan  Newly diagnosed during this admission. Xanthelasmas bilateral eyelids   Cholesterol 162  Triglycerides 204  HDL 43  LDL 78    Atorvastatin 40 mg daily     HTN (hypertension)  Assessment & Plan  Newly diagnosed during this admission  Blood Pressure: 124/60     Norvasc 5 mg daily  Continuous cardiopulmonary monitoring    Morbid obesity (HCC)  Assessment & Plan  Body mass index is 51.03 kg/m².    Consider nutritionist consult  Encourage weight loss     Abnormal CT scan  Assessment & Plan  CT abdomen 2/19/2024 showed incidental prominent left ovary. Nonemergent pelvic ultrasound can be obtained if clinically warranted. Nonspecific bilateral inguinal adenopathy, slightly more pronounced on the right. Largest measures 1.2 cm short axis on the right.      Follow up in 1 week with PCP          ----------------------------------------------------------------------------------------  HPI/24hr events: Patient successfully extubated.    Patient appropriate for transfer out of the ICU today?: No  Disposition: Continue Critical Care   Code Status: Level 1 - Full Code  ---------------------------------------------------------------------------------------  SUBJECTIVE  Patient complains of a sore throat.  Patient was extubated this morning.  Will continue to monitor.    Review of Systems   Constitutional:  Negative for chills and fever.   HENT:  Positive for sore throat. Negative for trouble swallowing.    Eyes:   Negative for visual disturbance.   Respiratory:  Negative for chest tightness, shortness of breath and wheezing.    Cardiovascular:  Negative for chest pain and palpitations.   Gastrointestinal:  Positive for constipation. Negative for abdominal pain, nausea and vomiting.   Genitourinary:  Negative for dysuria.   Neurological:  Negative for dizziness, weakness, light-headedness and headaches.       ---------------------------------------------------------------------------------------  OBJECTIVE    Vitals   Vitals:    24 1100 24 1110 24 1115 24 1200   BP: 127/62      BP Location:       Pulse: 88  86 86   Resp: 19  (!) 24 (!) 25   Temp: 99 °F (37.2 °C) 97.8 °F (36.6 °C)     TempSrc:  Oral     SpO2: 99%  95% 97%   Weight:       Height:         Temp (24hrs), Av.9 °F (36.6 °C), Min:96.1 °F (35.6 °C), Max:99.3 °F (37.4 °C)  Current: Temperature: 97.8 °F (36.6 °C)  Arterial Line BP: 160/56  Arterial Line MAP (mmHg): 88 mmHg    Respiratory:  SpO2: SpO2: 97 %  Nasal Cannula O2 Flow Rate (L/min): 3 L/min    Invasive/non-invasive ventilation settings   Respiratory      Lab Data (Last 4 hours)      None           O2/Vent Data (Last 4 hours)      None                    Physical Exam  Constitutional:       General: She is not in acute distress.     Appearance: Normal appearance. She is obese. She is not ill-appearing.   HENT:      Head: Normocephalic and atraumatic.      Right Ear: External ear normal.      Left Ear: External ear normal.      Nose: Nose normal. No congestion or rhinorrhea.      Mouth/Throat:      Mouth: Mucous membranes are moist.      Pharynx: Oropharynx is clear.   Eyes:      General: No scleral icterus.        Right eye: No discharge.         Left eye: No discharge.      Extraocular Movements: Extraocular movements intact.   Cardiovascular:      Rate and Rhythm: Normal rate and regular rhythm.      Pulses: Normal pulses.      Heart sounds: Normal heart sounds.   Pulmonary:       Effort: Pulmonary effort is normal. No respiratory distress.      Breath sounds: Normal breath sounds.   Chest:      Chest wall: No tenderness.   Abdominal:      General: Bowel sounds are normal.      Palpations: Abdomen is soft.      Tenderness: There is no abdominal tenderness. There is no guarding.   Musculoskeletal:      Cervical back: Normal range of motion.      Right lower leg: No edema.      Left lower leg: No edema.      Comments: Erythema of right foot.  Bluish/black discoloration of right great toe.   Skin:     Capillary Refill: Capillary refill takes less than 2 seconds.   Neurological:      Mental Status: She is alert and oriented to person, place, and time.             Laboratory and Diagnostics:  Results from last 7 days   Lab Units 02/22/24  1152 02/22/24 0432 02/21/24 2331 02/21/24 1912 02/21/24 1737 02/20/24 0449 02/19/24  1006   WBC Thousand/uL  --  14.44* 15.17*  --   --  10.77* 12.23*   HEMOGLOBIN g/dL 9.1* 9.0* 10.4*  --   --  12.6 13.3   I STAT HEMOGLOBIN g/dl  --   --   --  9.5* 9.9*  --   --    HEMATOCRIT %  --  28.7* 32.6*  --   --  40.2 42.6   HEMATOCRIT, ISTAT %  --   --   --  28* 29*  --   --    PLATELETS Thousands/uL  --  318 373  --   --  442* 472*   NEUTROS PCT %  --  68 70  --   --  59 69   MONOS PCT %  --  5 5  --   --  7 6   EOS PCT %  --  1 1  --   --  2 1     Results from last 7 days   Lab Units 02/22/24  0432 02/21/24 2331 02/21/24 1912 02/21/24 1737 02/20/24 0449 02/19/24  1155   SODIUM mmol/L 138 139  139  --   --  138 138   POTASSIUM mmol/L 3.5 3.8  3.8  --   --  3.9 3.9   CHLORIDE mmol/L 111* 110*  110*  --   --  105 102   CO2 mmol/L 23 20*  20*  --   --  23 27   CO2, I-STAT mmol/L  --   --  19* 21  --   --    ANION GAP mmol/L 4 9  9  --   --  10 9   BUN mg/dL 8 9  9  --   --  10 10   CREATININE mg/dL 0.55* 0.58*  0.58*  --   --  0.53* 0.62   CALCIUM mg/dL 7.2* 7.4*  7.4*  --   --  9.0 9.4   GLUCOSE RANDOM mg/dL 130 156*  156*  --   --  193* 223*   ALT U/L   "--  9  --   --  9 11   AST U/L  --  10*  --   --  8* 9*   ALK PHOS U/L  --  73  --   --  80 102   ALBUMIN g/dL  --  3.1*  --   --  3.7 4.1   TOTAL BILIRUBIN mg/dL  --  0.32  --   --  0.29 0.25     Results from last 7 days   Lab Units 02/22/24  0432 02/20/24  0449   MAGNESIUM mg/dL 1.8* 2.0   PHOSPHORUS mg/dL 4.3*  --       Results from last 7 days   Lab Units 02/22/24  0848 02/22/24  0231 02/21/24  0453   INR   --  1.24* 1.13   PTT seconds >210* 67*  --               ABG:  Results from last 7 days   Lab Units 02/21/24  2331   PH ART  7.291*   PCO2 ART mm Hg 42.5   PO2 ART mm Hg 126.5   HCO3 ART mmol/L 20.0*   BASE EXC ART mmol/L -6.2   ABG SOURCE  Line, Arterial     VBG:  Results from last 7 days   Lab Units 02/21/24  2331   ABG SOURCE  Line, Arterial           Imaging: I have personally reviewed pertinent reports.      Intake and Output  I/O         02/20 0701 02/21 0700 02/21 0701  02/22 0700 02/22 0701 02/23 0700    I.V. (mL/kg)  7546.8 (61.9) 79.9 (0.7)    NG/GT   60    IV Piggyback   300    Total Intake(mL/kg)  7546.8 (61.9) 439.9 (3.6)    Urine (mL/kg/hr)  1005 (0.3) 375 (0.3)    Emesis/NG output  400     Blood  200     Total Output  1605 375    Net  +5941.8 +64.9                   Height and Weights   Height: 5' 1\" (154.9 cm)  IBW (Ideal Body Weight): 47.8 kg  Body mass index is 51.03 kg/m².  Weight (last 2 days)       Date/Time Weight    02/21/24 2300 123 (270.06)    02/20/24 09:38:59 117 (257.94)              Nutrition       Diet Orders   (From admission, onward)                 Start     Ordered    02/22/24 1236  Diet [place]/CHO Controlled; Consistent Carbohydrate Diet Level 2 (5 carb servings/75 grams CHO/meal)  Diet effective midnight        References:    Adult Nutrition Support Algorithm    RD Therapeutic Diet Order Protocol   Question Answer Comment   Diet Type Davonte/CHO Controlled    Dav[place]/CHO Controlled Consistent Carbohydrate Diet Level 2 (5 carb servings/75 grams CHO/meal)    RD to adjust diet per " protocol? Yes        02/22/24 1235                      Active Medications  Scheduled Meds:  Current Facility-Administered Medications   Medication Dose Route Frequency Provider Last Rate    acetaminophen  975 mg Oral Q8H PRN Aditya Rosenberg, DO      amLODIPine  5 mg Oral Daily Aditya Rosenberg, DO      atorvastatin  40 mg Oral Daily With Dinner Aditya Rosenberg, DO      clopidogrel  75 mg Oral Daily Aditya Rosenberg, DO      heparin (porcine)  3-30 Units/kg/hr (Order-Specific) Intravenous Titrated Aditya Mcdaniel Chet, DO 15 Units/kg/hr (02/22/24 1230)    heparin (porcine)  4,600 Units Intravenous Q6H PRN Aditya Rosenberg, DO      heparin (porcine)  9,200 Units Intravenous Q6H PRN Aditya Rosenberg, DO      HYDROmorphone  0.5 mg Intravenous Q6H PRN Aditya Rosenberg, DO      insulin lispro  2-12 Units Subcutaneous Q6H [facility] Kacy Avalos PA-C      naloxone  0.04 mg Intravenous Q1MIN PRN Aditya Rosenberg, DO      nicotine  1 patch Transdermal Daily Aditya Rosenberg, DO      ondansetron  4 mg Intravenous Q6H PRN Aditya Rosenberg, DO      oxyCODONE  2.5 mg Oral Q6H PRN Aditya Rosenberg, DO      Or    oxyCODONE  5 mg Oral Q6H PRN Aditya Rosenberg, DO      polyethylene glycol  17 g Oral Daily PRN Aditya Rosenberg, DO      propofol  5-50 mcg/kg/min Intravenous Titrated Kacy Avalos PA-C Stopped (02/22/24 1100)     Continuous Infusions:  heparin (porcine), 3-30 Units/kg/hr (Order-Specific), Last Rate: 15 Units/kg/hr (02/22/24 1230)  propofol, 5-50 mcg/kg/min, Last Rate: Stopped (02/22/24 1100)      PRN Meds:   acetaminophen, 975 mg, Q8H PRN  heparin (porcine), 4,600 Units, Q6H PRN  heparin (porcine), 9,200 Units, Q6H PRN  HYDROmorphone, 0.5 mg, Q6H PRN  naloxone, 0.04 mg, Q1MIN PRN  ondansetron, 4 mg, Q6H PRN  oxyCODONE, 2.5 mg, Q6H PRN   Or  oxyCODONE, 5 mg, Q6H PRN  polyethylene glycol, 17 g, Daily PRN        Invasive Devices Review  Invasive Devices        "Peripherally Inserted Central Catheter Line  Duration             PICC Line 02/21/24 1 day              Peripheral Intravenous Line  Duration             Peripheral IV 02/21/24 Right;Ventral (anterior) Forearm 1 day              Arterial Line  Duration             Arterial Line 02/21/24 Right Radial <1 day              Line  Duration             Arterial Sheath 6 Fr. Left Femoral 1 day              Drain  Duration             NG/OG/Enteral Tube Orogastric Left mouth <1 day    Urethral Catheter Non-latex 16 Fr. <1 day                    ---------------------------------------------------------------------------------------  Advance Directive and Living Will:      Power of :    POLST:    ---------------------------------------------------------------------------------------  Care Time Delivered:   Upon my evaluation, this patient had a high probability of imminent or life-threatening deterioration due to management s/p embolectomy/thrombectomy, which required my direct attention, intervention, and personal management.  I have personally provided 35 minutes of critical care time, exclusive of procedures, teaching, family meetings, and any prior time recorded by providers other than myself.       Thelma Hobbs MD      Portions of the record may have been created with voice recognition software.  Occasional wrong word or \"sound a like\" substitutions may have occurred due to the inherent limitations of voice recognition software.  Read the chart carefully and recognize, using context, where substitutions have occurred      "

## 2024-02-22 NOTE — ASSESSMENT & PLAN NOTE
Newly diagnosed during this admission  Blood Pressure: 124/60     Norvasc 5 mg daily  Continuous cardiopulmonary monitoring

## 2024-02-22 NOTE — PROGRESS NOTES
"Progress Note - Vascular Surgery   Georgie King 59 y.o. female MRN: [id number]  Unit/Bed#: ICU 06 Encounter: [id number]    Assessment:  59 F with Hx HTN, HLD, DM, Smoking, p/w acute on chronic atheroembolic ischemia of RLE s/p attempted thrombolysis on 2/21 c/b worsening ischemia, taken emergently for surgical intervention including EVAR, Suction/mechanical thrombectomy of R pop/TPT, AT/PT POBA, R Sfa Viabahn stent placement.     Plan:  -wean to extubate as able  -monitor lower extremity neurovascular status  -resume heparin Gtt  -will eventually need DOAC and plavix  -continue asa/statin  -allow toe to demarcate, no further vascular procedural intervention  -out of bed as able following extubation   -will need eventual oral anticoagulant  -smoking cessation  -cardiology, medical critical care input for management appreciated     Subjective/Objective   Chief Complaint: n/a    Subjective: sedated, unable to report     Objective: hemodynamically stable, RLE Warm, stable signals overnight     Blood pressure 124/60, pulse 68, temperature 98.2 °F (36.8 °C), resp. rate 16, height 5' 1\" (1.549 m), weight 123 kg (270 lb 1 oz), SpO2 100%.,Body mass index is 51.03 kg/m².      Intake/Output Summary (Last 24 hours) at 2/22/2024 0637  Last data filed at 2/22/2024 0600  Gross per 24 hour   Intake 7546.8 ml   Output 1605 ml   Net 5941.8 ml       Invasive Devices       Peripherally Inserted Central Catheter Line  Duration             PICC Line 02/21/24 <1 day              Peripheral Intravenous Line  Duration             Peripheral IV 02/21/24 Right;Ventral (anterior) Forearm <1 day              Arterial Line  Duration             Arterial Line 02/21/24 Right Radial <1 day              Line  Duration             Arterial Sheath 6 Fr. Left Femoral <1 day              Drain  Duration             NG/OG/Enteral Tube Orogastric Left mouth <1 day    Urethral Catheter Non-latex 16 Fr. <1 day              Airway  Duration             ETT  " Cuffed;Oral;Inflated 7 mm <1 day                    Physical Exam:     General: NAD, intubated, Morbidly Obese   HEENT: normocephalic, atraumatic, ETT in place   Cardiovascular: RRR  Respiratory: no distress, S CMV 16/400/60%/6   Abdomen: soft, non-distended   Extremities: RLE warm to hallux, persistently bluish appearing toe, b/l DP/PT signals audible   Neuro: sedated  Skin: warm, dry       Lab, Imaging and other studies:I have personally reviewed pertinent lab results.  , CBC:   Lab Results   Component Value Date    WBC 14.44 (H) 02/22/2024    HGB 9.0 (L) 02/22/2024    HCT 28.7 (L) 02/22/2024    MCV 84 02/22/2024     02/22/2024    RBC 3.41 (L) 02/22/2024    MCH 26.4 (L) 02/22/2024    MCHC 31.4 02/22/2024    RDW 13.6 02/22/2024    MPV 9.9 02/22/2024    NRBC 0 02/22/2024   , CMP:   Lab Results   Component Value Date    SODIUM 138 02/22/2024    K 3.5 02/22/2024     (H) 02/22/2024    CO2 23 02/22/2024    CO2 19 (L) 02/21/2024    BUN 8 02/22/2024    CREATININE 0.55 (L) 02/22/2024    GLUCOSE 130 02/21/2024    CALCIUM 7.2 (L) 02/22/2024    AST 10 (L) 02/21/2024    ALT 9 02/21/2024    ALKPHOS 73 02/21/2024    EGFR 102 02/22/2024     VTE Pharmacologic Prophylaxis: Heparin  VTE Mechanical Prophylaxis: sequential compression device

## 2024-02-23 LAB
ANION GAP SERPL CALCULATED.3IONS-SCNC: 7 MMOL/L
APTT PPP: 59 SECONDS (ref 23–37)
APTT PPP: 60 SECONDS (ref 23–37)
APTT PPP: 99 SECONDS (ref 23–37)
BASOPHILS # BLD AUTO: 0.05 THOUSANDS/ÂΜL (ref 0–0.1)
BASOPHILS NFR BLD AUTO: 0 % (ref 0–1)
BUN SERPL-MCNC: 7 MG/DL (ref 5–25)
CALCIUM SERPL-MCNC: 7.9 MG/DL (ref 8.4–10.2)
CHLORIDE SERPL-SCNC: 110 MMOL/L (ref 96–108)
CO2 SERPL-SCNC: 23 MMOL/L (ref 21–32)
CREAT SERPL-MCNC: 0.53 MG/DL (ref 0.6–1.3)
EOSINOPHIL # BLD AUTO: 0.22 THOUSAND/ÂΜL (ref 0–0.61)
EOSINOPHIL NFR BLD AUTO: 2 % (ref 0–6)
ERYTHROCYTE [DISTWIDTH] IN BLOOD BY AUTOMATED COUNT: 13.9 % (ref 11.6–15.1)
GFR SERPL CREATININE-BSD FRML MDRD: 103 ML/MIN/1.73SQ M
GLUCOSE SERPL-MCNC: 118 MG/DL (ref 65–140)
GLUCOSE SERPL-MCNC: 123 MG/DL (ref 65–140)
GLUCOSE SERPL-MCNC: 123 MG/DL (ref 65–140)
GLUCOSE SERPL-MCNC: 125 MG/DL (ref 65–140)
GLUCOSE SERPL-MCNC: 130 MG/DL (ref 65–140)
GLUCOSE SERPL-MCNC: 156 MG/DL (ref 65–140)
GLUCOSE SERPL-MCNC: 208 MG/DL (ref 65–140)
HCT VFR BLD AUTO: 28.4 % (ref 34.8–46.1)
HGB BLD-MCNC: 8.7 G/DL (ref 11.5–15.4)
IMM GRANULOCYTES # BLD AUTO: 0.05 THOUSAND/UL (ref 0–0.2)
IMM GRANULOCYTES NFR BLD AUTO: 0 % (ref 0–2)
LYMPHOCYTES # BLD AUTO: 2.99 THOUSANDS/ÂΜL (ref 0.6–4.47)
LYMPHOCYTES NFR BLD AUTO: 26 % (ref 14–44)
MAGNESIUM SERPL-MCNC: 2.1 MG/DL (ref 1.9–2.7)
MCH RBC QN AUTO: 26.3 PG (ref 26.8–34.3)
MCHC RBC AUTO-ENTMCNC: 30.6 G/DL (ref 31.4–37.4)
MCV RBC AUTO: 86 FL (ref 82–98)
MONOCYTES # BLD AUTO: 0.86 THOUSAND/ÂΜL (ref 0.17–1.22)
MONOCYTES NFR BLD AUTO: 8 % (ref 4–12)
NEUTROPHILS # BLD AUTO: 7.26 THOUSANDS/ÂΜL (ref 1.85–7.62)
NEUTS SEG NFR BLD AUTO: 64 % (ref 43–75)
NRBC BLD AUTO-RTO: 0 /100 WBCS
PHOSPHATE SERPL-MCNC: 3.3 MG/DL (ref 2.3–4.1)
PLATELET # BLD AUTO: 316 THOUSANDS/UL (ref 149–390)
PMV BLD AUTO: 10.4 FL (ref 8.9–12.7)
POTASSIUM SERPL-SCNC: 3.5 MMOL/L (ref 3.5–5.3)
RBC # BLD AUTO: 3.31 MILLION/UL (ref 3.81–5.12)
SODIUM SERPL-SCNC: 140 MMOL/L (ref 135–147)
WBC # BLD AUTO: 11.43 THOUSAND/UL (ref 4.31–10.16)

## 2024-02-23 PROCEDURE — 99232 SBSQ HOSP IP/OBS MODERATE 35: CPT | Performed by: INTERNAL MEDICINE

## 2024-02-23 PROCEDURE — 82948 REAGENT STRIP/BLOOD GLUCOSE: CPT

## 2024-02-23 PROCEDURE — 83735 ASSAY OF MAGNESIUM: CPT

## 2024-02-23 PROCEDURE — 99233 SBSQ HOSP IP/OBS HIGH 50: CPT | Performed by: SURGERY

## 2024-02-23 PROCEDURE — 80048 BASIC METABOLIC PNL TOTAL CA: CPT

## 2024-02-23 PROCEDURE — 85730 THROMBOPLASTIN TIME PARTIAL: CPT | Performed by: NURSE PRACTITIONER

## 2024-02-23 PROCEDURE — 85025 COMPLETE CBC W/AUTO DIFF WBC: CPT

## 2024-02-23 PROCEDURE — 85730 THROMBOPLASTIN TIME PARTIAL: CPT | Performed by: PHYSICIAN ASSISTANT

## 2024-02-23 PROCEDURE — 84100 ASSAY OF PHOSPHORUS: CPT

## 2024-02-23 RX ORDER — ONDANSETRON 2 MG/ML
4 INJECTION INTRAMUSCULAR; INTRAVENOUS ONCE AS NEEDED
Status: DISCONTINUED | OUTPATIENT
Start: 2024-02-23 | End: 2024-02-24

## 2024-02-23 RX ORDER — LISINOPRIL 10 MG/1
10 TABLET ORAL DAILY
Status: DISCONTINUED | OUTPATIENT
Start: 2024-02-23 | End: 2024-02-26 | Stop reason: HOSPADM

## 2024-02-23 RX ORDER — FENTANYL CITRATE/PF 50 MCG/ML
25 SYRINGE (ML) INJECTION
Status: DISCONTINUED | OUTPATIENT
Start: 2024-02-23 | End: 2024-02-23

## 2024-02-23 RX ORDER — CEFAZOLIN SODIUM 2 G/50ML
2000 SOLUTION INTRAVENOUS ONCE
Status: DISCONTINUED | OUTPATIENT
Start: 2024-02-23 | End: 2024-02-26 | Stop reason: HOSPADM

## 2024-02-23 RX ORDER — INSULIN LISPRO 100 [IU]/ML
2-12 INJECTION, SOLUTION INTRAVENOUS; SUBCUTANEOUS
Status: DISCONTINUED | OUTPATIENT
Start: 2024-02-23 | End: 2024-02-26 | Stop reason: HOSPADM

## 2024-02-23 RX ADMIN — LISINOPRIL 10 MG: 10 TABLET ORAL at 08:56

## 2024-02-23 RX ADMIN — INSULIN LISPRO 2 UNITS: 100 INJECTION, SOLUTION INTRAVENOUS; SUBCUTANEOUS at 12:52

## 2024-02-23 RX ADMIN — AMLODIPINE BESYLATE 5 MG: 5 TABLET ORAL at 08:56

## 2024-02-23 RX ADMIN — OXYCODONE HYDROCHLORIDE 5 MG: 5 TABLET ORAL at 21:08

## 2024-02-23 RX ADMIN — INSULIN LISPRO 4 UNITS: 100 INJECTION, SOLUTION INTRAVENOUS; SUBCUTANEOUS at 21:37

## 2024-02-23 RX ADMIN — ATORVASTATIN CALCIUM 40 MG: 40 TABLET, FILM COATED ORAL at 15:53

## 2024-02-23 RX ADMIN — RIVAROXABAN 20 MG: 20 TABLET, FILM COATED ORAL at 15:53

## 2024-02-23 RX ADMIN — ACETAMINOPHEN 325MG 975 MG: 325 TABLET ORAL at 05:31

## 2024-02-23 RX ADMIN — CLOPIDOGREL BISULFATE 75 MG: 75 TABLET ORAL at 08:56

## 2024-02-23 RX ADMIN — OXYCODONE HYDROCHLORIDE 5 MG: 5 TABLET ORAL at 10:47

## 2024-02-23 RX ADMIN — HEPARIN SODIUM 10 UNITS/KG/HR: 10000 INJECTION, SOLUTION INTRAVENOUS at 14:16

## 2024-02-23 NOTE — ASSESSMENT & PLAN NOTE
Newly diagnosed during this admission  Blood Pressure: 154/66     Norvasc 5 mg daily  Start Lisinopril given uncontrolled blood pressures on one BP regimen and new diagnosis of diabetes  Continuous cardiopulmonary monitoring

## 2024-02-23 NOTE — ASSESSMENT & PLAN NOTE
Presented to [facility] on 2/19 due to worsening right great toe pain for the past 2 weeks with associated discoloration.  Patient denies fever or chills.  Podiatry evaluation recommended vascular surgery intervention  Leukocytosis of 12.23.  Thrombocytosis 472.   X-ray great toe 2/19/2024: Mild soft tissue swelling overlying the first toe with no acute osseous abnormality.  LEAD 2/19/2024: Right lower limb: Diffuse arterial occlusive disease throughout femoral-popliteal segments without focal stenosis. Evidence of tibial peritoneal disease with occlusion versus high-grade stenosis in the anterior tibial, posterior tibial, and peroneal arteries. EVELIA of 1.39, however may not be reliable secondary to poor compressible vessels. EVELIA left lower limb: 1.32. Of note, multiple echogenic structures located in the right inguinal region suggestive of enlarged lymphatic channels.  CTA abdomen 2/19/24: noncalcific atherosclerotic plaque involving the distal infrarenal aorta with aortic luminal narrowing. Right superficial femoral artery disease related to nonclcific plaque. Poor evaluation of tibial runoff given venous contamination, however  the tibial vessels do appear intact with faint filling of the plantar artery. No significant left lower extremity arterial disease. Splenic artery occlusion, likely chronic. No evidence of splenic infarct. There is decreased arterial mottling suggestive of decreased perfusion.  Likely secondary to atherosclerotic plaque embolism from distal infrarenal aorta/SFA      S/p RLE embolectomy/ thrombectomy 2/21  See A/P PAD

## 2024-02-23 NOTE — ASSESSMENT & PLAN NOTE
Lab Results   Component Value Date    HGBA1C 12.3 (H) 02/19/2024       Recent Labs     02/23/24  0052 02/23/24  0517 02/23/24  0738 02/23/24  1138   POCGLU 125 118 123 156*         Blood Sugar Average: Last 72 hrs:  (P) [id number]    Newly diagnosed during this admission  Patient is refusing starting insulin regimen upon discharge despite reviewing risk and benefits of chronic management.    Lantus 18 units Humalog 5 units  SSI, Accu-Cheks ACHS  Consider starting patient on oral antiglycemic agents if patient continues to refuse insulin upon discharge  Outpatient follow-up with ophthalmology

## 2024-02-23 NOTE — ASSESSMENT & PLAN NOTE
58 yo female smoker w/ no past medical history presented to [facility] on 2/19/24 w/ R toe pain. Pt was diagnosed w/ distal embolism to R toes likely secondary to atherosclerotic plaque. Pt was seen in consult by cardiology. Echo showed normal EF w/ no evidence of intracardiac thrombus. During her hospital stay, she was newly diagnosed w/ HTN, dyslipidemia, obesity, DM II and severe PAD.    Vascular surgery consulted for R great toe pain. LEAD showed tibioperoneal disease w/ occlusions vs high grade stenoses in the R AT, PT and peroneal arteries, likely secondary to atheroemboli from distal aorta or SFA. CTA abdomen w/ B/L runoff showed noncalcific atherosclerotic plaque involving the distal infrarenal aorta w/ aortic luminal narrowing and R SFA disease. Pt is S/P RLE angiogram in IR on 2/21 w/ an unsuccessful attempt at SFA thrombectomy. Tibial arteries completely thrombosed. Initially successful AT/peroneal thrombectomy rethrombosed without arterial outflow. Attempt at lysis in IR aborted in favor of thrombectomy in OR secondary to rapid worsening of foot. Pt is S/P EVAR, RLE pop/TPT thrombectomy, R SFA stent and PT/AT angioplasty in OR on 2/21.       Diagnostics:  -2/19/24 R great toe xray: Mild soft tissue swelling overlying the first toe with no acute osseous abnormality.   -2/19/24 LEAD: Right: Evidence of diffuse arterial occlusive disease throughout the femoropopliteal segments without focal stenosis. Evidence of tibioperoneal disease with occlusions vs high grade stenoses in the anterior tibial, posterior tibial and peroneal arteries. R EVELIA: 1.39/-/- (poorly compressible vessels). Left: L EVELIA: 1.32/220/110.  -2/19/24 CTA abdomen w/ runoff: Large volume of noncalcific atherosclerotic plaque involving the distal infrarenal aorta with aortic luminal narrowing. R SFA disease related to noncalcific plaque. The tibial runoff is poorly evaluated given venous contamination; however, the tibial vessels do appear intact  with faint filling of the plantar artery. The dorsalis pedis is not well visualized. No significant left lower extremity arterial disease. Splenic artery occlusion, likely chronic. No evidence of splenic infarct. There is decreased arterial mottling suggestive of decreased perfusion.    Plan:  -R great toe ischemia and pain without sensory/motor deficits  -Likely secondary to atherosclerotic plaque embolism from distal infrarenal aorta/SFA   -S/P EVAR w/ RLE pop/TPT thrombectomy, R SFA stent and PT/AT angioplasty in OR on 2/21  -B/L groin sites soft w/ small areas of ecchymosis, no erythema or drainage, GRAHAM  -Continue plavix and lipitor for medical management of PAD  -Begin xarelto 20mg daily, per vascular surgeon for management of PAD and D/C heparin drip  -D/C lori  -Consult PT/OT  -Smoking cessation; pt is determined to quit at this time  -Pt stable for transfer out of ICU to med-surg  -Will discuss w/ Dr. Healy

## 2024-02-23 NOTE — CASE MANAGEMENT
Case Management Assessment & Discharge Planning Note    Patient name Georgie King  Location ICU 06/ICU 06 MRN [id number]  : 1965 Date 2024       Current Admission Date: 2024  Current Admission Diagnosis:Acute ischemia of Right great toe   Patient Active Problem List    Diagnosis Date Noted    HTN (hypertension) 2024    Acute ischemia of Right great toe 2024    Dyslipidemia 2024    Abnormal CT scan 2024    Morbid obesity (HCC) 2024    PAD (peripheral artery disease) (HCC) 2024    Tobacco abuse 2024    Type 2 diabetes mellitus, without long-term current use of insulin (HCC) 2024      LOS (days): 4  Geometric Mean LOS (GMLOS) (days): 4  Days to GMLOS:-0.1     OBJECTIVE:    Risk of Unplanned Readmission Score: 16.9         Current admission status: Inpatient       Preferred Pharmacy:   [id number] [address] [address]  Phone: [phone] Fax: [phone]    Primary Care Provider: No primary care provider on file.    Primary Insurance: BLUE CROSS  Secondary Insurance:     ASSESSMENT:  Active Health Care Proxies       Lazaro King [facility] Care Representative - Spouse   Primary Phone: [phone] (Mobile)                 Advance Directives  Does patient have a Health Care POA?: No  Was patient offered paperwork?: No  Does patient currently have a Health Care decision maker?: No  Does patient have Advance Directives?: Yes         Readmission Root Cause  30 Day Readmission: No    Patient Information  Admitted from:: Home  Mental Status: Alert  During Assessment patient was accompanied by: Not accompanied during assessment  Assessment information provided by:: Patient  Primary Caregiver: Self  Support Systems: Spouse/significant other  County of Residence: [place]  What city do you live in?: [place]  Type of Current Residence: [address] [place] home  Upon entering residence, is there  a bedroom on the main floor (no further steps)?: Yes  Upon entering residence, is there a bathroom on the main floor (no further steps)?: Yes  Living Arrangements: Lives w/ Spouse/significant other  Is patient a ?: No    Activities of Daily Living Prior to Admission  Functional Status: Independent  Completes ADLs independently?: Yes  Ambulates independently?: Yes  Does patient use assisted devices?: No  Does patient currently own DME?: Yes  What DME does the patient currently own?: Walker  Does patient have a history of Outpatient Therapy (PT/OT)?: No  Does the patient have a history of Short-Term Rehab?: No  Does patient have a history of HHC?: No  Does patient currently have HHC?: No         Patient Information Continued  Income Source: Employed  Does patient have prescription coverage?: Yes  Does patient receive dialysis treatments?: No  Does patient have a history of substance abuse?: No  Does patient have a history of Mental Health Diagnosis?: No         Means of Transportation  Means of Transport to Appts:: Family transport      Social Determinants of Health (SDOH)      Flowsheet Row Most Recent Value   Housing Stability    In the last 12 months, was there a time when you were not able to pay the mortgage or rent on time? N   In the last 12 months, how many places have you lived? 1   In the last 12 months, was there a time when you did not have a steady place to sleep or slept in a shelter (including now)? N   Transportation Needs    In the past 12 months, has lack of transportation kept you from medical appointments or from getting medications? no   In the past 12 months, has lack of transportation kept you from meetings, work, or from getting things needed for daily living? No   Food Insecurity    Within the past 12 months, you worried that your food would run out before you got the money to buy more. Never true   Within the past 12 months, the food you bought just didn't last and you didn't have  money to get more. Never true   Utilities    In the past 12 months has the electric, gas, oil, or water company threatened to shut off services in your home? No            DISCHARGE DETAILS:    Discharge planning discussed with:: patient        CM contacted family/caregiver?: No- see comments (patient is alert)  Were Treatment Team discharge recommendations reviewed with patient/caregiver?: Yes  Did patient/caregiver verbalize understanding of patient care needs?: Yes  Were patient/caregiver advised of the risks associated with not following Treatment Team discharge recommendations?: Yes         Requested Home Health Care         Is the patient interested in HHC at discharge?: No    DME Referral Provided  Referral made for DME?: No    Other Referral/Resources/Interventions Provided:  Interventions: Other (Specify) (TBD)         Treatment Team Recommendation: Other  Discharge Destination Plan:: Other        Additional Comments: RNCM met with patient to discuss discharge planning. Demographics were verified. Patient lives at home with . She denies DME-she has a walker if needed. Denies HH, Denies Rehab. Patient works one day per week at [employer]. Patient was given Xarelto free month card and Xarelto $10 month card. She understands how to use them. Patient states that her  will provide discharge transport home. Dispo-pending surgical plan and therapy recs. CM team following for dc planning.

## 2024-02-23 NOTE — PLAN OF CARE
Problem: CARDIOVASCULAR - ADULT  Goal: Absence of cardiac dysrhythmias or at baseline rhythm  Description: INTERVENTIONS:  - Continuous cardiac monitoring, vital signs, obtain 12 lead EKG if ordered  - Administer antiarrhythmic and heart rate control medications as ordered  - Monitor electrolytes and administer replacement therapy as ordered  Outcome: Progressing     Problem: METABOLIC, FLUID AND ELECTROLYTES - ADULT  Goal: Electrolytes maintained within normal limits  Description: INTERVENTIONS:  - Monitor labs and assess patient for signs and symptoms of electrolyte imbalances  - Administer electrolyte replacement as ordered  - Monitor response to electrolyte replacements, including repeat lab results as appropriate  - Instruct patient on fluid and nutrition as appropriate  Outcome: Progressing  Goal: Fluid balance maintained  Description: INTERVENTIONS:  - Monitor labs   - Monitor I/O and WT  - Instruct patient on fluid and nutrition as appropriate  - Assess for signs & symptoms of volume excess or deficit  Outcome: Progressing  Goal: Glucose maintained within target range  Description: INTERVENTIONS:  - Monitor Blood Glucose as ordered  - Assess for signs and symptoms of hyperglycemia and hypoglycemia  - Administer ordered medications to maintain glucose within target range  - Assess nutritional intake and initiate nutrition service referral as needed  Outcome: Progressing

## 2024-02-23 NOTE — ASSESSMENT & PLAN NOTE
Newly diagnosed on this admission  Echo showed EF 65% with no evidence of intracardiac thrombus  Right great toe ischemia and pain likely in the setting of atherosclerotic plaque embolism from distal infrarenal aorta/SFA.  S/p right embolectomy/thrombectomy 2/21   -5L crystalloid in OR   -ebl 200ml   -uop 350ml              -heparin gtt  Continue ASA, plavix and lipitor for medical management of PAD   Cardiology following to rule out cardioembolic source; recommendations appreciated  Encourage smoking cessation   Pending case management input on insurance coverage on PO anticoagulation

## 2024-02-23 NOTE — PROGRESS NOTES
[facility]  Progress Note  Name: Georgie King I  MRN: [id number]  Unit/Bed#: ICU 06 I Date of Admission: 2/19/2024   Date of Service: 2/23/2024 I Hospital Day: 4    Assessment/Plan   PAD (peripheral artery disease) ([facility])  Assessment & Plan  58 yo female smoker w/ no past medical history presented to [facility] on 2/19/24 w/ R toe pain. Pt was diagnosed w/ distal embolism to R toes likely secondary to atherosclerotic plaque. Pt was seen in consult by cardiology. Echo showed normal EF w/ no evidence of intracardiac thrombus. During her hospital stay, she was newly diagnosed w/ HTN, dyslipidemia, obesity, DM II and severe PAD.    Vascular surgery consulted for R great toe pain. LEAD showed tibioperoneal disease w/ occlusions vs high grade stenoses in the R AT, PT and peroneal arteries, likely secondary to atheroemboli from distal aorta or SFA. CTA abdomen w/ B/L runoff showed noncalcific atherosclerotic plaque involving the distal infrarenal aorta w/ aortic luminal narrowing and R SFA disease. Pt is S/P RLE angiogram in IR on 2/21 w/ an unsuccessful attempt at SFA thrombectomy. Tibial arteries completely thrombosed. Initially successful AT/peroneal thrombectomy rethrombosed without arterial outflow. Attempt at lysis in IR aborted in favor of thrombectomy in OR secondary to rapid worsening of foot. Pt is S/P EVAR, RLE pop/TPT thrombectomy, R SFA stent and PT/AT angioplasty in OR on 2/21.       Diagnostics:  -2/19/24 R great toe xray: Mild soft tissue swelling overlying the first toe with no acute osseous abnormality.   -2/19/24 LEAD: Right: Evidence of diffuse arterial occlusive disease throughout the femoropopliteal segments without focal stenosis. Evidence of tibioperoneal disease with occlusions vs high grade stenoses in the anterior tibial, posterior tibial and peroneal arteries. R EVELIA: 1.39/-/- (poorly compressible vessels). Left: L EVELIA: 1.32/220/110.  -2/19/24 CTA abdomen w/ runoff: Large  "volume of noncalcific atherosclerotic plaque involving the distal infrarenal aorta with aortic luminal narrowing. R SFA disease related to noncalcific plaque. The tibial runoff is poorly evaluated given venous contamination; however, the tibial vessels do appear intact with faint filling of the plantar artery. The dorsalis pedis is not well visualized. No significant left lower extremity arterial disease. Splenic artery occlusion, likely chronic. No evidence of splenic infarct. There is decreased arterial mottling suggestive of decreased perfusion.    Plan:  -R great toe ischemia and pain without sensory/motor deficits  -Likely secondary to atherosclerotic plaque embolism from distal infrarenal aorta/SFA   -S/P EVAR w/ RLE pop/TPT thrombectomy, R SFA stent and PT/AT angioplasty in OR on 2/21  -B/L groin sites soft w/ small areas of ecchymosis, no erythema or drainage, GRAHAM  -Continue plavix and lipitor for medical management of PAD  -Begin xarelto 20mg daily, per vascular surgeon for management of PAD and D/C heparin drip  -D/C sandoval  -Consult PT/OT  -Smoking cessation; pt is determined to quit at this time  -Pt stable for transfer out of ICU to med-surg  -Will discuss w/ Dr. Healy      Subjective:  Pt seen for exam while sitting in her chair; NAD. Pt reports mild tenderness on palpation of B/L groin access sites and severe pain on palpation of R great toe. She denies any abdominal or back pain. Eating and drinking without difficulty. Otherwise, she denies any further complaints at this time.     Vitals:  /63   Pulse 84   Temp 98.4 °F (36.9 °C) (Oral)   Resp (!) 24   Ht 5' 1\" (1.549 m)   Wt 123 kg (270 lb 1 oz)   SpO2 93%   BMI 51.03 kg/m²     I/Os:  I/O last 3 completed shifts:  In: 7539.8 [I.V.:7179.8; NG/GT:60; IV Piggyback:300]  Out: 4495 [Urine:3945; Emesis/NG output:400; Blood:150]  I/O this shift:  In: 837 [P.O.:720; I.V.:117]  Out: 1350 [Urine:1350]    Lab Results and Cultures:   Lab Results "   Component Value Date    WBC 11.43 (H) 02/23/2024    HGB 8.7 (L) 02/23/2024    HCT 28.4 (L) 02/23/2024    MCV 86 02/23/2024     02/23/2024     Lab Results   Component Value Date    GLUCOSE 130 02/21/2024    CALCIUM 7.9 (L) 02/23/2024     03/16/2015    K 3.5 02/23/2024    CO2 23 02/23/2024     (H) 02/23/2024    BUN 7 02/23/2024    CREATININE 0.53 (L) 02/23/2024     Lab Results   Component Value Date    INR 1.24 (H) 02/22/2024    INR 1.13 02/21/2024    PROTIME 15.8 (H) 02/22/2024    PROTIME 14.7 (H) 02/21/2024       Medications:  Current Facility-Administered Medications   Medication Dose Route Frequency    acetaminophen (TYLENOL) tablet 975 mg  975 mg Oral Q8H PRN    amLODIPine (NORVASC) tablet 5 mg  5 mg Oral Daily    atorvastatin (LIPITOR) tablet 40 mg  40 mg Oral Daily With Dinner    ceFAZolin (ANCEF) IVPB (premix in dextrose) 2,000 mg 50 mL  2,000 mg Intravenous Once    clopidogrel (PLAVIX) tablet 75 mg  75 mg Oral Daily    HYDROmorphone (DILAUDID) injection 0.5 mg  0.5 mg Intravenous Q6H PRN    insulin lispro (HumaLOG) 100 units/mL subcutaneous injection 2-12 Units  2-12 Units Subcutaneous 4x Daily (AC & HS)    lisinopril (ZESTRIL) tablet 10 mg  10 mg Oral Daily    naloxone (NARCAN) 0.04 mg/mL syringe 0.04 mg  0.04 mg Intravenous Q1MIN PRN    nicotine (NICODERM CQ) 21 mg/24 hr TD 24 hr patch 1 patch  1 patch Transdermal Daily    ondansetron (ZOFRAN) injection 4 mg  4 mg Intravenous Q6H PRN    ondansetron (ZOFRAN) injection 4 mg  4 mg Intravenous Once PRN    oxyCODONE (ROXICODONE) split tablet 2.5 mg  2.5 mg Oral Q6H PRN    Or    oxyCODONE (ROXICODONE) IR tablet 5 mg  5 mg Oral Q6H PRN    polyethylene glycol (MIRALAX) packet 17 g  17 g Oral Daily PRN    rivaroxaban (XARELTO) tablet 20 mg  20 mg Oral Daily With Dinner       Imaging:  See above    Physical Exam:    General appearance: alert and oriented, in no acute distress  Skin: Skin color, texture, turgor normal. No rashes or  lesions  Neurologic: Grossly normal  Head: Normocephalic, without obvious abnormality, atraumatic  Lungs: clear to auscultation bilaterally  Heart: regular rate and rhythm, S1, S2 normal, no murmur, click, rub or gallop  Abdomen:  obese, soft, non-tender  Extremities:  extremities normal, warm and well-perfused, (+) motor/sensation, ischemic R great toe, 1+ non-pitting RLE edema    Wound/Incision:  B/L groin access sites with small areas of ecchymosis, no erythema, drainage or swelling, sites open to air B/L. R great toe dusky.    Pulse exam:  DP: Right: doppler signal Left: doppler signal  PT: Right: doppler signal Left: doppler signal      KARY Olivo  2/23/2024

## 2024-02-23 NOTE — PROGRESS NOTES
02/23/24 1200   Clinical Encounter Type   Visited With Patient and family together   Routine Visit Follow-up   Continue Visiting Yes

## 2024-02-23 NOTE — ASSESSMENT & PLAN NOTE
Lab Results   Component Value Date    HGBA1C 12.3 (H) 02/19/2024       Recent Labs     02/22/24  1628 02/22/24 2017 02/23/24  0052 02/23/24  0517   POCGLU 131 132 125 118         Blood Sugar Average: Last 72 hrs:  (P) [id number]    Newly diagnosed during this admission  Patient is refusing starting insulin regimen upon discharge despite reviewing risk and benefits of chronic management.    Lantus 18 units Humalog 5 units  SSI, Accu-Cheks ACHS  Consider starting patient on oral antiglycemic agents if patient continues to refuse insulin upon discharge  Outpatient follow-up with ophthalmology

## 2024-02-23 NOTE — PROGRESS NOTES
Pastoral Care Progress Note    2024  Patient: Georgie King : 1965  Admission Date & Time: 2024 0944  MRN: [id number] [facility]: [id number]    Short visit with patient  and , provided listening support will continue to follow as neeed.

## 2024-02-23 NOTE — PROGRESS NOTES
Formerly [facility], 1928–1983  Progress Note  Name: Georgie King I  MRN: [id number]  Unit/Bed#: ICU 06 I Date of Admission: 2/19/2024   Date of Service: 2/23/2024 I Hospital Day: 4    Assessment/Plan   * Acute ischemia of Right great toe  Assessment & Plan  Presented to [facility] on 2/19 due to worsening right great toe pain for the past 2 weeks with associated discoloration.  Patient denies fever or chills.  Podiatry evaluation recommended vascular surgery intervention  Leukocytosis of 12.23.  Thrombocytosis 472.   X-ray great toe 2/19/2024: Mild soft tissue swelling overlying the first toe with no acute osseous abnormality.  LEAD 2/19/2024: Right lower limb: Diffuse arterial occlusive disease throughout femoral-popliteal segments without focal stenosis. Evidence of tibial peritoneal disease with occlusion versus high-grade stenosis in the anterior tibial, posterior tibial, and peroneal arteries. EVELIA of 1.39, however may not be reliable secondary to poor compressible vessels. EVELIA left lower limb: 1.32. Of note, multiple echogenic structures located in the right inguinal region suggestive of enlarged lymphatic channels.  CTA abdomen 2/19/24: noncalcific atherosclerotic plaque involving the distal infrarenal aorta with aortic luminal narrowing. Right superficial femoral artery disease related to nonclcific plaque. Poor evaluation of tibial runoff given venous contamination, however  the tibial vessels do appear intact with faint filling of the plantar artery. No significant left lower extremity arterial disease. Splenic artery occlusion, likely chronic. No evidence of splenic infarct. There is decreased arterial mottling suggestive of decreased perfusion.  Likely secondary to atherosclerotic plaque embolism from distal infrarenal aorta/SFA      S/p RLE embolectomy/ thrombectomy 2/21  See A/P PAD     PAD (peripheral artery disease) (HCC)  Assessment & Plan  Newly diagnosed on this admission  Echo showed EF 65%  with no evidence of intracardiac thrombus  Right great toe ischemia and pain likely in the setting of atherosclerotic plaque embolism from distal infrarenal aorta/SFA.  S/p right embolectomy/thrombectomy 2/21   -5L crystalloid in OR   -ebl 200ml   -uop 350ml              -heparin gtt  Continue ASA, plavix and lipitor for medical management of PAD   Cardiology following to rule out cardioembolic source; recommendations appreciated  Encourage smoking cessation   Pending case management input on insurance coverage on PO anticoagulation    Type 2 diabetes mellitus, without long-term current use of insulin (HCC)  Assessment & Plan  Lab Results   Component Value Date    HGBA1C 12.3 (H) 02/19/2024       Recent Labs     02/22/24  1628 02/22/24  2017 02/23/24  0052 02/23/24  0517   POCGLU 131 132 125 118         Blood Sugar Average: Last 72 hrs:  (P) [id number]    Newly diagnosed during this admission  Patient is refusing starting insulin regimen upon discharge despite reviewing risk and benefits of chronic management.    Lantus 18 units Humalog 5 units  SSI, Accu-Cheks ACHS  Consider starting patient on oral antiglycemic agents if patient continues to refuse insulin upon discharge  Outpatient follow-up with ophthalmology       Dyslipidemia  Assessment & Plan  Newly diagnosed during this admission. Xanthelasmas bilateral eyelids   Cholesterol 162  Triglycerides 204  HDL 43  LDL 78    Atorvastatin 40 mg daily     HTN (hypertension)  Assessment & Plan  Newly diagnosed during this admission  Blood Pressure: 154/66     Norvasc 5 mg daily  Start Lisinopril given uncontrolled blood pressures on one BP regimen and new diagnosis of diabetes  Continuous cardiopulmonary monitoring    Morbid obesity (HCC)  Assessment & Plan  Body mass index is 51.03 kg/m².    Consider nutritionist consult  Encourage weight loss     Abnormal CT scan  Assessment & Plan  CT abdomen 2/19/2024 showed incidental prominent left ovary. Nonemergent  pelvic ultrasound can be obtained if clinically warranted. Nonspecific bilateral inguinal adenopathy, slightly more pronounced on the right. Largest measures 1.2 cm short axis on the right.      Follow up in 1 week with PCP     Tobacco abuse  Assessment & Plan  0.5 PPD    Nicotine replacement therapy  Smoking cessation education            ----------------------------------------------------------------------------------------  HPI/24hr events: No significant overnight events. Blood pressures remain elevated    Patient appropriate for transfer out of the ICU today?: Patient does not meet criteria for ICU Follow-up Clinic; referral has not been made.   Disposition: Transfer to Med-Surg   Code Status: Level 1 - Full Code  ---------------------------------------------------------------------------------------  SUBJECTIVE  Patient sitting in chair comfortable. Reports improvement in pain. Received oxycodone 5 mg last night. No new concerns.    Review of Systems   Constitutional:  Negative for chills and fever.   HENT:  Positive for sore throat (2/2 prior intubation).    Respiratory:  Negative for cough, chest tightness and shortness of breath.    Cardiovascular:  Negative for chest pain and palpitations.   Gastrointestinal:  Negative for abdominal pain, nausea and vomiting.   Genitourinary:  Negative for dysuria.   Musculoskeletal:  Positive for back pain (lower back pain).   Neurological:  Negative for dizziness, weakness, light-headedness and headaches.       ---------------------------------------------------------------------------------------  OBJECTIVE    Vitals   Vitals:    24 0900 24 1000 24 1100 24 1200   BP: 127/65 125/60 111/57 151/51   BP Location:       Pulse: 82 78 84 76   Resp: (!) 31 (!) 28 (!) 35 (!) 28   Temp:   97.9 °F (36.6 °C)    TempSrc:   Oral    SpO2: 95% 97% 96% 97%   Weight:       Height:         Temp (24hrs), Av.2 °F (36.8 °C), Min:97.9 °F (36.6 °C), Max:98.8 °F  (37.1 °C)  Current: Temperature: 97.9 °F (36.6 °C)  Arterial Line BP: 116/50  Arterial Line MAP (mmHg): 74 mmHg    Respiratory:  SpO2: SpO2: 97 %  Nasal Cannula O2 Flow Rate (L/min): 3 L/min    Invasive/non-invasive ventilation settings   Respiratory      Lab Data (Last 4 hours)      None           O2/Vent Data (Last 4 hours)      None                    Physical Exam  Constitutional:       General: She is not in acute distress.     Appearance: Normal appearance. She is obese. She is not ill-appearing.   HENT:      Head: Normocephalic and atraumatic.      Right Ear: External ear normal.      Left Ear: External ear normal.      Nose: Nose normal. No congestion or rhinorrhea.      Mouth/Throat:      Mouth: Mucous membranes are moist.      Pharynx: Oropharynx is clear.   Eyes:      General: No scleral icterus.        Right eye: No discharge.         Left eye: No discharge.      Extraocular Movements: Extraocular movements intact.   Cardiovascular:      Rate and Rhythm: Normal rate and regular rhythm.      Pulses: Normal pulses. Palpable B/L DP/PT     Heart sounds: Normal heart sounds.   Pulmonary:      Effort: Pulmonary effort is normal. No respiratory distress.      Breath sounds: Normal breath sounds.   Chest:      Chest wall: No tenderness.   Abdominal:      General: Bowel sounds are normal.      Palpations: Abdomen is soft.      Tenderness: There is no abdominal tenderness. There is no guarding.   Musculoskeletal:      Cervical back: Normal range of motion.      Right lower leg: No edema.      Left lower leg: No edema.      Comments: Adequate perfusion of right foot.  Bluish/black discoloration of right great toe.   Skin:     Capillary Refill: Capillary refill takes less than 2 seconds.   Neurological:      Mental Status: She is alert and oriented to person, place, and time.            Laboratory and Diagnostics:  Results from last 7 days   Lab Units 02/23/24  0513 02/22/24  1152 02/22/24  0432 02/21/24  9647  02/21/24 1912 02/21/24 1737 02/20/24 0449 02/19/24  1006   WBC Thousand/uL 11.43*  --  14.44* 15.17*  --   --  10.77* 12.23*   HEMOGLOBIN g/dL 8.7* 9.1* 9.0* 10.4*  --   --  12.6 13.3   I STAT HEMOGLOBIN g/dl  --   --   --   --  9.5* 9.9*  --   --    HEMATOCRIT % 28.4*  --  28.7* 32.6*  --   --  40.2 42.6   HEMATOCRIT, ISTAT %  --   --   --   --  28* 29*  --   --    PLATELETS Thousands/uL 316  --  318 373  --   --  442* 472*   NEUTROS PCT % 64  --  68 70  --   --  59 69   MONOS PCT % 8  --  5 5  --   --  7 6   EOS PCT % 2  --  1 1  --   --  2 1     Results from last 7 days   Lab Units 02/23/24  0513 02/22/24 0432 02/21/24 2331 02/21/24 1912 02/21/24 1737 02/20/24 0449 02/19/24  1155   SODIUM mmol/L 140 138 139  139  --   --  138 138   POTASSIUM mmol/L 3.5 3.5 3.8  3.8  --   --  3.9 3.9   CHLORIDE mmol/L 110* 111* 110*  110*  --   --  105 102   CO2 mmol/L 23 23 20*  20*  --   --  23 27   CO2, I-STAT mmol/L  --   --   --  19* 21  --   --    ANION GAP mmol/L 7 4 9  9  --   --  10 9   BUN mg/dL 7 8 9  9  --   --  10 10   CREATININE mg/dL 0.53* 0.55* 0.58*  0.58*  --   --  0.53* 0.62   CALCIUM mg/dL 7.9* 7.2* 7.4*  7.4*  --   --  9.0 9.4   GLUCOSE RANDOM mg/dL 130 130 156*  156*  --   --  193* 223*   ALT U/L  --   --  9  --   --  9 11   AST U/L  --   --  10*  --   --  8* 9*   ALK PHOS U/L  --   --  73  --   --  80 102   ALBUMIN g/dL  --   --  3.1*  --   --  3.7 4.1   TOTAL BILIRUBIN mg/dL  --   --  0.32  --   --  0.29 0.25     Results from last 7 days   Lab Units 02/23/24  0513 02/22/24  0432 02/20/24  0449   MAGNESIUM mg/dL 2.1 1.8* 2.0   PHOSPHORUS mg/dL 3.3 4.3*  --       Results from last 7 days   Lab Units 02/23/24  0858 02/23/24  0053 02/22/24  1805 02/22/24  0848 02/22/24  0231 02/21/24  0453   INR   --   --   --   --  1.24* 1.13   PTT seconds 60* 99* 134* >210* 67*  --               ABG:  Results from last 7 days   Lab Units 02/21/24  2331   PH ART  7.291*   PCO2 ART mm Hg 42.5   PO2 ART mm Hg  "126.5   HCO3 ART mmol/L 20.0*   BASE EXC ART mmol/L -6.2   ABG SOURCE  Line, Arterial     VBG:  Results from last 7 days   Lab Units 02/21/24  2331   ABG SOURCE  Line, Arterial           Intake and Output  I/O         02/21 0701  02/22 0700 02/22 0701  02/23 0700 02/23 0701 02/24 0700    P.O.   240    I.V. (mL/kg) 7546.8 (61.9) 433 (3.5) 19 (0.2)    NG/GT  60     IV Piggyback  300     Total Intake(mL/kg) 7546.8 (61.9) 793 (6.5) 259 (2.1)    Urine (mL/kg/hr) 1005 (0.3) 3100 (1.1)     Emesis/NG output 400      Blood 200      Total Output 1605 3100     Net +5941.8 -2307 +259                   Height and Weights   Height: 5' 1\" (154.9 cm)  IBW (Ideal Body Weight): 47.8 kg  Body mass index is 51.03 kg/m².  Weight (last 2 days)       Date/Time Weight    02/21/24 2300 123 (270.06)              Nutrition       Diet Orders   (From admission, onward)                 Start     Ordered    02/22/24 1236  Diet [place]/CHO Controlled; Consistent Carbohydrate Diet Level 2 (5 carb servings/75 grams CHO/meal)  Diet effective midnight        References:    Adult Nutrition Support Algorithm    RD Therapeutic Diet Order Protocol   Question Answer Comment   Diet Type [place]/CHO Controlled    [place]/CHO Controlled Consistent Carbohydrate Diet Level 2 (5 carb servings/75 grams CHO/meal)    RD to adjust diet per protocol? Yes        02/22/24 1235                      Active Medications  Scheduled Meds:  Current Facility-Administered Medications   Medication Dose Route Frequency Provider Last Rate    acetaminophen  975 mg Oral Q8H PRN Aditya Rosenberg DO      amLODIPine  5 mg Oral Daily Aditya Rosenberg DO      atorvastatin  40 mg Oral Daily With Dinner Aditya Rosenberg DO      clopidogrel  75 mg Oral Daily Aditya Rosenberg DO      heparin (porcine)  3-30 Units/kg/hr (Order-Specific) Intravenous Titrated Aditya Rosenberg DO 10 Units/kg/hr (02/23/24 0216)    heparin (porcine)  4,600 Units Intravenous Q6H PRN Aditya Rosenberg DO "      heparin (porcine)  9,200 Units Intravenous Q6H PRN Aditya Rosenberg, DO      HYDROmorphone  0.5 mg Intravenous Q6H PRN Aditya Rosenberg, DO      insulin lispro  2-12 Units Subcutaneous Q6H [facility] Kacy Avalos PA-C      lisinopril  10 mg Oral Daily Thelma Hobbs MD      naloxone  0.04 mg Intravenous Q1MIN PRN Aditya Rosenberg, DO      nicotine  1 patch Transdermal Daily Aditya Rosenberg, DO      ondansetron  4 mg Intravenous Q6H PRN Aditya Mcdaniel Chet, DO      oxyCODONE  2.5 mg Oral Q6H PRN Aditya Rosenberg, DO      Or    oxyCODONE  5 mg Oral Q6H PRN Aditya Rosenberg, DO      polyethylene glycol  17 g Oral Daily PRN Aditya Rosenberg, DO       Continuous Infusions:  heparin (porcine), 3-30 Units/kg/hr (Order-Specific), Last Rate: 10 Units/kg/hr (02/23/24 0216)      PRN Meds:   acetaminophen, 975 mg, Q8H PRN  heparin (porcine), 4,600 Units, Q6H PRN  heparin (porcine), 9,200 Units, Q6H PRN  HYDROmorphone, 0.5 mg, Q6H PRN  naloxone, 0.04 mg, Q1MIN PRN  ondansetron, 4 mg, Q6H PRN  oxyCODONE, 2.5 mg, Q6H PRN   Or  oxyCODONE, 5 mg, Q6H PRN  polyethylene glycol, 17 g, Daily PRN        Invasive Devices Review  Invasive Devices       Peripherally Inserted Central Catheter Line  Duration             PICC Line 02/21/24 1 day              Peripheral Intravenous Line  Duration             Peripheral IV 02/21/24 Right;Ventral (anterior) Forearm 2 days              Arterial Line  Duration             Arterial Line 02/21/24 Right Radial 1 day              Line  Duration             Arterial Sheath 6 Fr. Left Femoral 2 days                  ---------------------------------------------------------------------------------------  Advance Directive and Living Will:      Power of :    POLST:    ---------------------------------------------------------------------------------------  Care Time Delivered:   Upon my evaluation, this patient had a high probability of imminent or life-threatening  "deterioration due to observation s/p emergent thrombectomy/embolectomy, which required my direct attention, intervention, and personal management.  I have personally provided 35 minutes of critical care time, exclusive of procedures, teaching, family meetings, and any prior time recorded by providers other than myself.       Thelma Hobbs MD      Portions of the record may have been created with voice recognition software.  Occasional wrong word or \"sound a like\" substitutions may have occurred due to the inherent limitations of voice recognition software.  Read the chart carefully and recognize, using context, where substitutions have occurred      "

## 2024-02-23 NOTE — PLAN OF CARE
Problem: CARDIOVASCULAR - ADULT  Goal: Absence of cardiac dysrhythmias or at baseline rhythm  Description: INTERVENTIONS:  - Continuous cardiac monitoring, vital signs, obtain 12 lead EKG if ordered  - Administer antiarrhythmic and heart rate control medications as ordered  - Monitor electrolytes and administer replacement therapy as ordered  Outcome: Progressing     Problem: MUSCULOSKELETAL - ADULT  Goal: Maintain or return mobility to safest level of function  Description: INTERVENTIONS:  - Assess patient's ability to carry out ADLs; assess patient's baseline for ADL function and identify physical deficits which impact ability to perform ADLs (bathing, care of mouth/teeth, toileting, grooming, dressing, etc.)  - Assess/evaluate cause of self-care deficits   - Assess range of motion  - Assess patient's mobility  - Assess patient's need for assistive devices and provide as appropriate  - Encourage maximum independence but intervene and supervise when necessary  - Involve family in performance of ADLs  - Assess for home care needs following discharge   - Consider OT consult to assist with ADL evaluation and planning for discharge  - Provide patient education as appropriate  Outcome: Progressing  Goal: Maintain proper alignment of affected body part  Description: INTERVENTIONS:  - Support, maintain and protect limb and body alignment  - Provide patient/ family with appropriate education  Outcome: Progressing     Problem: METABOLIC, FLUID AND ELECTROLYTES - ADULT  Goal: Electrolytes maintained within normal limits  Description: INTERVENTIONS:  - Monitor labs and assess patient for signs and symptoms of electrolyte imbalances  - Administer electrolyte replacement as ordered  - Monitor response to electrolyte replacements, including repeat lab results as appropriate  - Instruct patient on fluid and nutrition as appropriate  Outcome: Progressing  Goal: Fluid balance maintained  Description: INTERVENTIONS:  - Monitor  labs   - Monitor I/O and WT  - Instruct patient on fluid and nutrition as appropriate  - Assess for signs & symptoms of volume excess or deficit  Outcome: Progressing  Goal: Glucose maintained within target range  Description: INTERVENTIONS:  - Monitor Blood Glucose as ordered  - Assess for signs and symptoms of hyperglycemia and hypoglycemia  - Administer ordered medications to maintain glucose within target range  - Assess nutritional intake and initiate nutrition service referral as needed  Outcome: Progressing     Problem: Prexisting or High Potential for Compromised Skin Integrity  Goal: Skin integrity is maintained or improved  Description: INTERVENTIONS:  - Identify patients at risk for skin breakdown  - Assess and monitor skin integrity  - Assess and monitor nutrition and hydration status  - Monitor labs   - Assess for incontinence   - Turn and reposition patient  - Assist with mobility/ambulation  - Relieve pressure over bony prominences  - Avoid friction and shearing  - Provide appropriate hygiene as needed including keeping skin clean and dry  - Evaluate need for skin moisturizer/barrier cream  - Collaborate with interdisciplinary team   - Patient/family teaching  - Consider wound care consult   Outcome: Progressing

## 2024-02-24 LAB
GLUCOSE SERPL-MCNC: 124 MG/DL (ref 65–140)
GLUCOSE SERPL-MCNC: 127 MG/DL (ref 65–140)
GLUCOSE SERPL-MCNC: 150 MG/DL (ref 65–140)
GLUCOSE SERPL-MCNC: 160 MG/DL (ref 65–140)

## 2024-02-24 PROCEDURE — 97162 PT EVAL MOD COMPLEX 30 MIN: CPT

## 2024-02-24 PROCEDURE — 99232 SBSQ HOSP IP/OBS MODERATE 35: CPT | Performed by: INTERNAL MEDICINE

## 2024-02-24 PROCEDURE — 99232 SBSQ HOSP IP/OBS MODERATE 35: CPT | Performed by: SURGERY

## 2024-02-24 PROCEDURE — 97165 OT EVAL LOW COMPLEX 30 MIN: CPT

## 2024-02-24 PROCEDURE — 82948 REAGENT STRIP/BLOOD GLUCOSE: CPT

## 2024-02-24 RX ADMIN — AMLODIPINE BESYLATE 5 MG: 5 TABLET ORAL at 09:22

## 2024-02-24 RX ADMIN — INSULIN LISPRO 2 UNITS: 100 INJECTION, SOLUTION INTRAVENOUS; SUBCUTANEOUS at 16:50

## 2024-02-24 RX ADMIN — CLOPIDOGREL BISULFATE 75 MG: 75 TABLET ORAL at 09:22

## 2024-02-24 RX ADMIN — OXYCODONE HYDROCHLORIDE 5 MG: 5 TABLET ORAL at 09:21

## 2024-02-24 RX ADMIN — LISINOPRIL 10 MG: 10 TABLET ORAL at 09:22

## 2024-02-24 RX ADMIN — INSULIN LISPRO 2 UNITS: 100 INJECTION, SOLUTION INTRAVENOUS; SUBCUTANEOUS at 07:45

## 2024-02-24 RX ADMIN — OXYCODONE HYDROCHLORIDE 5 MG: 5 TABLET ORAL at 21:19

## 2024-02-24 RX ADMIN — ACETAMINOPHEN 325MG 975 MG: 325 TABLET ORAL at 15:22

## 2024-02-24 RX ADMIN — RIVAROXABAN 20 MG: 20 TABLET, FILM COATED ORAL at 16:51

## 2024-02-24 RX ADMIN — ATORVASTATIN CALCIUM 40 MG: 40 TABLET, FILM COATED ORAL at 16:51

## 2024-02-24 NOTE — PLAN OF CARE
Problem: PAIN - ADULT  Goal: Verbalizes/displays adequate comfort level or baseline comfort level  Description: Interventions:  - Encourage patient to monitor pain and request assistance  - Assess pain using appropriate pain scale  - Administer analgesics based on type and severity of pain and evaluate response  - Implement non-pharmacological measures as appropriate and evaluate response  - Consider cultural and social influences on pain and pain management  - Notify physician/advanced practitioner if interventions unsuccessful or patient reports new pain  Outcome: Progressing      Problem: SKIN/TISSUE INTEGRITY - ADULT  Goal: Incision(s), wounds(s) or drain site(s) healing without S/S of infection  Description: INTERVENTIONS  - Assess and document dressing, incision, wound bed, drain sites and surrounding tissue  - Provide patient and family education  - Perform skin care/dressing changes every prn  Problem: Prexisting or High Potential for Compromised Skin Integrity  Goal: Skin integrity is maintained or improved  Description: INTERVENTIONS:  - Identify patients at risk for skin breakdown  - Assess and monitor skin integrity  - Assess and monitor nutrition and hydration status  - Monitor labs   - Assess for incontinence   - Turn and reposition patient  - Assist with mobility/ambulation  - Relieve pressure over bony prominences  - Avoid friction and shearing  - Provide appropriate hygiene as needed including keeping skin clean and dry  - Evaluate need for skin moisturizer/barrier cream  - Collaborate with interdisciplinary team   - Patient/family teaching  - Consider wound care consult   Outcome: Progressing     Outcome: Progressing

## 2024-02-24 NOTE — OCCUPATIONAL THERAPY NOTE
Occupational Therapy Evaluation     Patient Name: Georgie King  Today's Date: 2/24/2024  Problem List  Principal Problem:    Acute ischemia of Right great toe  Active Problems:    PAD (peripheral artery disease) (HCC)    Tobacco abuse    Type 2 diabetes mellitus, without long-term current use of insulin (HCC)    Abnormal CT scan    Morbid obesity (HCC)    HTN (hypertension)    Dyslipidemia    Past Medical History  History reviewed. No pertinent past medical history.  Past Surgical History  Past Surgical History:   Procedure Laterality Date    APPENDECTOMY      IR LOWER EXTREMITY ANGIOGRAM  2/21/2024    IR LOWER EXTREMITY ANGIOGRAM  2/21/2024    THROMBECTOMY W/ EMBOLECTOMY Right 2/21/2024    Procedure: EMBOLECTOMY/THROMBECTOMY LOWER EXTREMITY Right;  Surgeon: Linwood Hayes MD;  Location: [facility];  Service: Vascular         02/24/24 0837   OT Last Visit   OT Visit Date 02/24/24   Note Type   Note type Evaluation   Pain Assessment   Pain Assessment Tool 0-10   Pain Score No Pain   Restrictions/Precautions   Other Precautions Fall Risk   Home Living   Type of Home House   Home Layout Two level;Able to live on main level with bedroom/bathroom;Bed/bath upstairs   Bathroom Shower/Tub Tub/shower unit   Bathroom Toilet Standard   Home Equipment Walker   Additional Comments Pt with active OT orders. Pt lives with spouse in a two level house with 0 HERSON and FOS to 2nd floor bedroom. Pt reports she can stay on 1st floor if needed upon D/C. Spouse works nightshift, able to assist when home.   Prior Function   Level of [place] Independent with ADLs;Independent with functional mobility;Independent with IADLS   Lives With Spouse   IADLs Independent with driving;Independent with meal prep;Independent with medication management   Vocational Part time employment   Comments At baseline, pt was I w/ ADLs, IADLs, and functional transfers/mobility w/o use of AD. (+) .   Lifestyle   Autonomy At baseline, pt was I  w/ ADLs, IADLs, and functional transfers/mobility w/o use of AD. (+) .   Reciprocal Relationships Spouse   Service to Others PT employed- [employer]   ADL   Where Assessed Edge of bed   Eating Assistance 7  Independent   Grooming Assistance 7  Independent   UB Bathing Assistance 6  Modified Independent   LB Bathing Assistance 6  Modified Independent   UB Dressing Assistance 6  Modified independent   LB Dressing Assistance 6  Modified independent   Toileting Assistance  6  Modified independent   Functional Assistance 6  Modified independent   Bed Mobility   Supine to Sit Unable to assess   Sit to Supine Unable to assess   Additional Comments Pt seated EOB at start/end of session. Call bell and phone within reach. All needs met and pt reports no further questions for OT at this time.   Transfers   Sit to Stand 6  Modified independent   Additional items Increased time required   Stand to Sit 6  Modified independent   Additional items Increased time required   Functional Mobility   Functional Mobility 6  Modified independent   Additional items Rolling walker   Balance   Static Sitting Good   Dynamic Sitting Fair +   Static Standing Fair +   Dynamic Standing Fair   Ambulatory Fair   Activity Tolerance   Activity Tolerance Patient tolerated treatment well   Medical Staff Made Aware Kim, PT   Nurse Made Aware yes; KENNA Sanchez   RUE Assessment   RUE Assessment WFL   LUE Assessment   LUE Assessment WFL   Hand Function   Gross Motor Coordination Functional   Fine Motor Coordination Functional   Sensation   Light Touch No apparent deficits   Proprioception   Proprioception No apparent deficits   Vision-Basic Assessment   Current Vision Wears glasses all the time   Vision - Complex Assessment   Ocular Range of Motion Intact   Acuity Able to read clock/calendar on wall without difficulty;Able to read employee name badge without difficulty   Psychosocial   Psychosocial (WDL) WDL   Perception   Inattention/Neglect Appears  intact   Cognition   Overall Cognitive Status WFL   Arousal/Participation Alert;Cooperative   Attention Within functional limits   Orientation Level Oriented X4   Memory Within functional limits   Following Commands Follows all commands and directions without difficulty   Assessment   Prognosis Good   Assessment Pt is a 59 y.o. female seen for OT evaluation s/p adm to [facility] on 2/19/2024 w/ Ischemia of toe . Pt s/p  EVAR, RLE pop/TPT thrombectomy, R SFA stent and PT/AT angioplasty in OR on 2/21/24. Comorbidities affecting pt’s functional performance include a significant PMH of tobacco abuse. Pt with active OT orders. Pt lives with spouse in a two level house with 0 HERSON and FOS to 2nd floor bedroom. Pt reports she can stay on 1st floor if needed upon D/C. Spouse works nightshift, able to assist when home. At baseline, pt was I w/ ADLs, IADLs, and functional transfers/mobility w/o use of AD. (+) . Upon evaluation, pt currently functioning at a Mod I level for overall ADLs and Mod I for functional mobility/transfers. Pt is functioning near baseline level of performance. Limited ADL deficits. Recommend continued mobilization with hospital staff and restorative program while in the hospital to increase pt’s endurance and strength upon D/C. The patient's raw score on the AM-PAC Daily Activity Inpatient Short Form is 24. A raw score of greater than or equal to 19 suggests the patient may benefit from discharge to home. Please refer to the recommendation of the Occupational Therapist for safe discharge planning. D/C pt from OT caseload at this time.   Plan   OT Frequency Eval only  (D/C OT)   Discharge Recommendation   Rehab Resource Intensity Level, OT No post-acute rehabilitation needs   -PAC Daily Activity Inpatient   Lower Body Dressing 4   Bathing 4   Toileting 4   Upper Body Dressing 4   Grooming 4   Eating 4   Daily Activity Raw Score 24   Daily Activity Standardized Score (Calc for Raw Score  >=11) 57.54   AM-PAC Applied Cognition Inpatient   Following a Speech/Presentation 4   Understanding Ordinary Conversation 4   Taking Medications 4   Remembering Where Things Are Placed or Put Away 4   Remembering List of 4-5 Errands 4   Taking Care of Complicated Tasks 4   Applied Cognition Raw Score 24   Applied Cognition Standardized Score 62.21        Melody Alan, OTR/L

## 2024-02-24 NOTE — ASSESSMENT & PLAN NOTE
The patient has undergone surgical revascularization of her right leg.  She is anticoagulated.  She has been started on appropriate risk factor modification.  She is on nicotine replacement and intends to stop smoking.

## 2024-02-24 NOTE — ASSESSMENT & PLAN NOTE
Newly diagnosed.  A1c 12.3%  The patient is currently on basal bolus insulin therapy.  When her glycemic control is improved, metformin, SGLT2 inhibitors, or GLP-1 agonists should be considered.  Arrangements for glucometer have been made.  The patient intends to establish a primary care relationship when she is discharged.  Will need opthalmology follow up outpt   Nutrition consulted

## 2024-02-24 NOTE — ASSESSMENT & PLAN NOTE
60 yo female smoker, obesity (BMI 51), newly diagnosed DM2, Htn and hyperlipidemia and severe PAD who presented to presented to [facility] on 2/19/24 w/ R toe pain. Pt was diagnosed w/ distal embolism to R toes likely secondary to atherosclerotic plaque. Pt was seen in consult by cardiology. Echo showed normal EF w/ no evidence of intracardiac thrombus.     Vascular surgery consulted for R great toe pain. LEAD showed tibioperoneal disease w/ occlusions vs high grade stenoses in the R AT, PT and peroneal arteries, likely secondary to atheroemboli from distal aorta or SFA. CTA abdomen w/ B/L runoff showed noncalcific atherosclerotic plaque involving the distal infrarenal aorta w/ aortic luminal narrowing and R SFA disease. Pt is S/P RLE angiogram in IR on 2/21 w/ an unsuccessful attempt at SFA thrombectomy. Tibial arteries completely thrombosed. Initially successful AT/peroneal thrombectomy rethrombosed without arterial outflow. Attempt at lysis in IR aborted in favor of thrombectomy in OR secondary to rapid worsening of foot. Pt is S/P EVAR, RLE pop/TPT thrombectomy, R SFA stent and PT/AT angioplasty in OR on 2/21.       Diagnostics:  -2/19/24 R great toe xray: Mild soft tissue swelling overlying the first toe with no acute osseous abnormality.   -2/19/24 LEAD: Right: Evidence of diffuse arterial occlusive disease throughout the femoropopliteal segments without focal stenosis. Evidence of tibioperoneal disease with occlusions vs high grade stenoses in the anterior tibial, posterior tibial and peroneal arteries. R EVELIA: 1.39/-/- (poorly compressible vessels). Left: L EVELIA: 1.32/220/110.  -2/19/24 CTA abdomen w/ runoff: Large volume of noncalcific atherosclerotic plaque involving the distal infrarenal aorta with aortic luminal narrowing. R SFA disease related to noncalcific plaque. The tibial runoff is poorly evaluated given venous contamination; however, the tibial vessels do appear intact with faint filling of the plantar  artery. The dorsalis pedis is not well visualized. No significant left lower extremity arterial disease. Splenic artery occlusion, likely chronic. No evidence of splenic infarct. There is decreased arterial mottling suggestive of decreased perfusion.    Plan:  -Admitted with R great toe ischemia and pain without sensory/motor deficits  -Likely secondary to atherosclerotic plaque embolism from distal infrarenal aorta/SFA   -S/P EVAR w/ RLE pop/TPT thrombectomy, R SFA stent and PT/AT angioplasty in OR on 2/21  -B/L groin sites are stable; soft, small ecchymoses; no erythema or drainage  -Will allow R great toe to demarcate; patient aware that she may lose the toe  -Continue clopidogrel and atorvastatin for medical management of PAD  -Placed on Xarelto 20 as per Dr. Trina Healy on 2/23  -Awaiting PT/OT  -Smoking cessation discussed; does not feel that she needs nicotine replacement as was smoking 5 cigs/day; pt determined to quit  -Will discuss w/ Dr. Adler

## 2024-02-24 NOTE — ASSESSMENT & PLAN NOTE
New diagnosis   The patient is currently on amlodipine 5 mg daily and lisinopril 10 mg daily.  Her blood pressure control has improved.  If intensification of therapy is needed, I would increase lisinopril.

## 2024-02-24 NOTE — PHYSICAL THERAPY NOTE
PHYSICAL THERAPY EVALUATION          Patient Name: Georgie King  Today's Date: 2/24/2024 02/24/24 0836   PT Last Visit   PT Visit Date 02/24/24   Note Type   Note type Evaluation   Pain Assessment   Pain Assessment Tool 0-10   Pain Score No Pain   Restrictions/Precautions   Other Precautions Fall Risk   Home Living   Type of Home House   Home Layout Two level;Able to live on main level with bedroom/bathroom;Bed/bath upstairs   Bathroom Shower/Tub Tub/shower unit   Bathroom Toilet Standard   Home Equipment Walker   Additional Comments 0 HERSNO. could stay on first fl   Prior Function   Level of [place] Independent with functional mobility;Independent with ADLs;Independent with IADLS   Lives With Spouse   IADLs Independent with driving;Independent with meal prep;Independent with medication management   Vocational Part time employment   Comments indep at baseline. spouse works nights   General   Additional Pertinent History pt admitted 2/19/24 for acute ischemia of R great toe now s/p thrombectomy 2/21 and extubation 2/22. PMHx significant for PAD, T2DM, obesity   Cognition   Overall Cognitive Status WFL   Arousal/Participation Cooperative   Orientation Level Oriented X4   Memory Within functional limits   Following Commands Follows all commands and directions without difficulty   Coordination   Sensation WFL   Transfers   Sit to Stand 6  Modified independent   Additional items Other  (RW)   Stand to Sit 6  Modified independent   Additional items Other  (RW)   Ambulation/Elevation   Gait pattern Forward Flexion;Excessively slow   Gait Assistance 6  Modified independent   Assistive Device Rolling walker   Distance about 200'   Stair Management Assistance 6  Modified independent   Stair Management Technique Two rails;Alternating pattern;Foreward   Number of Stairs 5   Ambulation/Elevation Additional Comments  steps   Balance   Static Standing Fair +   Dynamic Standing Fair   Ambulatory Fair    Endurance Deficit   Endurance Deficit No   Activity Tolerance   Activity Tolerance Patient tolerated treatment well   Medical Staff Made Aware Melody OT   Nurse Made Aware Laura RN   Assessment   Prognosis Good   Assessment Georgie King is a 59 y.o. female admitted to [facility] on [facility]24 for Ischemia of toe. POD#3 EVAR w/ RLE pop/TPT thrombectomy, R SFA stent and PT/AT angioplasty. PT was consulted and pt was seen on 2/24/2024 for mobility assessment and d/c planning. Pt presents w high fall risk. At baseline is indep without an AD. Pt is currently functioning at mod I for transfers, ambulation w RW and stair negotiation. Pt demonstrated ability to ambulate community distances and negotiate steps to simulate interior of home environment. Denies concerns w dc home at current LOF. Does not demonstrate need for continued acute PT services; encouraged indep daily mobilization as tolerated. The patient's AM-PAC Basic Mobility Inpatient Short Form Raw Score is 24. A Raw score of greater than 16 suggests the patient may benefit from discharge to home.   Barriers to Discharge None   Plan   PT Frequency   (d/c PT)   Discharge Recommendation   Rehab Resource Intensity Level, PT No post-acute rehabilitation needs   AM-PAC Basic Mobility Inpatient   Turning in Flat Bed Without Bedrails 4   Lying on Back to Sitting on Edge of Flat Bed Without Bedrails 4   Moving Bed to Chair 4   Standing Up From Chair Using Arms 4   Walk in Room 4   Climb 3-5 Stairs With Railing 4   Basic Mobility Inpatient Raw Score 24   Basic Mobility Standardized Score 57.68   Highest Level Of Mobility   JH-HLM Goal 8: Walk 250 feet or more   JH-HLM Achieved 8: Walk 250 feet ot more   End of Consult   Patient Position at End of Consult Seated edge of bed;All needs within reach     History: co - morbidities including age, current experience including fall risk  Exam: impairments in systems including multiple body structures involved; neuromuscular (balance,  gait, transfers, sensation), cognition; am-pac  Clinical: stable/unpredictable  Complexity: moderate    Kim Madrid, PT

## 2024-02-24 NOTE — PROGRESS NOTES
[facility]  Progress Note  Name: Georgie King I  MRN: [id number]  Unit/Bed#: E2 -01 I Date of Admission: 2/19/2024   Date of Service: 2/24/2024 I Hospital Day: 5    Assessment/Plan   * Acute ischemia of Right great toe  Assessment & Plan  The patient underwent surgical revascularization.  She is doing well postop.    PAD (peripheral artery disease) ([facility])  Assessment & Plan  The patient has undergone surgical revascularization of her right leg.  She is anticoagulated.  She has been started on appropriate risk factor modification.  She is on nicotine replacement and intends to stop smoking.    Type 2 diabetes mellitus, without long-term current use of insulin ([facility])  Assessment & Plan  Newly diagnosed.  A1c 12.3%  The patient is currently on basal bolus insulin therapy.  When her glycemic control is improved, metformin, SGLT2 inhibitors, or GLP-1 agonists should be considered.  Arrangements for glucometer have been made.  The patient intends to establish a primary care relationship when she is discharged.  Will need opthalmology follow up outpt   Nutrition consulted       HTN (hypertension)  Assessment & Plan  New diagnosis   The patient is currently on amlodipine 5 mg daily and lisinopril 10 mg daily.  Her blood pressure control has improved.  If intensification of therapy is needed, I would increase lisinopril.    Dyslipidemia  Assessment & Plan  The patient is currently on atorvastatin 40 mg daily.    Tobacco abuse  Assessment & Plan   Nicotine patch   Smoking cessation education     Morbid obesity (HCC)  Assessment & Plan  BMI 48 noted   Nutritionist consulted   Would benefit from weight loss       Abnormal CT scan  Assessment & Plan  Noting prominent left ovary Nonemergent pelvic ultrasound can be obtained if clinically warranted   Incidental note completed  I discussed this with patient and need for outpt follow up - she demonstrated understanding          Subjective:  The  patient is feeling well.  She has minimal foot pain.  She has no chest pain, shortness of breath, abdominal pain, nausea, or vomiting.  She walked well this morning with physical therapy.  She is eating well.    Physical Exam:   Temp:  [97 °F (36.1 °C)-98.5 °F (36.9 °C)] 98.3 °F (36.8 °C)  HR:  [76-89] 89  Resp:  [18-35] 20  BP: (111-168)/(51-82) 138/68  Arterial Line BP: (116-142)/(50-56) 116/50    Gen: Well-developed, obese, in no distress.  Neck: Supple.  No lymphadenopathy, goiter, or bruit.  Heart: Regular rhythm.  I heard no murmur, gallop, or rub.  Lungs: Clear to auscultation and percussion.  No wheezing, rales, or rhonchi.  Abd: Soft with active bowel sounds.  No mass, tenderness, organomegaly.  Extremities: No clubbing, cyanosis, or edema.  There is ecchymosis of the left antecubital area surrounding a PICC catheter.  Neuro: Alert and oriented.  No focal sign.  Skin: Warm and dry.      LABS: No new labs.        VTE Pharmacologic Prophylaxis: Rivaroxaban  VTE Mechanical Prophylaxis: reason for no mechanical VTE prophylaxis therapeutically anticoagulated

## 2024-02-24 NOTE — PROGRESS NOTES
MEDICAL ONCOLOGY-HEMATOLOGY CONSULTATION F/U    REQUESTING PHYSICIAN:   The patient is seen in consultation at the request of Dr. [facility].    REASON FOR REFERRAL:   Low plt ct.    AMENA 1/4/20    The patient is a 65 year old  female with the recent aforementioned medical history and problem list.    The electronic health record was reviewed. The patient was interviewed and evaluated again. Family bedside.    Pt with PMH relevant for CKD, HBV and cirrhosis.    Pt with several week H/O progressive decline in plt ct.    Pt now hosp D#6 hypotension, mentation changes, dehydration, and clinical infection (Klebsiella pneumonia UTI).    Recent Hem Consult request for plt ct <20K.  No assoc bleeding nor bruising.  No recent heparin exposure.  Labs initiated in eval.    LDH normal.  PT and PTT mildly increased.  Fibrinogen OK.  PF4 NEGATIVE.  Await platelet antibody panel.    Plt ct today slightly increased to prior at 22K.    PAST MEDICAL HISTORY, PAST SURGICAL HISTORY, MEDICATIONS, ALLERGIES, FAMILY HISTORY AND SOCIAL HISTORY:   All as reviewed in the electronic health record with interval updates provided by hospital nursing support staff. Reviewed and agree.     REVIEW OF SYSTEMS:   CONSTITUTIONAL: No fevers, chills, night sweats, excessive fatigue or weight loss.   ALLERGIC/IMMUNOLOGIC: No reactions.   EYES: No significant visual difficulties. No diplopia.   EARS, NOSE, MOUTH, THROAT: No problems with hearing, no sore throat, no sinus drainage.   ENDOCRINE: No diabetes, thyroid disease or hormone replacement. No hot flashes or night sweats.   HEMATOLOGIC/LYMPHATIC: No easy bruising or bleeding. The patient denies any tender or palpable lymph nodes.   BREASTS: No abnormal masses of breast, no nipple discharge or pain.   RESPIRATORY: No dyspnea on exertion, chest pain, cough or hemoptysis.   CARDIOVASCULAR: No anginal chest pain, palpitations or orthopnea.   GASTROINTESTINAL: No nausea, vomiting,  [facility]  Progress Note  Name: Georgie King I  MRN: [id number]  Unit/Bed#: E2 -01 I Date of Admission: 2/19/2024   Date of Service: 2/24/2024 I Hospital Day: 5    Assessment/Plan   PAD (peripheral artery disease) ([facility])  Assessment & Plan  60 yo female smoker, obesity (BMI 51), newly diagnosed DM2, Htn and hyperlipidemia and severe PAD who presented to presented to [facility] on 2/19/24 w/ R toe pain. Pt was diagnosed w/ distal embolism to R toes likely secondary to atherosclerotic plaque. Pt was seen in consult by cardiology. Echo showed normal EF w/ no evidence of intracardiac thrombus.     Vascular surgery consulted for R great toe pain. LEAD showed tibioperoneal disease w/ occlusions vs high grade stenoses in the R AT, PT and peroneal arteries, likely secondary to atheroemboli from distal aorta or SFA. CTA abdomen w/ B/L runoff showed noncalcific atherosclerotic plaque involving the distal infrarenal aorta w/ aortic luminal narrowing and R SFA disease. Pt is S/P RLE angiogram in IR on 2/21 w/ an unsuccessful attempt at SFA thrombectomy. Tibial arteries completely thrombosed. Initially successful AT/peroneal thrombectomy rethrombosed without arterial outflow. Attempt at lysis in IR aborted in favor of thrombectomy in OR secondary to rapid worsening of foot. Pt is S/P EVAR, RLE pop/TPT thrombectomy, R SFA stent and PT/AT angioplasty in OR on 2/21.       Diagnostics:  -2/19/24 R great toe xray: Mild soft tissue swelling overlying the first toe with no acute osseous abnormality.   -2/19/24 LEAD: Right: Evidence of diffuse arterial occlusive disease throughout the femoropopliteal segments without focal stenosis. Evidence of tibioperoneal disease with occlusions vs high grade stenoses in the anterior tibial, posterior tibial and peroneal arteries. R EVELIA: 1.39/-/- (poorly compressible vessels). Left: L EVELIA: 1.32/220/110.  -2/19/24 CTA abdomen w/ runoff: Large volume of noncalcific  "atherosclerotic plaque involving the distal infrarenal aorta with aortic luminal narrowing. R SFA disease related to noncalcific plaque. The tibial runoff is poorly evaluated given venous contamination; however, the tibial vessels do appear intact with faint filling of the plantar artery. The dorsalis pedis is not well visualized. No significant left lower extremity arterial disease. Splenic artery occlusion, likely chronic. No evidence of splenic infarct. There is decreased arterial mottling suggestive of decreased perfusion.    Plan:  -Admitted with R great toe ischemia and pain without sensory/motor deficits  -Likely secondary to atherosclerotic plaque embolism from distal infrarenal aorta/SFA   -S/P EVAR w/ RLE pop/TPT thrombectomy, R SFA stent and PT/AT angioplasty in OR on 2/21  -B/L groin sites are stable; soft, small ecchymoses; no erythema or drainage  -Will allow R great toe to demarcate; patient aware that she may lose the toe  -Continue clopidogrel and atorvastatin for medical management of PAD  -Placed on Xarelto 20 as per Dr. Trina Healy on 2/23  -Awaiting PT/OT  -Smoking cessation discussed; does not feel that she needs nicotine replacement as was smoking 5 cigs/day; pt determined to quit  -Will discuss w/ Dr. Adler         Subjective:  R great toe ischemia. No new complaints. OOB and ambulating with walker to the bathroom on her own. No abdominal or leg pain. No problems with bilateral groin sites.     Vitals:  /68 (BP Location: Right arm)   Pulse 89   Temp 98.3 °F (36.8 °C) (Temporal)   Resp 20   Ht 5' 1\" (1.549 m)   Wt 123 kg (270 lb 1 oz)   SpO2 96%   BMI 51.03 kg/m²     I/Os:  I/O last 3 completed shifts:  In: 1550.1 [P.O.:1080; I.V.:470.1]  Out: 3300 [Urine:3300]  No intake/output data recorded.    Lab Results and Cultures:   Lab Results   Component Value Date    WBC 11.43 (H) 02/23/2024    HGB 8.7 (L) 02/23/2024    HCT 28.4 (L) 02/23/2024    MCV 86 02/23/2024     02/23/2024 " diarrhea, gastrointestinal bleeding, or constipation. No change in bowel habits, no heartburn or early satiety.   GENITOURINARY: No abnormal genital masses. No hematuria, hesitancy, incontinence, vaginal bleeding, discharge, or other problems with urination.   MUSCULOSKELETAL: No joint pain, swelling or redness. No decreased range of motion.   INTEGUMENTARY: No chronic rashes, inflammation, ulcerations, or skin changes.   NEUROLOGIC: No headache, blurred vision, and no areas of focal weakness or numbness. Normal gait. No sensory problems.   PSYCHIATRIC: No anxiety, insomnia, depression, eliana, or mood swings. No psychotropic drugs.     PHYSICAL EXAMINATION:     Vital Sign Min/Max (last 24 hours)     Value Min Max   Temp 97.5 °F (36.4 °C) 98.7 °F (37.1 °C)   Pulse 64 76   Resp 16 20   BP: Systolic 135 173   BP: Diastolic 63 78   SpO2 95 % 98 %     CONSTITUTIONAL: Appears close to chronological age. Well-nourished. Well-developed. Resting in bed. Est PS 2.   HEAD: Normocephalic; no scars.   EYES: Conjunctivae and sclerae are clear and without icterus. Pupils are reactive and equal.   EARS, NOSE, MOUTH, THROAT: Sinuses are nontender. No oral exudates, ulcers, masses, thrush or mucositis. Oropharynx clear. Tongue normal.   NECK: Supple without masses or thyromegaly. No jugular venous distention.   HEMATOLOGIC/LYMPHATIC: No petechiae or purpura. No tender or palpable lymph nodes in the cervical, supraclavicular, axillary, or inguinal area.   RESPIRATORY: Lungs are clear to auscultation without rhonchi or wheezing.   CARDIOVASCULAR: Regular rate and rhythm of heart without murmurs, gallops or rubs.   CHEST: Chest is symmetric, without chest wall deformities.   ABDOMEN: Nontender, non-distended, no masses, ascites or hepatosplenomegaly. Good bowel sounds. No guarding or rebound tenderness. No pulsatile masses.   BACK/SPINE: No kyphosis, scoliosis, compression fractures. Nontender to palpation.   MUSCULOSKELETAL: No  "    Lab Results   Component Value Date    GLUCOSE 130 02/21/2024    CALCIUM 7.9 (L) 02/23/2024     03/16/2015    K 3.5 02/23/2024    CO2 23 02/23/2024     (H) 02/23/2024    BUN 7 02/23/2024    CREATININE 0.53 (L) 02/23/2024     Lab Results   Component Value Date    INR 1.24 (H) 02/22/2024    INR 1.13 02/21/2024    PROTIME 15.8 (H) 02/22/2024    PROTIME 14.7 (H) 02/21/2024        Blood Culture: No results found for: \"BLOODCX\",   Urinalysis:   Lab Results   Component Value Date    COLORU Yellow 03/15/2015    CLARITYU Clear 03/15/2015    SPECGRAV >=1.030 03/15/2015    PHUR 6.0 03/15/2015    LEUKOCYTESUR Negative 03/15/2015    NITRITE Negative 03/15/2015    PROTEINUA 30 (1+) (A) 03/15/2015    GLUCOSEU Negative 03/15/2015    KETONESU 15 (1+) (A) 03/15/2015    BILIRUBINUR Negative 03/15/2015    BLOODU Trace (A) 03/15/2015   ,   Urine Culture: No results found for: \"URINECX\",   Wound Culure: No results found for: \"WOUNDCULT\"    Medications:  Current Facility-Administered Medications   Medication Dose Route Frequency    acetaminophen (TYLENOL) tablet 975 mg  975 mg Oral Q8H PRN    amLODIPine (NORVASC) tablet 5 mg  5 mg Oral Daily    atorvastatin (LIPITOR) tablet 40 mg  40 mg Oral Daily With Dinner    ceFAZolin (ANCEF) IVPB (premix in dextrose) 2,000 mg 50 mL  2,000 mg Intravenous Once    clopidogrel (PLAVIX) tablet 75 mg  75 mg Oral Daily    insulin lispro (HumaLOG) 100 units/mL subcutaneous injection 2-12 Units  2-12 Units Subcutaneous 4x Daily (AC & HS)    lisinopril (ZESTRIL) tablet 10 mg  10 mg Oral Daily    naloxone (NARCAN) 0.04 mg/mL syringe 0.04 mg  0.04 mg Intravenous Q1MIN PRN    nicotine (NICODERM CQ) 21 mg/24 hr TD 24 hr patch 1 patch  1 patch Transdermal Daily    ondansetron (ZOFRAN) injection 4 mg  4 mg Intravenous Q6H PRN    oxyCODONE (ROXICODONE) split tablet 2.5 mg  2.5 mg Oral Q6H PRN    Or    oxyCODONE (ROXICODONE) IR tablet 5 mg  5 mg Oral Q6H PRN    polyethylene glycol (MIRALAX) packet 17 g  " tenderness or swelling, normal range of motion without obvious weakness.   EXTREMITIES: No visible deformities, no cyanosis, clubbing or edema. Pulses equal bilaterally.   INTEGUMENTARY: No rashes, scars, or lesions suggestive of malignancy.   NEUROLOGIC: No sensory or motor deficits, normal cerebellar function, cranial nerves intact.   PSYCHIATRIC: No anxiety.     Lab and Imaging studies were reviewed in the electronic health record. Reviewed and agree.     WBC (K/mcL)   Date Value   01/04/2020 10.1     RBC (mil/mcL)   Date Value   01/04/2020 3.28 (L)     HCT (%)   Date Value   01/04/2020 29.2 (L)     HGB (g/dL)   Date Value   01/04/2020 9.6 (L)     PLT (K/mcL)   Date Value   01/04/2020 22 (LL)     IMPRESSION AND PLAN:     1. Thrombocytopenia. Acute on subacute over recent weeks. Occurring in pt admitted for UTI and clinical urosepsis. Pt also with co-morbid HBV, cirrhosis and CKD. Labs reviewed.     Clinically improving plt ct (>20K) in infectious mgmnt. Favor as shortened plt survival time in setting of acute infection and cirrhosis.    Await plt antibody profile, ITP not excluded (doubt).    2. Mildly increased PT and PTT. Fibrinogen OK, DIC not likely. Favor as related to factor deficiency state(s), potentially related to nutritional issues and recent abx mgmnt. Will assess with mixing studies.    Approximately 30 minutes were spent in evaluation of the patient. Approx 50% of this time was spent in face-to-face evaluation of the patient regarding her hematologic/oncologic issues, recommendation in care and follow-up planning.    NORY MADRID MD     17 g Oral Daily PRN    rivaroxaban (XARELTO) tablet 20 mg  20 mg Oral Daily With Dinner       Imaging:  No new imaging    Physical Exam:    General appearance: alert and oriented, in no acute distress  Head: Normocephalic, without obvious abnormality, atraumatic  Eyes: PERRLE, EOMI, Non-icteric.  Neck: thick;  no carotid bruit, no JVD, and supple, symmetrical, trachea midline  Lungs: clear to auscultation bilaterally  Heart: regular rate and rhythm, S1, S2 normal, no murmur, click, rub or gallop  Abdomen: Obese. + moderate ecchymoses over the abdomen (?a/c); soft, non-tender; bowel sounds normal; no masses,  no organomegaly  Extremities:       Entire R great toe is ischemic with mild erythema at the base of the toe. Remaining toes intact. Foot warm and well-perfused. Monophasic DP/PT signals with signal down to the toe.     L foot warm and well-perfused. Good biphasic DP signal    B groin sites:   R groin mild ecchymoses, soft, no H/T    L groin with moderate ecchymoses, soft with very small H/T.    Pulse exam:  Radial: Right: 2+ Left:: 2+  DP: Right: doppler signal Left: doppler signal  PT: Right: doppler signal Left: doppler signal      Bernadette Alarcon PA-C  2/24/2024  [facility]

## 2024-02-25 LAB
ABO GROUP BLD BPU: NORMAL
ABO GROUP BLD BPU: NORMAL
BPU ID: NORMAL
BPU ID: NORMAL
CROSSMATCH: NORMAL
CROSSMATCH: NORMAL
GLUCOSE SERPL-MCNC: 144 MG/DL (ref 65–140)
GLUCOSE SERPL-MCNC: 147 MG/DL (ref 65–140)
GLUCOSE SERPL-MCNC: 149 MG/DL (ref 65–140)
GLUCOSE SERPL-MCNC: 158 MG/DL (ref 65–140)
UNIT DISPENSE STATUS: NORMAL
UNIT DISPENSE STATUS: NORMAL
UNIT PRODUCT CODE: NORMAL
UNIT PRODUCT CODE: NORMAL
UNIT PRODUCT VOLUME: 350 ML
UNIT PRODUCT VOLUME: 350 ML
UNIT RH: NORMAL
UNIT RH: NORMAL

## 2024-02-25 PROCEDURE — 99232 SBSQ HOSP IP/OBS MODERATE 35: CPT | Performed by: PHYSICIAN ASSISTANT

## 2024-02-25 PROCEDURE — 82948 REAGENT STRIP/BLOOD GLUCOSE: CPT

## 2024-02-25 RX ORDER — LANCETS 33 GAUGE
EACH MISCELLANEOUS
Qty: 200 EACH | Refills: 0 | Status: SHIPPED | OUTPATIENT
Start: 2024-02-25 | End: 2024-02-25

## 2024-02-25 RX ORDER — LISINOPRIL 10 MG/1
10 TABLET ORAL DAILY
Qty: 30 TABLET | Refills: 0 | Status: SHIPPED | OUTPATIENT
Start: 2024-02-26 | End: 2024-02-25

## 2024-02-25 RX ORDER — CLOPIDOGREL BISULFATE 75 MG/1
75 TABLET ORAL DAILY
Qty: 30 TABLET | Refills: 0 | Status: SHIPPED | OUTPATIENT
Start: 2024-02-25 | End: 2024-02-25

## 2024-02-25 RX ORDER — AMLODIPINE BESYLATE 5 MG/1
5 TABLET ORAL DAILY
Qty: 30 TABLET | Refills: 0 | Status: SHIPPED | OUTPATIENT
Start: 2024-02-25 | End: 2024-02-25

## 2024-02-25 RX ORDER — GLIMEPIRIDE 1 MG/1
1 TABLET ORAL
Qty: 30 TABLET | Refills: 0 | Status: SHIPPED | OUTPATIENT
Start: 2024-02-25 | End: 2024-02-26

## 2024-02-25 RX ORDER — BLOOD-GLUCOSE METER
KIT MISCELLANEOUS
Qty: 1 KIT | Refills: 0 | Status: SHIPPED | OUTPATIENT
Start: 2024-02-25

## 2024-02-25 RX ORDER — LISINOPRIL 10 MG/1
10 TABLET ORAL DAILY
Qty: 30 TABLET | Refills: 0 | Status: SHIPPED | OUTPATIENT
Start: 2024-02-26

## 2024-02-25 RX ORDER — GLUCOSAMINE HCL/CHONDROITIN SU 500-400 MG
CAPSULE ORAL
Qty: 200 EACH | Refills: 0 | Status: SHIPPED | OUTPATIENT
Start: 2024-02-25 | End: 2024-02-25

## 2024-02-25 RX ORDER — GLUCOSAMINE HCL/CHONDROITIN SU 500-400 MG
CAPSULE ORAL
Qty: 200 EACH | Refills: 0 | Status: SHIPPED | OUTPATIENT
Start: 2024-02-25

## 2024-02-25 RX ORDER — CLOPIDOGREL BISULFATE 75 MG/1
75 TABLET ORAL DAILY
Qty: 30 TABLET | Refills: 0 | Status: SHIPPED | OUTPATIENT
Start: 2024-02-25

## 2024-02-25 RX ORDER — BLOOD SUGAR DIAGNOSTIC
STRIP MISCELLANEOUS
Qty: 200 EACH | Refills: 0 | Status: SHIPPED | OUTPATIENT
Start: 2024-02-25

## 2024-02-25 RX ORDER — AMLODIPINE BESYLATE 5 MG/1
5 TABLET ORAL DAILY
Qty: 30 TABLET | Refills: 0 | Status: SHIPPED | OUTPATIENT
Start: 2024-02-25

## 2024-02-25 RX ORDER — INSULIN GLARGINE 100 [IU]/ML
8 INJECTION, SOLUTION SUBCUTANEOUS EVERY MORNING
Status: DISCONTINUED | OUTPATIENT
Start: 2024-02-25 | End: 2024-02-26 | Stop reason: HOSPADM

## 2024-02-25 RX ORDER — ATORVASTATIN CALCIUM 40 MG/1
40 TABLET, FILM COATED ORAL
Qty: 30 TABLET | Refills: 0 | Status: SHIPPED | OUTPATIENT
Start: 2024-02-25

## 2024-02-25 RX ORDER — BLOOD SUGAR DIAGNOSTIC
STRIP MISCELLANEOUS
Qty: 200 EACH | Refills: 0 | Status: SHIPPED | OUTPATIENT
Start: 2024-02-25 | End: 2024-02-25

## 2024-02-25 RX ORDER — ATORVASTATIN CALCIUM 40 MG/1
40 TABLET, FILM COATED ORAL
Qty: 30 TABLET | Refills: 0 | Status: SHIPPED | OUTPATIENT
Start: 2024-02-25 | End: 2024-02-25

## 2024-02-25 RX ORDER — BLOOD-GLUCOSE METER
KIT MISCELLANEOUS
Qty: 1 KIT | Refills: 0 | Status: SHIPPED | OUTPATIENT
Start: 2024-02-25 | End: 2024-02-25

## 2024-02-25 RX ORDER — LANCETS 33 GAUGE
EACH MISCELLANEOUS
Qty: 200 EACH | Refills: 0 | Status: SHIPPED | OUTPATIENT
Start: 2024-02-25

## 2024-02-25 RX ADMIN — INSULIN GLARGINE 8 UNITS: 100 INJECTION, SOLUTION SUBCUTANEOUS at 10:39

## 2024-02-25 RX ADMIN — ACETAMINOPHEN 325MG 975 MG: 325 TABLET ORAL at 19:35

## 2024-02-25 RX ADMIN — AMLODIPINE BESYLATE 5 MG: 5 TABLET ORAL at 10:13

## 2024-02-25 RX ADMIN — ATORVASTATIN CALCIUM 40 MG: 40 TABLET, FILM COATED ORAL at 16:06

## 2024-02-25 RX ADMIN — INSULIN LISPRO 2 UNITS: 100 INJECTION, SOLUTION INTRAVENOUS; SUBCUTANEOUS at 21:49

## 2024-02-25 RX ADMIN — CLOPIDOGREL BISULFATE 75 MG: 75 TABLET ORAL at 10:13

## 2024-02-25 RX ADMIN — LISINOPRIL 10 MG: 10 TABLET ORAL at 10:13

## 2024-02-25 RX ADMIN — RIVAROXABAN 20 MG: 20 TABLET, FILM COATED ORAL at 16:06

## 2024-02-25 NOTE — ASSESSMENT & PLAN NOTE
The patient underwent surgical revascularization with vascular surgery  with EVAR on 2/21 in OR   She is now on xarelto, plavix and satin   Smoking cessation   Will need outpt podiatry and vascular follow up   Cleared by Vascular surgery

## 2024-02-25 NOTE — ASSESSMENT & PLAN NOTE
Newly diagnosed.  A1c 12.3%  Refusing any injectable or insulin despite education   Will send on amaryl and metformin with adjustments with pcp as warranted   Repeat A1c in 3-4 months   Glucometer sent to [facility] to continue checking accuchecks and keeping blood glucose log   The patient intends to establish a primary care relationship when she is discharged.and referral to endo will be placed   Will need opthalmology follow up outpt for eye exam   Nutrition consulted here  Extensive education given on the importance of glycemic control including and lifestyle modifications including diet, weight loss and exercise to prevent ongoing complications from diabetes

## 2024-02-25 NOTE — PROGRESS NOTES
[facility]  Progress Note  Name: Georgie King I  MRN: [id number]  Unit/Bed#: E2 -01 I Date of Admission: 2/19/2024   Date of Service: 2/25/2024 I Hospital Day: 6    Assessment/Plan   PAD (peripheral artery disease) (HCC)  Assessment & Plan  patient has undergone surgical revascularization of her right leg by vascular surgery on 2/21 with right thrombectomy/embolectomy   She is now on Xarelto and plavix, statin   She is on nicotine replacement and intends to stop smoking   Management per vascular surgery   still as high risk for needing toe amp and pt is aware of this, giving time for toe to demarcate    * Acute ischemia of Right great toe  Assessment & Plan  The patient underwent surgical revascularization with vascular surgery  with EVAR on 2/21 in OR   She is now on xarelto, plavix and satin   PT and OT   Smoking cessation   Will need outpt podiatry and vascular follow up   Check labs in AM   Discharge planning once cleared by vascular team       Dyslipidemia  Assessment & Plan  Continue atorvastatin 40 mg daily.    HTN (hypertension)  Assessment & Plan  New diagnosis   The patient is currently on amlodipine 5 mg daily and lisinopril 10 mg daily.  Her blood pressure control has improved   Monitor VS     Morbid obesity (HCC)  Assessment & Plan  BMI 48 noted   Nutritionist consulted   Would benefit from weight loss       Abnormal CT scan  Assessment & Plan  Noting prominent left ovary Nonemergent pelvic ultrasound can be obtained if clinically warranted   Incidental note completed  I discussed this with patient and need for outpt follow up - she demonstrated understanding     Type 2 diabetes mellitus, without long-term current use of insulin (HCC)  Assessment & Plan  Newly diagnosed.  A1c 12.3%  Currently on lantus 8 units qAM   When her glycemic control is improved, metformin, SGLT2 inhibitors, or GLP-1 agonists should be considered. She is refusing outpt insulin   Glucometer sent  to pharamacy already   The patient intends to establish a primary care relationship when she is discharged.and referral to endo   Will need opthalmology follow up outpt   Nutrition consulted       Tobacco abuse  Assessment & Plan   Nicotine patch   Smoking cessation education              VTE Pharmacologic Prophylaxis:   Moderate Risk (Score 3-4) - Pharmacological DVT Prophylaxis Ordered: rivaroxaban (Xarelto).    Mobility:   Basic Mobility Inpatient Raw Score: 24  JH-HLM Goal: 8: Walk 250 feet or more  JH-HLM Achieved: 7: Walk 25 feet or more  HLM Goal achieved. Continue to encourage appropriate mobility.    Patient Centered Rounds: I performed bedside rounds with nursing staff today.   Discussions with Specialists or Other Care Team Provider:     Education and Discussions with Family / Patient: Patient declined call to .     Total Time Spent on Date of Encounter in care of patient:  mins. This time was spent on one or more of the following: performing physical exam; counseling and coordination of care; obtaining or reviewing history; documenting in the medical record; reviewing/ordering tests, medications or procedures; communicating with other healthcare professionals and discussing with patient's family/caregivers.    Current Length of Stay: 6 day(s)  Current Patient Status: Inpatient   Certification Statement: The patient will continue to require additional inpatient hospital stay due to PAD  Discharge Plan: Anticipate discharge tomorrow to home.    Code Status: Level 1 - Full Code    Subjective:   Patient doing well. Pain controlle dto right foot. No bleeding. Bruising to groin stable. No urinary complaints, abdo pain, N/V/D. No fevers, no chest pain or SOB.     Objective:     Vitals:   Temp (24hrs), Av.4 °F (36.9 °C), Min:97.7 °F (36.5 °C), Max:99.4 °F (37.4 °C)    Temp:  [97.7 °F (36.5 °C)-99.4 °F (37.4 °C)] 97.7 °F (36.5 °C)  HR:  [74-85] 84  Resp:  [17-20] 17  BP: (102-141)/(56-72)  128/58  SpO2:  [95 %-99 %] 97 %  Body mass index is 51.03 kg/m².     Input and Output Summary (last 24 hours):     Intake/Output Summary (Last 24 hours) at 2/25/2024 1003  Last data filed at 2/24/2024 1700  Gross per 24 hour   Intake 540 ml   Output --   Net 540 ml       Physical Exam:   Physical Exam  Vitals and nursing note reviewed.   Constitutional:       Appearance: She is obese.   Cardiovascular:      Rate and Rhythm: Normal rate and regular rhythm.   Pulmonary:      Effort: Pulmonary effort is normal.      Breath sounds: Normal breath sounds.   Abdominal:      General: Bowel sounds are normal.      Palpations: Abdomen is soft.   Skin:     Comments: Bruising to pubic area and groin b/l , no fluctuance non tender , right great toe blue in color with bullae to top of right great toe   Neurological:      Mental Status: She is alert. Mental status is at baseline.   Psychiatric:         Mood and Affect: Mood normal.          Additional Data:     Labs:  Results from last 7 days   Lab Units 02/23/24  0513   WBC Thousand/uL 11.43*   HEMOGLOBIN g/dL 8.7*   HEMATOCRIT % 28.4*   PLATELETS Thousands/uL 316   NEUTROS PCT % 64   LYMPHS PCT % 26   MONOS PCT % 8   EOS PCT % 2     Results from last 7 days   Lab Units 02/23/24  0513 02/22/24  0432 02/21/24  2331   SODIUM mmol/L 140   < > 139  139   POTASSIUM mmol/L 3.5   < > 3.8  3.8   CHLORIDE mmol/L 110*   < > 110*  110*   CO2 mmol/L 23   < > 20*  20*   BUN mg/dL 7   < > 9  9   CREATININE mg/dL 0.53*   < > 0.58*  0.58*   ANION GAP mmol/L 7   < > 9  9   CALCIUM mg/dL 7.9*   < > 7.4*  7.4*   ALBUMIN g/dL  --   --  3.1*   TOTAL BILIRUBIN mg/dL  --   --  0.32   ALK PHOS U/L  --   --  73   ALT U/L  --   --  9   AST U/L  --   --  10*   GLUCOSE RANDOM mg/dL 130   < > 156*  156*    < > = values in this interval not displayed.     Results from last 7 days   Lab Units 02/22/24  0231   INR  1.24*     Results from last 7 days   Lab Units 02/25/24  0601 02/24/24  7124  02/24/24  1514 02/24/24  1135 02/24/24  0743 02/23/24  2107 02/23/24  1554 02/23/24  1138 02/23/24  0738 02/23/24  0517 02/23/24  0052 02/22/24 2017   POC GLUCOSE mg/dl 144* 127 160* 124 150* 208* 123 156* 123 118 125 132     Results from last 7 days   Lab Units 02/19/24  1006   HEMOGLOBIN A1C % 12.3*           Lines/Drains:  Invasive Devices       Peripherally Inserted Central Catheter Line  Duration             PICC Line 02/21/24 3 days              Line  Duration             Arterial Sheath 6 Fr. Left Femoral 3 days                    Central Line:  Goal for removal:  d/c prior to discharge              Recent Cultures (last 7 days):         Last 24 Hours Medication List:   Current Facility-Administered Medications   Medication Dose Route Frequency Provider Last Rate    acetaminophen  975 mg Oral Q8H PRN Thelma Hobbs MD      amLODIPine  5 mg Oral Daily Thelma Hobbs MD      atorvastatin  40 mg Oral Daily With Dinner Thelma Hobbs MD      cefazolin  2,000 mg Intravenous Once Ramirez Connor PA-C      clopidogrel  75 mg Oral Daily Thelma Hobbs MD      insulin glargine  8 Units Subcutaneous QAM Kelly Martin PA-C      insulin lispro  2-12 Units Subcutaneous 4x Daily (AC & HS) Ramirez Connor PA-C      lisinopril  10 mg Oral Daily Thelma Hobbs MD      naloxone  0.04 mg Intravenous Q1MIN PRN Thelma Hobbs MD      nicotine  1 patch Transdermal Daily Thelma Hobbs MD      ondansetron  4 mg Intravenous Q6H PRN Thelma Hobbs MD      oxyCODONE  2.5 mg Oral Q6H PRN Thelma Hobbs MD      Or    oxyCODONE  5 mg Oral Q6H PRN Thelma Hobbs MD      polyethylene glycol  17 g Oral Daily PRN Thelma Hobbs MD      rivaroxaban  20 mg Oral Daily With Dinner Ramirez Connor PA-C          Today, Patient Was Seen By: Kelly Martin PA-C    **Please Note: This note may have been constructed using a voice recognition system.**

## 2024-02-25 NOTE — PLAN OF CARE
Problem: PAIN - ADULT  Goal: Verbalizes/displays adequate comfort level or baseline comfort level  Description: Interventions:  - Encourage patient to monitor pain and request assistance  - Assess pain using appropriate pain scale  - Administer analgesics based on type and severity of pain and evaluate response  - Implement non-pharmacological measures as appropriate and evaluate response  - Consider cultural and social influences on pain and pain management  - Notify physician/advanced practitioner if interventions unsuccessful or patient reports new pain  Outcome: Progressing     Problem: SAFETY ADULT  Goal: Patient will remain free of falls  Description: INTERVENTIONS:  - Educate patient/family on patient safety including physical limitations  - Instruct patient to call for assistance with activity   - Consult OT/PT to assist with strengthening/mobility   - Keep Call bell within reach  - Keep bed low and locked with side rails adjusted as appropriate  - Keep care items and personal belongings within reach  - Initiate and maintain comfort rounds  - Make Fall Risk Sign visible to staff  - Offer Toileting every 2 Hours, in advance of need  - Initiate/Maintain bed alarm  - Obtain necessary fall risk management equipment: gripper socks  - Apply yellow socks and bracelet for high fall risk patients  - Consider moving patient to room near nurses station  Outcome: Progressing  Goal: Maintain or return to baseline ADL function  Description: INTERVENTIONS:  -  Assess patient's ability to carry out ADLs; assess patient's baseline for ADL function and identify physical deficits which impact ability to perform ADLs (bathing, care of mouth/teeth, toileting, grooming, dressing, etc.)  - Assess/evaluate cause of self-care deficits   - Assess range of motion  - Assess patient's mobility; develop plan if impaired  - Assess patient's need for assistive devices and provide as appropriate  - Encourage maximum independence but  intervene and supervise when necessary  - Involve family in performance of ADLs  - Assess for home care needs following discharge   - Consider OT consult to assist with ADL evaluation and planning for discharge  - Provide patient education as appropriate  Outcome: Progressing  Goal: Maintains/Returns to pre admission functional level  Description: INTERVENTIONS:  - Perform AM-PAC 6 Click Basic Mobility/ Daily Activity assessment daily.  - Set and communicate daily mobility goal to care team and patient/family/caregiver.   - Collaborate with rehabilitation services on mobility goals if consulted  - Perform Range of Motion 3 times a day.  - Reposition patient every 2 hours.  - Dangle patient 3 times a day  - Stand patient 3 times a day  - Ambulate patient 3 times a day  - Out of bed to chair 3 times a day   - Out of bed for meals 3 times a day  - Out of bed for toileting  - Record patient progress and toleration of activity level   Outcome: Progressing     Problem: DISCHARGE PLANNING  Goal: Discharge to home or other facility with appropriate resources  Description: INTERVENTIONS:  - Identify barriers to discharge w/patient and caregiver  - Arrange for needed discharge resources and transportation as appropriate  - Identify discharge learning needs (meds, wound care, etc.)  - Arrange for interpretive services to assist at discharge as needed  - Refer to Case Management Department for coordinating discharge planning if the patient needs post-hospital services based on physician/advanced practitioner order or complex needs related to functional status, cognitive ability, or social support system  Outcome: Progressing     Problem: Knowledge Deficit  Goal: Patient/family/caregiver demonstrates understanding of disease process, treatment plan, medications, and discharge instructions  Description: Complete learning assessment and assess knowledge base.  Interventions:  - Provide teaching at level of understanding  - Provide  teaching via preferred learning methods  Outcome: Progressing     Problem: CARDIOVASCULAR - ADULT  Goal: Absence of cardiac dysrhythmias or at baseline rhythm  Description: INTERVENTIONS:  - Continuous cardiac monitoring, vital signs, obtain 12 lead EKG if ordered  - Administer antiarrhythmic and heart rate control medications as ordered  - Monitor electrolytes and administer replacement therapy as ordered  Outcome: Progressing     Problem: SKIN/TISSUE INTEGRITY - ADULT  Goal: Skin Integrity remains intact(Skin Breakdown Prevention)  Description: Assess:  -Perform Marty assessment every shift  -Clean and moisturize skin every shift  -Inspect skin when repositioning, toileting, and assisting with ADLS  -Assess under medical devices such as SCDs every shift  -Assess extremities for adequate circulation and sensation     Bed Management:  -Have minimal linens on bed & keep smooth, unwrinkled  -Change linens as needed when moist or perspiring  -Avoid sitting or lying in one position for more than 3 hours while in bed  -Keep HOB at 35 degrees     Toileting:  -Offer bedside commode    Activity:  -Mobilize patient 6 times a day  -Encourage activity and walks on unit  -Encourage or provide ROM exercises   -Turn and reposition patient every 2 Hours  -Use appropriate equipment to lift or move patient in bed  -Instruct/ Assist with weight shifting every 2 hours when out of bed in chair  -Consider limitation of chair time 4 hour intervals    Skin Care:  -Avoid use of baby powder, tape, friction and shearing, hot water or constrictive clothing  -Relieve pressure over bony prominences using weight shifting  -Do not massage red bony areas    Next Steps:  -Teach patient strategies to minimize risks such as pressure injuries   -Consider consults to  interdisciplinary teams such as dermatology  Outcome: Progressing  Goal: Incision(s), wounds(s) or drain site(s) healing without S/S of infection  Description: INTERVENTIONS  - Assess  and document dressing, incision, wound bed, drain sites and surrounding tissue  - Provide patient and family education  - Perform skin care/dressing changes every shift  Outcome: Progressing  Goal: Pressure injury heals and does not worsen  Description: Interventions:  - Implement low air loss mattress or specialty surface (Criteria met)  - Apply silicone foam dressing  - Consider nutrition services referral as needed  Outcome: Progressing     Problem: MUSCULOSKELETAL - ADULT  Goal: Maintain or return mobility to safest level of function  Description: INTERVENTIONS:  - Assess patient's ability to carry out ADLs; assess patient's baseline for ADL function and identify physical deficits which impact ability to perform ADLs (bathing, care of mouth/teeth, toileting, grooming, dressing, etc.)  - Assess/evaluate cause of self-care deficits   - Assess range of motion  - Assess patient's mobility  - Assess patient's need for assistive devices and provide as appropriate  - Encourage maximum independence but intervene and supervise when necessary  - Involve family in performance of ADLs  - Assess for home care needs following discharge   - Consider OT consult to assist with ADL evaluation and planning for discharge  - Provide patient education as appropriate  Outcome: Progressing  Goal: Maintain proper alignment of affected body part  Description: INTERVENTIONS:  - Support, maintain and protect limb and body alignment  - Provide patient/ family with appropriate education  Outcome: Progressing     Problem: METABOLIC, FLUID AND ELECTROLYTES - ADULT  Goal: Electrolytes maintained within normal limits  Description: INTERVENTIONS:  - Monitor labs and assess patient for signs and symptoms of electrolyte imbalances  - Administer electrolyte replacement as ordered  - Monitor response to electrolyte replacements, including repeat lab results as appropriate  - Instruct patient on fluid and nutrition as appropriate  Outcome:  Progressing  Goal: Fluid balance maintained  Description: INTERVENTIONS:  - Monitor labs   - Monitor I/O and WT  - Instruct patient on fluid and nutrition as appropriate  - Assess for signs & symptoms of volume excess or deficit  Outcome: Progressing  Goal: Glucose maintained within target range  Description: INTERVENTIONS:  - Monitor Blood Glucose as ordered  - Assess for signs and symptoms of hyperglycemia and hypoglycemia  - Administer ordered medications to maintain glucose within target range  - Assess nutritional intake and initiate nutrition service referral as needed  Outcome: Progressing     Problem: Prexisting or High Potential for Compromised Skin Integrity  Goal: Skin integrity is maintained or improved  Description: INTERVENTIONS:  - Identify patients at risk for skin breakdown  - Assess and monitor skin integrity  - Assess and monitor nutrition and hydration status  - Monitor labs   - Assess for incontinence   - Turn and reposition patient  - Assist with mobility/ambulation  - Relieve pressure over bony prominences  - Avoid friction and shearing  - Provide appropriate hygiene as needed including keeping skin clean and dry  - Evaluate need for skin moisturizer/barrier cream  - Collaborate with interdisciplinary team   - Patient/family teaching  - Consider wound care consult   Outcome: Progressing     Problem: SAFETY,RESTRAINT: NV/NON-SELF DESTRUCTIVE BEHAVIOR  Goal: Remains free of harm/injury (restraint for non violent/non self-detsructive behavior)  Description: INTERVENTIONS:  - Instruct patient/family regarding restraint use   - Assess and monitor physiologic and psychological status   - Provide interventions and comfort measures to meet assessed patient needs   - Identify and implement measures to help patient regain control  - Assess readiness for release of restraint   Outcome: Progressing  Goal: Returns to optimal restraint-free functioning  Description: INTERVENTIONS:  - Assess the patient's  behavior and symptoms that indicate continued need for restraint  - Identify and implement measures to help patient regain control  - Assess readiness for release of restraint   Outcome: Progressing

## 2024-02-25 NOTE — ASSESSMENT & PLAN NOTE
The patient underwent surgical revascularization with vascular surgery  with EVAR on 2/21 in OR   She is now on xarelto, plavix and satin   PT and OT   Smoking cessation   Will need outpt podiatry and vascular follow up   Check labs in AM   Discharge planning once cleared by vascular team

## 2024-02-25 NOTE — ASSESSMENT & PLAN NOTE
New diagnosis   The patient is currently on amlodipine 5 mg daily and lisinopril 10 mg daily.  Her blood pressure control has improved   Monitor VS

## 2024-02-25 NOTE — ASSESSMENT & PLAN NOTE
60 yo female smoker, obesity (BMI 51), newly diagnosed DM2, Htn and hyperlipidemia and severe PAD who presented to presented to [facility] on 2/19/24 w/ R toe pain. Pt was diagnosed w/ distal embolism to R toes likely secondary to atherosclerotic plaque. Pt was seen in consult by cardiology. Echo showed normal EF w/ no evidence of intracardiac thrombus.     Vascular surgery consulted for R great toe pain. LEAD showed tibioperoneal disease w/ occlusions vs high grade stenoses in the R AT, PT and peroneal arteries, likely secondary to atheroemboli from distal aorta or SFA. CTA abdomen w/ B/L runoff showed noncalcific atherosclerotic plaque involving the distal infrarenal aorta w/ aortic luminal narrowing and R SFA disease. Pt is S/P RLE angiogram in IR on 2/21 w/ an unsuccessful attempt at SFA thrombectomy. Tibial arteries completely thrombosed. Initially successful AT/peroneal thrombectomy rethrombosed without arterial outflow. Attempt at lysis in IR aborted in favor of thrombectomy in OR secondary to rapid worsening of foot. Pt is S/P EVAR, RLE pop/TPT thrombectomy, R SFA stent and PT/AT angioplasty in OR on 2/21.       Diagnostics:  -2/19/24 R great toe xray: Mild soft tissue swelling overlying the first toe with no acute osseous abnormality.   -2/19/24 LEAD: Right: Evidence of diffuse arterial occlusive disease throughout the femoropopliteal segments without focal stenosis. Evidence of tibioperoneal disease with occlusions vs high grade stenoses in the anterior tibial, posterior tibial and peroneal arteries. R EVELIA: 1.39/-/- (poorly compressible vessels). Left: L EVELIA: 1.32/220/110.  -2/19/24 CTA abdomen w/ runoff: Large volume of noncalcific atherosclerotic plaque involving the distal infrarenal aorta with aortic luminal narrowing. R SFA disease related to noncalcific plaque. The tibial runoff is poorly evaluated given venous contamination; however, the tibial vessels do appear intact with faint filling of the plantar  artery. The dorsalis pedis is not well visualized. No significant left lower extremity arterial disease. Splenic artery occlusion, likely chronic. No evidence of splenic infarct. There is decreased arterial mottling suggestive of decreased perfusion.    Plan:  -R great toe ischemia and pain without sensory/motor deficits likely 2/2 embolic from atherosclerotic plaque in distal infrarenal aorta/SFA   -S/P EVAR w/ RLE pop/TPT thrombectomy, R SFA stent and PT/AT angioplasty in OR on 2/21  -Stable POD #4 after EVAR and thrombectomy  -B/L groin sites are soft with small ecchymoses; no erythema or drainage  -We discussed that we will let the R great toe demarcate; patient aware that she may lose the toe  -Continue clopidogrel and atorvastatin for medical management of PAD  -Placed on Xarelto 20 as per Dr. Trina Healy on 2/23  -Awaiting PT/OT  -Smoking cessation discussed; does not feel that she needs nicotine replacement as was smoking 5 cigs/day; pt determined to quit  -Will discuss w/ Dr. Adler

## 2024-02-25 NOTE — PLAN OF CARE
Problem: PAIN - ADULT  Goal: Verbalizes/displays adequate comfort level or baseline comfort level  Description: Interventions:  - Encourage patient to monitor pain and request assistance  - Assess pain using appropriate pain scale  - Administer analgesics based on type and severity of pain and evaluate response  - Implement non-pharmacological measures as appropriate and evaluate response  - Consider cultural and social influences on pain and pain management  - Notify physician/advanced practitioner if interventions unsuccessful or patient reports new pain  Outcome: Progressing     Problem: SAFETY ADULT  Goal: Patient will remain free of falls  Description: INTERVENTIONS:  - Educate patient/family on patient safety including physical limitations  - Instruct patient to call for assistance with activity   - Consult OT/PT to assist with strengthening/mobility   - Keep Call bell within reach  - Keep bed low and locked with side rails adjusted as appropriate  - Keep care items and personal belongings within reach  - Initiate and maintain comfort rounds  - Make Fall Risk Sign visible to staff  - Offer Toileting every 2 Hours, in advance of need  - Obtain necessary fall risk management equipment: socks/wristband   - Apply yellow socks and bracelet for high fall risk patients  - Consider moving patient to room near nurses station  Outcome: Progressing  Goal: Maintain or return to baseline ADL function  Description: INTERVENTIONS:  -  Assess patient's ability to carry out ADLs; assess patient's baseline for ADL function and identify physical deficits which impact ability to perform ADLs (bathing, care of mouth/teeth, toileting, grooming, dressing, etc.)  - Assess/evaluate cause of self-care deficits   - Assess range of motion  - Assess patient's mobility; develop plan if impaired  - Assess patient's need for assistive devices and provide as appropriate  - Encourage maximum independence but intervene and supervise when  necessary  - Involve family in performance of ADLs  - Assess for home care needs following discharge   - Consider OT consult to assist with ADL evaluation and planning for discharge  - Provide patient education as appropriate  Outcome: Progressing  Goal: Maintains/Returns to pre admission functional level  Description: INTERVENTIONS:  - Perform AM-PAC 6 Click Basic Mobility/ Daily Activity assessment daily.  - Set and communicate daily mobility goal to care team and patient/family/caregiver.   - Collaborate with rehabilitation services on mobility goals if consulted  - Perform Range of Motion 2-3 times a day.  - Reposition patient every 2 hours.  - Dangle patient 1-2 times a day  - Stand patient 1-2 times a day  - Ambulate patient 1-2 times a day  - Out of bed to chair 1-2 times a day   - Out of bed for meals 1-2 times a day  - Out of bed for toileting  - Record patient progress and toleration of activity level   Outcome: Progressing     Problem: DISCHARGE PLANNING  Goal: Discharge to home or other facility with appropriate resources  Description: INTERVENTIONS:  - Identify barriers to discharge w/patient and caregiver  - Arrange for needed discharge resources and transportation as appropriate  - Identify discharge learning needs (meds, wound care, etc.)  - Arrange for interpretive services to assist at discharge as needed  - Refer to Case Management Department for coordinating discharge planning if the patient needs post-hospital services based on physician/advanced practitioner order or complex needs related to functional status, cognitive ability, or social support system  Outcome: Progressing     Problem: Knowledge Deficit  Goal: Patient/family/caregiver demonstrates understanding of disease process, treatment plan, medications, and discharge instructions  Description: Complete learning assessment and assess knowledge base.  Interventions:  - Provide teaching at level of understanding  - Provide teaching via  preferred learning methods  Outcome: Progressing     Problem: CARDIOVASCULAR - ADULT  Goal: Absence of cardiac dysrhythmias or at baseline rhythm  Description: INTERVENTIONS:  - Continuous cardiac monitoring, vital signs, obtain 12 lead EKG if ordered  - Administer antiarrhythmic and heart rate control medications as ordered  - Monitor electrolytes and administer replacement therapy as ordered  Outcome: Progressing     Problem: SKIN/TISSUE INTEGRITY - ADULT  Goal: Skin Integrity remains intact(Skin Breakdown Prevention)  Description: Assess:  -Perform Marty assessment every shift  -Clean and moisturize skin every shift and prn  -Inspect skin when repositioning, toileting, and assisting with ADLS  -Assess extremities for adequate circulation and sensation     Bed Management:  -Have minimal linens on bed & keep smooth, unwrinkled  -Change linens as needed when moist or perspiring  -Avoid sitting or lying in one position for more than 2 hours while in bed  -Keep HOB at 45degrees     Toileting:  -Offer bedside commode  -Assess for incontinence every 2 hours      Activity:  -Mobilize patient 2-3 times a day  -Encourage activity and walks on unit  -Encourage or provide ROM exercises   -Turn and reposition patient every 2 Hours  -Use appropriate equipment to lift or move patient in bed  -Instruct/ Assist with weight shifting every 2 when out of bed in chair      Skin Care:  -Avoid use of baby powder, tape, friction and shearing, hot water or constrictive clothing  -Relieve pressure over bony prominences using foam dressings  -Do not massage red bony areas    Next Steps:  -Teach patient strategies to minimize risks such as frequent repositioning   -Consider consults to  interdisciplinary teams such as nutition/wound care  Outcome: Progressing  Goal: Incision(s), wounds(s) or drain site(s) healing without S/S of infection  Description: INTERVENTIONS  - Assess and document dressing, incision, wound bed, drain sites and  surrounding tissue  - Provide patient and family education  - Perform skin care/dressing changes every day  Outcome: Progressing  Goal: Pressure injury heals and does not worsen  Description: Interventions:  - Implement low air loss mattress or specialty surface (Criteria met)  - Apply silicone foam dressing  - Instruct/assist with weight shifting every 20-30minutes when in chair   - Limit chair time to 2 hour intervals  - Use special pressure reducing interventions such as air cushion when in chair   - Apply fecal or urinary incontinence containment device   - Perform passive or active ROM every shift  - Turn and reposition patient & offload bony prominences every 2 hours   - Utilize friction reducing device or surface for transfers   - Consider nutrition services referral as needed  Outcome: Progressing     Problem: MUSCULOSKELETAL - ADULT  Goal: Maintain or return mobility to safest level of function  Description: INTERVENTIONS:  - Assess patient's ability to carry out ADLs; assess patient's baseline for ADL function and identify physical deficits which impact ability to perform ADLs (bathing, care of mouth/teeth, toileting, grooming, dressing, etc.)  - Assess/evaluate cause of self-care deficits   - Assess range of motion  - Assess patient's mobility  - Assess patient's need for assistive devices and provide as appropriate  - Encourage maximum independence but intervene and supervise when necessary  - Involve family in performance of ADLs  - Assess for home care needs following discharge   - Consider OT consult to assist with ADL evaluation and planning for discharge  - Provide patient education as appropriate  Outcome: Progressing  Goal: Maintain proper alignment of affected body part  Description: INTERVENTIONS:  - Support, maintain and protect limb and body alignment  - Provide patient/ family with appropriate education  Outcome: Progressing     Problem: METABOLIC, FLUID AND ELECTROLYTES - ADULT  Goal:  Electrolytes maintained within normal limits  Description: INTERVENTIONS:  - Monitor labs and assess patient for signs and symptoms of electrolyte imbalances  - Administer electrolyte replacement as ordered  - Monitor response to electrolyte replacements, including repeat lab results as appropriate  - Instruct patient on fluid and nutrition as appropriate  Outcome: Progressing  Goal: Fluid balance maintained  Description: INTERVENTIONS:  - Monitor labs   - Monitor I/O and WT  - Instruct patient on fluid and nutrition as appropriate  - Assess for signs & symptoms of volume excess or deficit  Outcome: Progressing  Goal: Glucose maintained within target range  Description: INTERVENTIONS:  - Monitor Blood Glucose as ordered  - Assess for signs and symptoms of hyperglycemia and hypoglycemia  - Administer ordered medications to maintain glucose within target range  - Assess nutritional intake and initiate nutrition service referral as needed  Outcome: Progressing     Problem: Prexisting or High Potential for Compromised Skin Integrity  Goal: Skin integrity is maintained or improved  Description: INTERVENTIONS:  - Identify patients at risk for skin breakdown  - Assess and monitor skin integrity  - Assess and monitor nutrition and hydration status  - Monitor labs   - Assess for incontinence   - Turn and reposition patient  - Assist with mobility/ambulation  - Relieve pressure over bony prominences  - Avoid friction and shearing  - Provide appropriate hygiene as needed including keeping skin clean and dry  - Evaluate need for skin moisturizer/barrier cream  - Collaborate with interdisciplinary team   - Patient/family teaching  - Consider wound care consult   Outcome: Progressing

## 2024-02-25 NOTE — PROGRESS NOTES
[facility]  Progress Note  Name: Georgie King I  MRN: [id number]  Unit/Bed#: E2 -01 I Date of Admission: 2/19/2024   Date of Service: 2/25/2024 I Hospital Day: 6    Assessment/Plan   PAD (peripheral artery disease) (Formerly [facility])  Assessment & Plan  60 yo female smoker, obesity (BMI 51), newly diagnosed DM2, Htn and hyperlipidemia and severe PAD who presented to presented to [facility] on 2/19/24 w/ R toe pain. Pt was diagnosed w/ distal embolism to R toes likely secondary to atherosclerotic plaque. Pt was seen in consult by cardiology. Echo showed normal EF w/ no evidence of intracardiac thrombus.     Vascular surgery consulted for R great toe pain. LEAD showed tibioperoneal disease w/ occlusions vs high grade stenoses in the R AT, PT and peroneal arteries, likely secondary to atheroemboli from distal aorta or SFA. CTA abdomen w/ B/L runoff showed noncalcific atherosclerotic plaque involving the distal infrarenal aorta w/ aortic luminal narrowing and R SFA disease. Pt is S/P RLE angiogram in IR on 2/21 w/ an unsuccessful attempt at SFA thrombectomy. Tibial arteries completely thrombosed. Initially successful AT/peroneal thrombectomy rethrombosed without arterial outflow. Attempt at lysis in IR aborted in favor of thrombectomy in OR secondary to rapid worsening of foot. Pt is S/P EVAR, RLE pop/TPT thrombectomy, R SFA stent and PT/AT angioplasty in OR on 2/21.       Diagnostics:  -2/19/24 R great toe xray: Mild soft tissue swelling overlying the first toe with no acute osseous abnormality.   -2/19/24 LEAD: Right: Evidence of diffuse arterial occlusive disease throughout the femoropopliteal segments without focal stenosis. Evidence of tibioperoneal disease with occlusions vs high grade stenoses in the anterior tibial, posterior tibial and peroneal arteries. R EVELIA: 1.39/-/- (poorly compressible vessels). Left: L EVELIA: 1.32/220/110.  -2/19/24 CTA abdomen w/ runoff: Large volume of noncalcific  "atherosclerotic plaque involving the distal infrarenal aorta with aortic luminal narrowing. R SFA disease related to noncalcific plaque. The tibial runoff is poorly evaluated given venous contamination; however, the tibial vessels do appear intact with faint filling of the plantar artery. The dorsalis pedis is not well visualized. No significant left lower extremity arterial disease. Splenic artery occlusion, likely chronic. No evidence of splenic infarct. There is decreased arterial mottling suggestive of decreased perfusion.    Plan:  -R great toe ischemia and pain without sensory/motor deficits likely 2/2 embolic from atherosclerotic plaque in distal infrarenal aorta/SFA   -S/P EVAR w/ RLE pop/TPT thrombectomy, R SFA stent and PT/AT angioplasty in OR on 2/21  -Stable POD #4 after EVAR and thrombectomy  -B/L groin sites are soft with small ecchymoses; no erythema or drainage  -We discussed that we will let the R great toe demarcate; patient aware that she may lose the toe  -Continue clopidogrel and atorvastatin for medical management of PAD  -Placed on Xarelto 20 as per Dr. Trina Healy on 2/23  -Awaiting PT/OT  -Smoking cessation discussed; does not feel that she needs nicotine replacement as was smoking 5 cigs/day; pt determined to quit  -Will discuss w/ Dr. Adler         Subjective:  Doing well.  No new complaints.  Motivated to quit smoking.  Patient care working on arranging discharge planning with medications, follow-up instructions and helping her find a primary care.    She is up and ambulating. No chest pain or shortness of breath. No problems with procedure sites.     Vitals:  /58 (BP Location: Left arm)   Pulse 84   Temp 97.7 °F (36.5 °C) (Temporal)   Resp 17   Ht 5' 1\" (1.549 m)   Wt 123 kg (270 lb 1 oz)   SpO2 97%   BMI 51.03 kg/m²     I/Os:  I/O last 3 completed shifts:  In: 900 [P.O.:900]  Out: 300 [Urine:300]  No intake/output data recorded.    Lab Results and Cultures:   Lab Results " "  Component Value Date    WBC 11.43 (H) 02/23/2024    HGB 8.7 (L) 02/23/2024    HCT 28.4 (L) 02/23/2024    MCV 86 02/23/2024     02/23/2024     Lab Results   Component Value Date    GLUCOSE 130 02/21/2024    CALCIUM 7.9 (L) 02/23/2024     03/16/2015    K 3.5 02/23/2024    CO2 23 02/23/2024     (H) 02/23/2024    BUN 7 02/23/2024    CREATININE 0.53 (L) 02/23/2024     Lab Results   Component Value Date    INR 1.24 (H) 02/22/2024    INR 1.13 02/21/2024    PROTIME 15.8 (H) 02/22/2024    PROTIME 14.7 (H) 02/21/2024        Blood Culture: No results found for: \"BLOODCX\",   Urinalysis:   Lab Results   Component Value Date    COLORU Yellow 03/15/2015    CLARITYU Clear 03/15/2015    SPECGRAV >=1.030 03/15/2015    PHUR 6.0 03/15/2015    LEUKOCYTESUR Negative 03/15/2015    NITRITE Negative 03/15/2015    PROTEINUA 30 (1+) (A) 03/15/2015    GLUCOSEU Negative 03/15/2015    KETONESU 15 (1+) (A) 03/15/2015    BILIRUBINUR Negative 03/15/2015    BLOODU Trace (A) 03/15/2015   ,   Urine Culture: No results found for: \"URINECX\",   Wound Culure: No results found for: \"WOUNDCULT\"    Medications:  Current Facility-Administered Medications   Medication Dose Route Frequency    acetaminophen (TYLENOL) tablet 975 mg  975 mg Oral Q8H PRN    amLODIPine (NORVASC) tablet 5 mg  5 mg Oral Daily    atorvastatin (LIPITOR) tablet 40 mg  40 mg Oral Daily With Dinner    ceFAZolin (ANCEF) IVPB (premix in dextrose) 2,000 mg 50 mL  2,000 mg Intravenous Once    clopidogrel (PLAVIX) tablet 75 mg  75 mg Oral Daily    insulin glargine (LANTUS) subcutaneous injection 8 Units 0.08 mL  8 Units Subcutaneous QAM    insulin lispro (HumaLOG) 100 units/mL subcutaneous injection 2-12 Units  2-12 Units Subcutaneous 4x Daily (AC & HS)    lisinopril (ZESTRIL) tablet 10 mg  10 mg Oral Daily    naloxone (NARCAN) 0.04 mg/mL syringe 0.04 mg  0.04 mg Intravenous Q1MIN PRN    nicotine (NICODERM CQ) 21 mg/24 hr TD 24 hr patch 1 patch  1 patch Transdermal Daily    " ondansetron (ZOFRAN) injection 4 mg  4 mg Intravenous Q6H PRN    oxyCODONE (ROXICODONE) split tablet 2.5 mg  2.5 mg Oral Q6H PRN    Or    oxyCODONE (ROXICODONE) IR tablet 5 mg  5 mg Oral Q6H PRN    polyethylene glycol (MIRALAX) packet 17 g  17 g Oral Daily PRN    rivaroxaban (XARELTO) tablet 20 mg  20 mg Oral Daily With Dinner       Imaging:  No new labs    Physical Exam:    General appearance: alert and oriented, in no acute distress  Lungs: clear to auscultation bilaterally  Heart: regular rate and rhythm, S1, S2 normal, no murmur, click, rub or gallop  Abdomen: Obese; + ecchymoses; soft/ NT.   Extremities:     Ischemic R great toe with erythema at the base; R foot warm    L foot warm and well-perfused.     Bilateral groin sites - look fine; soft, mild ecchymoses      Bernadette Alarcon PA-C  2/25/2024  [facility]

## 2024-02-25 NOTE — ASSESSMENT & PLAN NOTE
Newly diagnosed.  A1c 12.3%  Currently on lantus 8 units qAM   When her glycemic control is improved, metformin, SGLT2 inhibitors, or GLP-1 agonists should be considered. She is refusing outpt insulin   Glucometer sent to [facility] already   The patient intends to establish a primary care relationship when she is discharged.and referral to endo   Will need opthalmology follow up outpt   Nutrition consulted

## 2024-02-25 NOTE — ASSESSMENT & PLAN NOTE
patient has undergone surgical revascularization of her right leg by vascular surgery on 2/21 with right thrombectomy/embolectomy   She is now on Xarelto and plavix, statin   She is on nicotine replacement and intends to stop smoking   Management per vascular surgery

## 2024-02-26 ENCOUNTER — DOCUMENTATION (OUTPATIENT)
Dept: VASCULAR SURGERY | Facility: CLINIC | Age: 59
End: 2024-02-26

## 2024-02-26 VITALS
HEART RATE: 76 BPM | OXYGEN SATURATION: 99 % | SYSTOLIC BLOOD PRESSURE: 108 MMHG | WEIGHT: 270.06 LBS | TEMPERATURE: 97.5 F | HEIGHT: 61 IN | RESPIRATION RATE: 18 BRPM | BODY MASS INDEX: 50.99 KG/M2 | DIASTOLIC BLOOD PRESSURE: 55 MMHG

## 2024-02-26 LAB
ANION GAP SERPL CALCULATED.3IONS-SCNC: 8 MMOL/L
BASOPHILS # BLD AUTO: 0.06 THOUSANDS/ÂΜL (ref 0–0.1)
BASOPHILS NFR BLD AUTO: 1 % (ref 0–1)
BUN SERPL-MCNC: 7 MG/DL (ref 5–25)
CALCIUM SERPL-MCNC: 8.4 MG/DL (ref 8.4–10.2)
CHLORIDE SERPL-SCNC: 107 MMOL/L (ref 96–108)
CO2 SERPL-SCNC: 24 MMOL/L (ref 21–32)
CREAT SERPL-MCNC: 0.5 MG/DL (ref 0.6–1.3)
EOSINOPHIL # BLD AUTO: 0.31 THOUSAND/ÂΜL (ref 0–0.61)
EOSINOPHIL NFR BLD AUTO: 3 % (ref 0–6)
ERYTHROCYTE [DISTWIDTH] IN BLOOD BY AUTOMATED COUNT: 13.6 % (ref 11.6–15.1)
GFR SERPL CREATININE-BSD FRML MDRD: 105 ML/MIN/1.73SQ M
GLUCOSE SERPL-MCNC: 142 MG/DL (ref 65–140)
GLUCOSE SERPL-MCNC: 144 MG/DL (ref 65–140)
GLUCOSE SERPL-MCNC: 163 MG/DL (ref 65–140)
HCT VFR BLD AUTO: 28.2 % (ref 34.8–46.1)
HGB BLD-MCNC: 8.7 G/DL (ref 11.5–15.4)
IMM GRANULOCYTES # BLD AUTO: 0.04 THOUSAND/UL (ref 0–0.2)
IMM GRANULOCYTES NFR BLD AUTO: 0 % (ref 0–2)
LYMPHOCYTES # BLD AUTO: 2.47 THOUSANDS/ÂΜL (ref 0.6–4.47)
LYMPHOCYTES NFR BLD AUTO: 27 % (ref 14–44)
MCH RBC QN AUTO: 26.5 PG (ref 26.8–34.3)
MCHC RBC AUTO-ENTMCNC: 30.9 G/DL (ref 31.4–37.4)
MCV RBC AUTO: 86 FL (ref 82–98)
MONOCYTES # BLD AUTO: 0.9 THOUSAND/ÂΜL (ref 0.17–1.22)
MONOCYTES NFR BLD AUTO: 10 % (ref 4–12)
NEUTROPHILS # BLD AUTO: 5.45 THOUSANDS/ÂΜL (ref 1.85–7.62)
NEUTS SEG NFR BLD AUTO: 59 % (ref 43–75)
NRBC BLD AUTO-RTO: 0 /100 WBCS
PLATELET # BLD AUTO: 380 THOUSANDS/UL (ref 149–390)
PMV BLD AUTO: 10.4 FL (ref 8.9–12.7)
POTASSIUM SERPL-SCNC: 3.3 MMOL/L (ref 3.5–5.3)
RBC # BLD AUTO: 3.28 MILLION/UL (ref 3.81–5.12)
SODIUM SERPL-SCNC: 139 MMOL/L (ref 135–147)
WBC # BLD AUTO: 9.23 THOUSAND/UL (ref 4.31–10.16)

## 2024-02-26 PROCEDURE — 99239 HOSP IP/OBS DSCHRG MGMT >30: CPT | Performed by: INTERNAL MEDICINE

## 2024-02-26 PROCEDURE — 99232 SBSQ HOSP IP/OBS MODERATE 35: CPT | Performed by: SURGERY

## 2024-02-26 PROCEDURE — 82948 REAGENT STRIP/BLOOD GLUCOSE: CPT

## 2024-02-26 PROCEDURE — 80048 BASIC METABOLIC PNL TOTAL CA: CPT | Performed by: PHYSICIAN ASSISTANT

## 2024-02-26 PROCEDURE — 85025 COMPLETE CBC W/AUTO DIFF WBC: CPT | Performed by: PHYSICIAN ASSISTANT

## 2024-02-26 RX ORDER — POTASSIUM CHLORIDE 20 MEQ/1
40 TABLET, EXTENDED RELEASE ORAL ONCE
Status: COMPLETED | OUTPATIENT
Start: 2024-02-26 | End: 2024-02-26

## 2024-02-26 RX ORDER — METFORMIN HYDROCHLORIDE 750 MG/1
750 TABLET, EXTENDED RELEASE ORAL
Qty: 30 TABLET | Refills: 0 | Status: SHIPPED | OUTPATIENT
Start: 2024-02-26 | End: 2024-03-27

## 2024-02-26 RX ORDER — GLIMEPIRIDE 2 MG/1
2 TABLET ORAL
Qty: 30 TABLET | Refills: 0 | Status: SHIPPED | OUTPATIENT
Start: 2024-02-27 | End: 2024-03-28

## 2024-02-26 RX ORDER — GLIMEPIRIDE 2 MG/1
2 TABLET ORAL
Status: DISCONTINUED | OUTPATIENT
Start: 2024-02-27 | End: 2024-02-26 | Stop reason: HOSPADM

## 2024-02-26 RX ADMIN — CLOPIDOGREL BISULFATE 75 MG: 75 TABLET ORAL at 09:08

## 2024-02-26 RX ADMIN — INSULIN GLARGINE 8 UNITS: 100 INJECTION, SOLUTION SUBCUTANEOUS at 09:09

## 2024-02-26 RX ADMIN — POTASSIUM CHLORIDE 40 MEQ: 1500 TABLET, EXTENDED RELEASE ORAL at 10:02

## 2024-02-26 RX ADMIN — Medication 1 PATCH: at 09:08

## 2024-02-26 NOTE — PROGRESS NOTES
Vascular Nurse Navigator Post Op Education    Met with patient and  Mir at bedside to introduce myself as Vascular Nurse Navigator and explained my role.  Patient is appropriate and accepting to education. Patient was educated with Review of written materials provided, Teachback, Explanation, Demonstration, and Question & Answer on expectations of post op care and recovery on EVAR w/ RLE pop/TPT thrombectomy, R SFA stent and PT/AT angioplasty. Patient is a smoker  (1/4 ppd x 48 yrs), as such Smoking effects on the lungs, tobacco triggers, and Smoking cessation was reviewed. Education provided to patient and her  Mir on infection prevention, activity limitations, when to call the office, importance of follow up, and incisional care.  Discharge instruction handout provided to patient to review.        Tobacco use is a significant patient-modifiable risk factor for this patient’s vascular disease with multiple vascular comorbidities, and a significant risk factor for failure of and complications from any endovascular or surgical interventions.    I explained to the patient the effects of smoking including peripheral artery disease, coronary artery disease, cerebrovascular disease as well as cancer and chronic obstructive pulmonary disease. I asked the patient to stop smoking immediately. It is never too late to quit, and many studies show significant health benefits as well as economical savings after smoking cessation. I offered to the patient nicotine replacement therapy as well as referral to the smoking cessation program and access to the quit line 2-743-PORAOUB or ambulatory referral to our network smoking cessation program.    Based on our conversation, this patient appears motivated to quit  And plans to quit without nicotine replacement or medications    The patient did not set a quit date.     I spent approximately 5 minutes on tobacco cessation counseling with this patient.

## 2024-02-26 NOTE — ASSESSMENT & PLAN NOTE
58 yo female smoker w/ no past medical history presented to [facility] on 2/19/24 w/ R toe pain. Pt was diagnosed w/ distal embolism to R toes likely secondary to atherosclerotic plaque. Pt was seen in consult by cardiology. Echo showed normal EF w/ no evidence of intracardiac thrombus. During her hospital stay, she was newly diagnosed w/ HTN, dyslipidemia, obesity, DM II and severe PAD.     Vascular surgery consulted for R great toe pain. LEAD showed tibioperoneal disease w/ occlusions vs high grade stenoses in the R AT, PT and peroneal arteries, likely secondary to atheroemboli from distal aorta or SFA. CTA abdomen w/ B/L runoff showed noncalcific atherosclerotic plaque involving the distal infrarenal aorta w/ aortic luminal narrowing and R SFA disease. Pt is S/P RLE angiogram in IR on 2/21 w/ an unsuccessful attempt at SFA thrombectomy. Tibial arteries completely thrombosed. Initially successful AT/peroneal thrombectomy rethrombosed without arterial outflow. Attempt at lysis in IR aborted in favor of thrombectomy in OR secondary to rapid worsening of foot. Pt is S/P EVAR, RLE pop/TPT thrombectomy, R SFA stent and PT/AT angioplasty in OR on 2/21.       Diagnostics:  -2/19/24 R great toe xray: Mild soft tissue swelling overlying the first toe with no acute osseous abnormality.   -2/19/24 LEAD: Right: Evidence of diffuse arterial occlusive disease throughout the femoropopliteal segments without focal stenosis. Evidence of tibioperoneal disease with occlusions vs high grade stenoses in the anterior tibial, posterior tibial and peroneal arteries. R EVELIA: 1.39/-/- (poorly compressible vessels). Left: L EVELIA: 1.32/220/110.  -2/19/24 CTA abdomen w/ runoff: Large volume of noncalcific atherosclerotic plaque involving the distal infrarenal aorta with aortic luminal narrowing. R SFA disease related to noncalcific plaque. The tibial runoff is poorly evaluated given venous contamination; however, the tibial vessels do appear intact  with faint filling of the plantar artery. The dorsalis pedis is not well visualized. No significant left lower extremity arterial disease. Splenic artery occlusion, likely chronic. No evidence of splenic infarct. There is decreased arterial mottling suggestive of decreased perfusion.     Plan:  -R great toe ischemia and pain without sensory/motor deficits  -Likely secondary to atherosclerotic plaque embolism from distal infrarenal aorta/SFA   -S/P EVAR w/ RLE pop/TPT thrombectomy, R SFA stent and PT/AT angioplasty in OR on 2/21 (POD #5)  -B/L groin sites soft w/ small areas of ecchymosis, no erythema or drainage, GRAHAM  -Continue plavix, xarelto 20mg and lipitor for medical management of PAD  -Plan to allow R great toe to demarcate and follow up w/ podiatry as an outpt for possible eventual toe amputation  -Smoking cessation; pt is determined to quit at this time  -Pt is stable for discharge from a vascular surgery standpoint  -Follow up w/ pt in the office; appt scheduled for 3/8/24  -Please contact vascular surgery for any further questions or concerns  -Discussed w/ Dr. Doran

## 2024-02-26 NOTE — QUICK NOTE
[facility]  [address] [place] [address]  (P) [phone]  (F) [phone]  24      RE:  Georgie King 59 y.o. female  : 1965    To whom it may concern,     Georgie King was hospitalized under my care from 2024 to 24. Please excuse from all appointments and/or work and school related activities during this interim. Patient may return to work at previous activity level on/or after 3/8/2024    Feel free to contact me with any questions if necessary.         Sincerely,                                                                    //Digital Signature//         Oren Stevens DO FACP       [facility]

## 2024-02-26 NOTE — DISCHARGE SUMMARY
[facility]  Discharge- Georgie King 1965, 59 y.o. female MRN: [id number]  Unit/Bed#: E2 -01 Encounter: [id number]  Primary Care Provider: No primary care provider on file.   Date and time admitted to hospital: 2/19/2024  9:44 AM      Admitting Provider:  Kali Lindsey MD  Discharge Provider:  Oren Stevens DO  Admission Date: 2/19/2024       Discharge Date: 02/26/24   LOS: 7  Primary Care Physician at Discharge: No primary care provider on file. None    HOSPITAL COURSE:  Georgie King is a 59 y.o. female who presented right toe pain.  The patient has a past medical history of morbid obesity, newly diagnosed diabetes mellitus, hypertension hyperlipidemia as well as severe peripheral arterial disease.  The patient was diagnosed with distal embolism to the right toes likely secondary to arthrosclerotic plaque.  She was seen in consult by the cardiology service and underwent echocardiogram with normal ejection fraction and no evidence of intracardiac thrombus.  Subsequently was evaluated in consultation by the vascular surgical service.  Lower extremity Doppler studies demonstrated tibioperoneal disease with occlusions with possible high-grade stenosis.  The patient subsequently underwent right lower extremity angiogram by interventional radiology on 2/21 with an unsuccessful attempt at SFA thrombectomy.  The patient's tibial arteries were completely thrombosed, and additional successful AT/peroneal thrombectomy thrombosed without arterial flow.  There was an attempt at lysis in the interventional radiology suite but was imported in favor of thrombectomy in the OR secondary to rapid worsening of the foot.  The patient is now status post EVAR, right lower extremity thrombectomy and right SFA stent placement with angioplasty of the PT/AT.    The patient remained clinically stable, on postop day #5 she was cleared by the vascular surgical service for discharge.   Bilateral groin sites were noted to have small ecchymosis but no other erythema or drainage.  The patient will need to allow her right great toe to demarcate and she is high risk for proximal limb loss.  She will continue on clopidogrel as well as atorvastatin for medical management of peripheral arterial disease.  She was also placed on Xarelto.  The patient was counseled on smoking cessation as well as diabetes management.    The patient was found to have an A1c of 12.3 on admission, she refused insulin therapy.  She will be discharged with metformin as well as Amaryl and was provided a glucometer.    Patient was counseled strongly on smoking cessation, and will follow-up with podiatry and vascular surgery as an outpatient    At the time of discharge the patient was tolerating oral diet they were without acute complaint and they were medically cleared for discharge.  All questions were answered the patient's satisfaction and they were in agreement with the discharge plan    DISCHARGE DIAGNOSES  * Acute ischemia of Right great toe  Assessment & Plan  The patient underwent surgical revascularization with vascular surgery  with EVAR on 2/21 in OR   She is now on xarelto, plavix and satin   Smoking cessation   Will need outpt podiatry and vascular follow up   Cleared by Vascular surgery      Dyslipidemia  Assessment & Plan  Continue atorvastatin 40 mg daily.    HTN (hypertension)  Assessment & Plan  New diagnosis   The patient is currently on amlodipine 5 mg daily and lisinopril 10 mg daily.  Her blood pressure control has improved   Monitor VS     Morbid obesity (HCC)  Assessment & Plan  BMI 48 noted   Nutritionist consulted   Would benefit from weight loss       Abnormal CT scan  Assessment & Plan  Noting prominent left ovary Nonemergent pelvic ultrasound can be obtained if clinically warranted   Incidental note completed  I discussed this with patient and need for outpt follow up - she demonstrated understanding      Type 2 diabetes mellitus, without long-term current use of insulin (Formerly [facility])  Assessment & Plan  Newly diagnosed.  A1c 12.3%  Refusing any injectable or insulin despite education   Will send on amaryl and metformin with adjustments with pcp as warranted   Repeat A1c in 3-4 months   Glucometer sent to [facility] to continue checking accuchecks and keeping blood glucose log   The patient intends to establish a primary care relationship when she is discharged.and referral to endo will be placed   Will need opthalmology follow up outpt for eye exam   Nutrition consulted here  Extensive education given on the importance of glycemic control including and lifestyle modifications including diet, weight loss and exercise to prevent ongoing complications from diabetes      Tobacco abuse  Assessment & Plan   Nicotine patch   Smoking cessation education     PAD (peripheral artery disease) (Formerly [facility])  Assessment & Plan  patient has undergone surgical revascularization of her right leg by vascular surgery on 2/21 with right thrombectomy/embolectomy   She is now on Xarelto and plavix, statin   She is on nicotine replacement and intends to stop smoking   Management per vascular surgery       CONSULTING PROVIDERS   IP CONSULT TO PODIATRY  IP CONSULT TO VASCULAR SURGERY  IP CONSULT TO CARDIOLOGY  IP CONSULT TO NUTRITION SERVICES  INPATIENT CONSULT TO IR  IP CONSULT TO CASE MANAGEMENT    PROCEDURES PERFORMED  Procedure(s) (LRB):  EMBOLECTOMY/THROMBECTOMY LOWER EXTREMITY Right (Right)    RADIOLOGY RESULTS  XR chest portable    Result Date: 2/22/2024  Impression: 1. Endotracheal tube terminates 2.9 cm above the james. Nasogastric tube courses below the inferior margin of the study. 2. Bilateral perihilar opacities which are favored to represent central pulmonary vascular congestion versus mild pulmonary edema. Pneumonia may present a similar appearance in the appropriate clinical setting. Workstation performed: [id number]     IR lower  extremity angiogram    Result Date: 2/21/2024  Impression: 1. Progressive acute thromboembolic arterial occlusive disease superimposed on chronic arterial occlusive disease of the tibial arteries, with development of complete thrombosis of the infrapopliteal arteries during the procedure prior to intervention. 2. Thrombosis was refractory to maximal endovascular attempts at thrombectomy/thrombolysis.  Worsening acute limb ischemia was observed at the termination of the procedure. 3. Successful placement of LUE basilic vein PICC, cut to 51 cm length and positioned with tip at the SVC confluence.  PICC is ready for immediate use. Plan: Transfer to hybrid OR for operative management. Workstation performed: [id number]     XR toe great min 2 views RIGHT    Result Date: 2/20/2024  Impression: Mild soft tissue swelling overlying the first toe with no acute osseous abnormality. Resident: CHAVA LABOY I, the attending radiologist, have reviewed the images and agree with the final report above. Workstation performed: [id number]     CTA ABDOMEN W RUN OFF W WO CONTRAST    Result Date: 2/19/2024  Impression: Large volume of noncalcific atherosclerotic plaque involving the distal infrarenal aorta with aortic luminal narrowing. Right SFA disease related to noncalcific plaque. The tibial runoff is poorly evaluated given venous contamination; however, the tibial vessels do appear intact with faint filling of the plantar artery. The dorsalis pedis is not well visualized. No significant left lower extremity arterial disease. Splenic artery occlusion, likely chronic. No evidence of splenic infarct. There is decreased arterial mottling suggestive of decreased perfusion. Prominent left ovary. Nonemergent pelvic ultrasound can be obtained if clinically warranted. Workstation performed: [id number]       LABS  Results from last 7 days   Lab Units 02/26/24  0609 02/23/24  0513 02/22/24  1152 02/22/24  0432 02/22/24  0231  02/21/24 2331 02/21/24 1912 02/21/24 1737 02/21/24 0453 02/20/24 0449   WBC Thousand/uL 9.23 11.43*  --  14.44*  --  15.17*  --   --   --  10.77*   HEMOGLOBIN g/dL 8.7* 8.7* 9.1* 9.0*  --  10.4*  --   --   --  12.6   I STAT HEMOGLOBIN g/dl  --   --   --   --   --   --  9.5* 9.9*  --   --    HEMATOCRIT % 28.2* 28.4*  --  28.7*  --  32.6*  --   --   --  40.2   HEMATOCRIT, ISTAT %  --   --   --   --   --   --  28* 29*  --   --    MCV fL 86 86  --  84  --  86  --   --   --  85   PLATELETS Thousands/uL 380 316  --  318  --  373  --   --   --  442*   INR   --   --   --   --  1.24*  --   --   --  1.13  --      Results from last 7 days   Lab Units 02/26/24  0609 02/23/24  0513 02/22/24 0432 02/21/24 2331 02/21/24 1912 02/21/24 1737 02/20/24 0449 02/19/24  1155   SODIUM mmol/L 139 140 138 139  139  --   --  138 138   POTASSIUM mmol/L 3.3* 3.5 3.5 3.8  3.8  --   --  3.9 3.9   CHLORIDE mmol/L 107 110* 111* 110*  110*  --   --  105 102   CO2 mmol/L 24 23 23 20*  20*  --   --  23 27   CO2, I-STAT mmol/L  --   --   --   --  19* 21  --   --    BUN mg/dL 7 7 8 9  9  --   --  10 10   CREATININE mg/dL 0.50* 0.53* 0.55* 0.58*  0.58*  --   --  0.53* 0.62   CALCIUM mg/dL 8.4 7.9* 7.2* 7.4*  7.4*  --   --  9.0 9.4   ALBUMIN g/dL  --   --   --  3.1*  --   --  3.7 4.1   TOTAL BILIRUBIN mg/dL  --   --   --  0.32  --   --  0.29 0.25   ALK PHOS U/L  --   --   --  73  --   --  80 102   ALT U/L  --   --   --  9  --   --  9 11   AST U/L  --   --   --  10*  --   --  8* 9*   EGFR ml/min/1.73sq m 105 103 102 100  100  --   --  103 98   GLUCOSE RANDOM mg/dL 142* 130 130 156*  156*  --   --  193* 223*                  Results from last 7 days   Lab Units 02/26/24  0659 02/25/24  2123 02/25/24  1535 02/25/24  1145 02/25/24  0601 02/24/24  2111 02/24/24  1514 02/24/24  1135 02/24/24  0743 02/23/24  2107   POC GLUCOSE mg/dl 144* 158* 147* 149* 144* 127 160* 124 150* 208*                 Results from last 7 days   Lab Units  "02/19/24  1155   TRIGLYCERIDES mg/dL 204*   CHOLESTEROL mg/dL 162   LDL CALC mg/dL 78   HDL mg/dL 43*       Cultures:         Invalid input(s): \"URIBILINOGEN\"                    PHYSICAL EXAM:  Vitals:   Blood Pressure: 108/55 (02/26/24 0753)  Pulse: 76 (02/26/24 0753)  Temperature: 97.5 °F (36.4 °C) (02/26/24 0753)  Temp Source: Temporal (02/26/24 0753)  Respirations: 18 (02/26/24 0753)  Height: 5' 1\" (154.9 cm) (02/21/24 2300)  Weight - Scale: 123 kg (270 lb 1 oz) (02/21/24 2300)  SpO2: 99 % (02/26/24 0753)      General: well appearing, no acute distress  HEENT: atraumatic, PERRLA, moist mucosa, normal pharynx, normal tonsils and adenoids, normal tongue, no fluid in sinuses  Neck: Trachea midline, no carotid bruit, no masses  Respiratory: normal chest wall expansion, CTA B  Cardiovascular: RRR, no m/r/g, Normal S1 and S2  Abdomen: Soft, non-tender, non-distended, normal bowel sounds in all quadrants, no hepatosplenomegaly, no tympany  Rectal: deferred  Musculoskeletal: Moves all  Integumentary: Right great toe ischemic  Heme/Lymph: no lymphadenopathy, no bruises  Neurological: Cranial Nerves II-XII grossly intact  Psychiatric: cooperative with normal mood, affect, and cognition       Discharge Disposition: Home/Self Care    AM-PAC Basic Mobility:  Basic Mobility Inpatient Raw Score: 23    JH-HLM Achieved: 7: Walk 25 feet or more  JH-HLM Goal: 7: Walk 25 feet or more    HLM Goal listed above. Continue with ongoing physical therapy and encourage appropriate mobility to improve upon HLM goals.      Test Results Pending at Discharge:           Medications   Summary of Medication Adjustments made as a result of this hospitalization: See discharge summary and AVS for medication changes  Medication Dosing Tapers - Please refer to Discharge Medication List for details on any medication dosing tapers (if applicable to patient).  Discharge Medication List: See after visit summary for reconciled discharge medications. "     Diet restrictions:         Diet Orders   (From admission, onward)                 Start     Ordered    02/22/24 1236  Diet Davonte/CHO Controlled; Consistent Carbohydrate Diet Level 2 (5 carb servings/75 grams CHO/meal)  Diet effective midnight        References:    Adult Nutrition Support Algorithm    RD Therapeutic Diet Order Protocol   Question Answer Comment   Diet Type Davonte/CHO Controlled    Davonte/CHO Controlled Consistent Carbohydrate Diet Level 2 (5 carb servings/75 grams CHO/meal)    RD to adjust diet per protocol? Yes        02/22/24 1235                  Activity restrictions: No strenuous activity  Discharge Condition: fair    Outpatient Follow-Up and Discharge Instructions  See after visit summary section titled Discharge Instructions for information provided to patient and family.      Code Status: Level 1 - Full Code  Discharge Statement   I spent 86 minutes discharging the patient. This time was spent on the day of discharge. Greater than 50% of total time was spent with the patient and / or family counseling and / or coordination of care.    ** Please Note: This note was completed in part utilizing [employer] Dragon [employer] One Software.  Grammatical errors, random word insertions, spelling mistakes, and incomplete sentences may be an occasional consequence of this system secondary to software limitations, ambient noise, and hardware issues.  If you have any questions or concerns about the content, text, or information contained within the body of this dictation, please contact the provider for clarification.**

## 2024-02-26 NOTE — PLAN OF CARE
Problem: PAIN - ADULT  Goal: Verbalizes/displays adequate comfort level or baseline comfort level  Description: Interventions:  - Encourage patient to monitor pain and request assistance  - Assess pain using appropriate pain scale  - Administer analgesics based on type and severity of pain and evaluate response  - Implement non-pharmacological measures as appropriate and evaluate response  - Consider cultural and social influences on pain and pain management  - Notify physician/advanced practitioner if interventions unsuccessful or patient reports new pain  Outcome: Progressing     Problem: SAFETY ADULT  Goal: Patient will remain free of falls  Description: INTERVENTIONS:  - Educate patient/family on patient safety including physical limitations  - Instruct patient to call for assistance with activity   - Consult OT/PT to assist with strengthening/mobility   - Keep Call bell within reach  - Keep bed low and locked with side rails adjusted as appropriate  - Keep care items and personal belongings within reach  - Initiate and maintain comfort rounds  - Make Fall Risk Sign visible to staff  - Offer Toileting every 3 Hours, in advance of need  - Initiate/Maintain bed alarm  - Obtain necessary fall risk management equipment: gripper socks  - Apply yellow socks and bracelet for high fall risk patients  - Consider moving patient to room near nurses station  Outcome: Progressing  Goal: Maintain or return to baseline ADL function  Description: INTERVENTIONS:  -  Assess patient's ability to carry out ADLs; assess patient's baseline for ADL function and identify physical deficits which impact ability to perform ADLs (bathing, care of mouth/teeth, toileting, grooming, dressing, etc.)  - Assess/evaluate cause of self-care deficits   - Assess range of motion  - Assess patient's mobility; develop plan if impaired  - Assess patient's need for assistive devices and provide as appropriate  - Encourage maximum independence but  intervene and supervise when necessary  - Involve family in performance of ADLs  - Assess for home care needs following discharge   - Consider OT consult to assist with ADL evaluation and planning for discharge  - Provide patient education as appropriate  Outcome: Progressing  Goal: Maintains/Returns to pre admission functional level  Description: INTERVENTIONS:  - Perform AM-PAC 6 Click Basic Mobility/ Daily Activity assessment daily.  - Set and communicate daily mobility goal to care team and patient/family/caregiver.   - Collaborate with rehabilitation services on mobility goals if consulted  - Perform Range of Motion 3 times a day.  - Reposition patient every 2 hours.  - Dangle patient 3 times a day  - Stand patient 3 times a day  - Ambulate patient 3 times a day  - Out of bed to chair 3 times a day   - Out of bed for meals 3 times a day  - Out of bed for toileting  - Record patient progress and toleration of activity level   Outcome: Progressing     Problem: DISCHARGE PLANNING  Goal: Discharge to home or other facility with appropriate resources  Description: INTERVENTIONS:  - Identify barriers to discharge w/patient and caregiver  - Arrange for needed discharge resources and transportation as appropriate  - Identify discharge learning needs (meds, wound care, etc.)  - Arrange for interpretive services to assist at discharge as needed  - Refer to Case Management Department for coordinating discharge planning if the patient needs post-hospital services based on physician/advanced practitioner order or complex needs related to functional status, cognitive ability, or social support system  Outcome: Progressing     Problem: Knowledge Deficit  Goal: Patient/family/caregiver demonstrates understanding of disease process, treatment plan, medications, and discharge instructions  Description: Complete learning assessment and assess knowledge base.  Interventions:  - Provide teaching at level of understanding  - Provide  teaching via preferred learning methods  Outcome: Progressing     Problem: CARDIOVASCULAR - ADULT  Goal: Absence of cardiac dysrhythmias or at baseline rhythm  Description: INTERVENTIONS:  - Continuous cardiac monitoring, vital signs, obtain 12 lead EKG if ordered  - Administer antiarrhythmic and heart rate control medications as ordered  - Monitor electrolytes and administer replacement therapy as ordered  Outcome: Progressing     Problem: SKIN/TISSUE INTEGRITY - ADULT  Goal: Skin Integrity remains intact(Skin Breakdown Prevention)  Description: Assess:  -Perform Marty assessment every shift  -Clean and moisturize skin every shift  -Inspect skin when repositioning, toileting, and assisting with ADLS  -Assess extremities for adequate circulation and sensation     Bed Management:  -Have minimal linens on bed & keep smooth, unwrinkled  -Change linens as needed when moist or perspiring  -Avoid sitting or lying in one position for more than 4 hours while in bed  -Keep HOB at 30 degrees     Toileting:  -Offer bedside commode  -Assess for incontinence every 2 hours  -Use incontinent care products after each incontinent episode such as wet wipes    Activity:  -Mobilize patient 3 times a day  -Encourage activity and walks on unit  -Encourage or provide ROM exercises   -Turn and reposition patient every 2 Hours  -Use appropriate equipment to lift or move patient in bed  -Instruct/ Assist with weight shifting every 2 hours when out of bed in chair  -Consider limitation of chair time 2 hour intervals    Skin Care:  -Avoid use of baby powder, tape, friction and shearing, hot water or constrictive clothing  -Relieve pressure over bony prominences using wedges  -Do not massage red bony areas    Next Steps:  -Teach patient strategies to minimize risks such as ambulating   -Consider consults to  interdisciplinary teams such as vascular  Outcome: Progressing  Goal: Incision(s), wounds(s) or drain site(s) healing without S/S of  infection  Description: INTERVENTIONS  - Assess and document dressing, incision, wound bed, drain sites and surrounding tissue  - Provide patient and family education  - Perform skin care/dressing changes every shift  Outcome: Progressing  Goal: Pressure injury heals and does not worsen  Description: Interventions:  - Implement low air loss mattress or specialty surface (Criteria met)  - Apply silicone foam dressing  - Instruct/assist with weight shifting every 120 minutes when in chair   - Limit chair time to 2 hour intervals  - Use special pressure reducing interventions such as weight shifting when in chair   - Apply fecal or urinary incontinence containment device   - Perform passive or active ROM every 2 hours  - Turn and reposition patient & offload bony prominences every 2 hours   - Utilize friction reducing device or surface for transfers   - Consider nutrition services referral as needed  Outcome: Progressing     Problem: MUSCULOSKELETAL - ADULT  Goal: Maintain or return mobility to safest level of function  Description: INTERVENTIONS:  - Assess patient's ability to carry out ADLs; assess patient's baseline for ADL function and identify physical deficits which impact ability to perform ADLs (bathing, care of mouth/teeth, toileting, grooming, dressing, etc.)  - Assess/evaluate cause of self-care deficits   - Assess range of motion  - Assess patient's mobility  - Assess patient's need for assistive devices and provide as appropriate  - Encourage maximum independence but intervene and supervise when necessary  - Involve family in performance of ADLs  - Assess for home care needs following discharge   - Consider OT consult to assist with ADL evaluation and planning for discharge  - Provide patient education as appropriate  Outcome: Progressing  Goal: Maintain proper alignment of affected body part  Description: INTERVENTIONS:  - Support, maintain and protect limb and body alignment  - Provide patient/ family  with appropriate education  Outcome: Progressing     Problem: METABOLIC, FLUID AND ELECTROLYTES - ADULT  Goal: Electrolytes maintained within normal limits  Description: INTERVENTIONS:  - Monitor labs and assess patient for signs and symptoms of electrolyte imbalances  - Administer electrolyte replacement as ordered  - Monitor response to electrolyte replacements, including repeat lab results as appropriate  - Instruct patient on fluid and nutrition as appropriate  Outcome: Progressing  Goal: Fluid balance maintained  Description: INTERVENTIONS:  - Monitor labs   - Monitor I/O and WT  - Instruct patient on fluid and nutrition as appropriate  - Assess for signs & symptoms of volume excess or deficit  Outcome: Progressing  Goal: Glucose maintained within target range  Description: INTERVENTIONS:  - Monitor Blood Glucose as ordered  - Assess for signs and symptoms of hyperglycemia and hypoglycemia  - Administer ordered medications to maintain glucose within target range  - Assess nutritional intake and initiate nutrition service referral as needed  Outcome: Progressing     Problem: Prexisting or High Potential for Compromised Skin Integrity  Goal: Skin integrity is maintained or improved  Description: INTERVENTIONS:  - Identify patients at risk for skin breakdown  - Assess and monitor skin integrity  - Assess and monitor nutrition and hydration status  - Monitor labs   - Assess for incontinence   - Turn and reposition patient  - Assist with mobility/ambulation  - Relieve pressure over bony prominences  - Avoid friction and shearing  - Provide appropriate hygiene as needed including keeping skin clean and dry  - Evaluate need for skin moisturizer/barrier cream  - Collaborate with interdisciplinary team   - Patient/family teaching  - Consider wound care consult   Outcome: Progressing

## 2024-02-26 NOTE — PLAN OF CARE
Problem: SAFETY ADULT  Goal: Patient will remain free of falls  Description: INTERVENTIONS:  - Educate patient/family on patient safety including physical limitations  - Instruct patient to call for assistance with activity   - Consult OT/PT to assist with strengthening/mobility   - Keep Call bell within reach  - Keep bed low and locked with side rails adjusted as appropriate  - Keep care items and personal belongings within reach  - Initiate and maintain comfort rounds  - Make Fall Risk Sign visible to staff  - Offer Toileting every 3 Hours, in advance of need  - Initiate/Maintain bed alarm  - Obtain necessary fall risk management equipment: gripper socks  - Apply yellow socks and bracelet for high fall risk patients  - Consider moving patient to room near nurses station  2/26/2024 1148 by Sudha Lynch RN  Outcome: Adequate for Discharge  2/26/2024 1044 by Sudha Lynch RN  Outcome: Progressing  Goal: Maintain or return to baseline ADL function  Description: INTERVENTIONS:  -  Assess patient's ability to carry out ADLs; assess patient's baseline for ADL function and identify physical deficits which impact ability to perform ADLs (bathing, care of mouth/teeth, toileting, grooming, dressing, etc.)  - Assess/evaluate cause of self-care deficits   - Assess range of motion  - Assess patient's mobility; develop plan if impaired  - Assess patient's need for assistive devices and provide as appropriate  - Encourage maximum independence but intervene and supervise when necessary  - Involve family in performance of ADLs  - Assess for home care needs following discharge   - Consider OT consult to assist with ADL evaluation and planning for discharge  - Provide patient education as appropriate  2/26/2024 1148 by Sudha Lynch RN  Outcome: Adequate for Discharge  2/26/2024 1044 by Sudha Lynch RN  Outcome: Progressing  Goal: Maintains/Returns to pre admission functional level  Description: INTERVENTIONS:  - Perform AM-PAC  6 Click Basic Mobility/ Daily Activity assessment daily.  - Set and communicate daily mobility goal to care team and patient/family/caregiver.   - Collaborate with rehabilitation services on mobility goals if consulted  - Perform Range of Motion 3 times a day.  - Reposition patient every 2 hours.  - Dangle patient 3 times a day  - Stand patient 3 times a day  - Ambulate patient 3 times a day  - Out of bed to chair 3 times a day   - Out of bed for meals 3 times a day  - Out of bed for toileting  - Record patient progress and toleration of activity level   2/26/2024 1148 by Sudha Lynch RN  Outcome: Adequate for Discharge  2/26/2024 1044 by Sudha Lynch RN  Outcome: Progressing

## 2024-02-26 NOTE — ANESTHESIA POSTPROCEDURE EVALUATION
Post-Op Assessment Note    CV Status:  Stable    Pain management: adequate       Mental Status:  Alert and awake   Hydration Status:  Euvolemic   PONV Controlled:  Controlled   Airway Patency:  Patent     Post Op Vitals Reviewed: Yes    No anethesia notable event occurred.    Staff: Anesthesiologist               BP      Temp      Pulse     Resp      SpO2

## 2024-02-26 NOTE — PROGRESS NOTES
[facility]  Progress Note  Name: Georgie King I  MRN: [id number]  Unit/Bed#: E2 -01 I Date of Admission: 2/19/2024   Date of Service: 2/26/2024 I Hospital Day: 7    Assessment/Plan   PAD (peripheral artery disease) (HCC)  Assessment & Plan  60 yo female smoker w/ no past medical history presented to [facility] on 2/19/24 w/ R toe pain. Pt was diagnosed w/ distal embolism to R toes likely secondary to atherosclerotic plaque. Pt was seen in consult by cardiology. Echo showed normal EF w/ no evidence of intracardiac thrombus. During her hospital stay, she was newly diagnosed w/ HTN, dyslipidemia, obesity, DM II and severe PAD.     Vascular surgery consulted for R great toe pain. LEAD showed tibioperoneal disease w/ occlusions vs high grade stenoses in the R AT, PT and peroneal arteries, likely secondary to atheroemboli from distal aorta or SFA. CTA abdomen w/ B/L runoff showed noncalcific atherosclerotic plaque involving the distal infrarenal aorta w/ aortic luminal narrowing and R SFA disease. Pt is S/P RLE angiogram in IR on 2/21 w/ an unsuccessful attempt at SFA thrombectomy. Tibial arteries completely thrombosed. Initially successful AT/peroneal thrombectomy rethrombosed without arterial outflow. Attempt at lysis in IR aborted in favor of thrombectomy in OR secondary to rapid worsening of foot. Pt is S/P EVAR, RLE pop/TPT thrombectomy, R SFA stent and PT/AT angioplasty in OR on 2/21.       Diagnostics:  -2/19/24 R great toe xray: Mild soft tissue swelling overlying the first toe with no acute osseous abnormality.   -2/19/24 LEAD: Right: Evidence of diffuse arterial occlusive disease throughout the femoropopliteal segments without focal stenosis. Evidence of tibioperoneal disease with occlusions vs high grade stenoses in the anterior tibial, posterior tibial and peroneal arteries. R EVELIA: 1.39/-/- (poorly compressible vessels). Left: L EVELIA: 1.32/220/110.  -2/19/24 CTA abdomen w/ runoff:  "Large volume of noncalcific atherosclerotic plaque involving the distal infrarenal aorta with aortic luminal narrowing. R SFA disease related to noncalcific plaque. The tibial runoff is poorly evaluated given venous contamination; however, the tibial vessels do appear intact with faint filling of the plantar artery. The dorsalis pedis is not well visualized. No significant left lower extremity arterial disease. Splenic artery occlusion, likely chronic. No evidence of splenic infarct. There is decreased arterial mottling suggestive of decreased perfusion.     Plan:  -R great toe ischemia and pain without sensory/motor deficits  -Likely secondary to atherosclerotic plaque embolism from distal infrarenal aorta/SFA   -S/P EVAR w/ RLE pop/TPT thrombectomy, R SFA stent and PT/AT angioplasty in OR on 2/21 (POD #5)  -B/L groin sites soft w/ small areas of ecchymosis, no erythema or drainage, GRAHAM  -Continue plavix, xarelto 20mg and lipitor for medical management of PAD  -Plan to allow R great toe to demarcate and follow up w/ podiatry as an outpt for possible eventual toe amputation  -Smoking cessation; pt is determined to quit at this time  -Pt is stable for discharge from a vascular surgery standpoint  -Follow up w/ pt in the office; appt scheduled for 3/8/24  -Please contact vascular surgery for any further questions or concerns  -Discussed w/ Dr. Doran      Subjective:  Pt seen for exam while sitting upright in bed, visiting w/ ; NAD. Pt reports increased sensation in R great toe and less pain. Otherwise, pt denies any further complaints at this time.    Vitals:  /55 (BP Location: Left arm)   Pulse 76   Temp 97.5 °F (36.4 °C) (Temporal)   Resp 18   Ht 5' 1\" (1.549 m)   Wt 123 kg (270 lb 1 oz)   SpO2 99%   BMI 51.03 kg/m²     I/Os:  No intake/output data recorded.  No intake/output data recorded.    Lab Results and Cultures:   Lab Results   Component Value Date    WBC 9.23 02/26/2024    HGB 8.7 (L) " 02/26/2024    HCT 28.2 (L) 02/26/2024    MCV 86 02/26/2024     02/26/2024     Lab Results   Component Value Date    GLUCOSE 130 02/21/2024    CALCIUM 8.4 02/26/2024     03/16/2015    K 3.3 (L) 02/26/2024    CO2 24 02/26/2024     02/26/2024    BUN 7 02/26/2024    CREATININE 0.50 (L) 02/26/2024     Lab Results   Component Value Date    INR 1.24 (H) 02/22/2024    INR 1.13 02/21/2024    PROTIME 15.8 (H) 02/22/2024    PROTIME 14.7 (H) 02/21/2024        Medications:  Current Facility-Administered Medications   Medication Dose Route Frequency    acetaminophen (TYLENOL) tablet 975 mg  975 mg Oral Q8H PRN    amLODIPine (NORVASC) tablet 5 mg  5 mg Oral Daily    atorvastatin (LIPITOR) tablet 40 mg  40 mg Oral Daily With Dinner    ceFAZolin (ANCEF) IVPB (premix in dextrose) 2,000 mg 50 mL  2,000 mg Intravenous Once    clopidogrel (PLAVIX) tablet 75 mg  75 mg Oral Daily    [START ON 2/27/2024] glimepiride (AMARYL) tablet 2 mg  2 mg Oral Daily With Breakfast    insulin glargine (LANTUS) subcutaneous injection 8 Units 0.08 mL  8 Units Subcutaneous QAM    insulin lispro (HumaLOG) 100 units/mL subcutaneous injection 2-12 Units  2-12 Units Subcutaneous 4x Daily (AC & HS)    lisinopril (ZESTRIL) tablet 10 mg  10 mg Oral Daily    naloxone (NARCAN) 0.04 mg/mL syringe 0.04 mg  0.04 mg Intravenous Q1MIN PRN    nicotine (NICODERM CQ) 21 mg/24 hr TD 24 hr patch 1 patch  1 patch Transdermal Daily    ondansetron (ZOFRAN) injection 4 mg  4 mg Intravenous Q6H PRN    oxyCODONE (ROXICODONE) split tablet 2.5 mg  2.5 mg Oral Q6H PRN    Or    oxyCODONE (ROXICODONE) IR tablet 5 mg  5 mg Oral Q6H PRN    polyethylene glycol (MIRALAX) packet 17 g  17 g Oral Daily PRN    rivaroxaban (XARELTO) tablet 20 mg  20 mg Oral Daily With Dinner       Imaging:  See above    Physical Exam:    General appearance: alert and oriented, in no acute distress  Skin: Skin color, texture, turgor normal. No rashes or lesions  Neurologic: Grossly  normal  Head: Normocephalic, without obvious abnormality, atraumatic  Lungs: clear to auscultation bilaterally  Heart: regular rate and rhythm, S1, S2 normal, no murmur, click, rub or gallop  Abdomen:  obese, soft, non-tender  Extremities: extremities normal, warm and well-perfused, (+) motor/sensation, R great toe gangrene, trace non-pitting RLE edema improved from prior    Wound/Incision:  B/L groin access sites with small areas of ecchymosis, no erythema, drainage or swelling, sites open to air B/L. R great toe w/ small area of dry gangrene at tip.    Pulse exam:  DP: Right: doppler signal Left: doppler signal  PT: Right: doppler signal Left: doppler signal      KARY Olivo  2/26/2024

## 2024-02-27 ENCOUNTER — TELEPHONE (OUTPATIENT)
Dept: DIABETES SERVICES | Facility: CLINIC | Age: 59
End: 2024-02-27

## 2024-02-27 NOTE — UTILIZATION REVIEW
NOTIFICATION OF ADMISSION DISCHARGE   This is a Notification of Discharge from [facility]. Please be advised that this patient has been discharge from our facility. Below you will find the admission and discharge date and time including the patient’s disposition.   UTILIZATION REVIEW CONTACT:  Machelle Jurado  Utilization   Network Utilization Review Department  Phone: [phone] x carefully listen to the prompts. All voicemails are confidential.  Email: [email or web address]     ADMISSION INFORMATION  PRESENTATION DATE: 2/19/2024  9:44 AM  OBERVATION ADMISSION DATE:   INPATIENT ADMISSION DATE: 2/19/24 12:59 PM   DISCHARGE DATE: 2/26/2024 11:50 AM   DISPOSITION:Home/Self Care    Network Utilization Review Department  ATTENTION: Please call with any questions or concerns to [phone] and carefully listen to the prompts so that you are directed to the right person. All voicemails are confidential.   For Discharge needs, contact Care Management DC Support Team at [phone] opt. 2  Send all requests for admission clinical reviews, approved or denied determinations and any other requests to dedicated fax number below belonging to the campus where the patient is receiving treatment. List of dedicated fax numbers for the Facilities:  FACILITY NAME UR FAX NUMBER   ADMISSION DENIALS (Administrative/Medical Necessity) [phone]   DISCHARGE SUPPORT TEAM ([facility]) [phone]   PARENT CHILD HEALTH (Maternity/NICU/Pediatrics) [phone]   [facility] [phone]   [facility] [phone]   [facility] [phone]   [facility] [phone]   [facility] [phone]   [facility] [phone]   [facility] [phone]   [facility] [phone]   [facility]  [facility] [phone]   [facility] [phone]   [facility] [phone]   [facility] [phone]

## 2024-02-27 NOTE — TELEPHONE ENCOUNTER
Received referral for Georgie King.     Left voicemail for patient to contact office to schedule Consult/New Patient Appointment.

## 2024-02-29 ENCOUNTER — TELEPHONE (OUTPATIENT)
Dept: VASCULAR SURGERY | Facility: CLINIC | Age: 59
End: 2024-02-29

## 2024-02-29 NOTE — TELEPHONE ENCOUNTER
Vascular Nurse Navigator Post Op Phone Call    Post-Discharge phone call attempted to assess patient recovery after vascular surgery I left a message on answering machine. Will attempt to contact at later date.        Pt's chart was reviewed prior to call and leaving message.    Procedure: Right - EMBOLECTOMY/THROMBECTOMY LOWER EXTREMITY Right     Date of Procedure: 2/21/24    Surgeon:   * Linwood Hayes MD - Primary     * Bobo Best MD - Assisting     * Serge Traylor MD - Assisting     * Aditya Rosenberg DO - Fellow    Discharge Date: 2/26/24    Discharge Disposition: Home      Anticoagulation pt was discharged on post op?: Rivaroxaban (Xarelto) and Clopidogrel (Plavix)    Statin pt was discharged on post op?:  Lipitor (atorvastatin)    Reminded pt of the following in message:    NEXT OFFICE VISIT SCHEDULED:  3/8/24 at 11 am with KARY Flores at [facility]    To contact [facility] with any concerns.

## 2024-03-06 NOTE — TELEPHONE ENCOUNTER
Attempted to contact patient and left message to return call.  Also reminded patient of post op appointment on 3/8/24 at 11 am with KARY Flores at [facility].

## 2024-03-08 ENCOUNTER — OFFICE VISIT (OUTPATIENT)
Dept: VASCULAR SURGERY | Facility: CLINIC | Age: 59
End: 2024-03-08

## 2024-03-08 VITALS
OXYGEN SATURATION: 97 % | WEIGHT: 248 LBS | HEART RATE: 93 BPM | SYSTOLIC BLOOD PRESSURE: 136 MMHG | HEIGHT: 61 IN | BODY MASS INDEX: 46.82 KG/M2 | DIASTOLIC BLOOD PRESSURE: 70 MMHG

## 2024-03-08 DIAGNOSIS — I73.9 PAD (PERIPHERAL ARTERY DISEASE) (HCC): ICD-10-CM

## 2024-03-08 DIAGNOSIS — I99.8 ISCHEMIA OF TOE: Primary | ICD-10-CM

## 2024-03-08 PROCEDURE — 99024 POSTOP FOLLOW-UP VISIT: CPT | Performed by: NURSE PRACTITIONER

## 2024-03-08 RX ORDER — CLOPIDOGREL BISULFATE 75 MG/1
75 TABLET ORAL DAILY
Qty: 30 TABLET | Refills: 1 | Status: SHIPPED | OUTPATIENT
Start: 2024-03-08

## 2024-03-08 RX ORDER — ATORVASTATIN CALCIUM 40 MG/1
40 TABLET, FILM COATED ORAL
Qty: 30 TABLET | Refills: 1 | Status: SHIPPED | OUTPATIENT
Start: 2024-03-08

## 2024-03-08 NOTE — Clinical Note
Good afternoon,  I saw Georgie King in the office today. She is doing well. Her surgical b/l groin sites are healing, the left does have a small non-pulsatile seroma. I wanted to double check that we shouldn't get a EVELIA with waveform prior to her appointment with podiatry? Or do they order that? I was questioning the salvageability of her R great toe.  Thank you Have a great weekend Margarita Hector

## 2024-03-08 NOTE — ASSESSMENT & PLAN NOTE
Lab Results   Component Value Date    HGBA1C 12.3 (H) 02/19/2024   -Reviewed most recent HgA1c with pt.  -Pt reports compliance to checking blood sugars three times daily with controlled numbers. Reviewed the importance of maintaining an optimized blood sugar for progression of vascular disease.  -management per PCP

## 2024-03-08 NOTE — LETTER
March 8, 2024     Patient: Georgie King  YOB: 1965  Date of Visit: 3/8/2024      To Whom it May Concern:    Georgie King is under my professional care. Georgie was seen in my office on 3/8/2024. Georgie may return to work on 3/18/24 .    If you have any questions or concerns, please don't hesitate to call.         Sincerely,          KARY Tong        CC: No Recipients

## 2024-03-08 NOTE — PROGRESS NOTES
Assessment/Plan:    Acute ischemia of Right great toe  60 y/o with PMH HTN, HLD, DM, Tobacco Abuses, Ex-lap presenting with Right Toe pain, atheroembolic acute on chronic lower extremity ischemia s/p RLE Angio c attempted lysis catheter placement, mechanical thrombectomy complicated by clinical worsening of RLE ischemia requiring emergent OR for RLE Revascularization on 2/21/24 by .    -Presented to the office accompanied with .  -Denies any complaints of pain in the b/l legs.  -Denies claudication, rest pain, R great toe black eschar with demarcation at mid toe. See clinical image.  -B/L multiphasic doppler signals in DP and PT.  -Has been leaving R toe open to air with no covering, dime size drainage reported on her sheets every night. Given instructions for wound care.  -R groin puncture site completely healed. L groin site is clean and dry, well approximated with <3cm round seroma. Non-pulsatile. No drainage. Denies fever/ chills.    Recommendations  -Instructed to  AOIL with EVELIA's due in 3 months and return to the office in 3 months for follow.  -Follow up with podiatry, appointment on the 3/18/24 to determine salvagability.   -Wound care instructions  -R great toe site, cleanse with saline, paint with betadine and cover with 2' anival wrap. DO NOT SOAK. Go to the ED immediately with any fever/ chills.   -Wash b/l groin site with soap and water, pat dry and call the office with any increase in pain, swelling, discoloration or pain in the legs.   -Refills provided today for Xarelto, plavix and atorvastatin. Blood work script provided to continue with atorvastatin, please complete prior to follow up visit.   -Instructed to establish care with a PCP for the remaining medications needed, office staff assisted her to obtain new patient appointment with PCP on 3/11/24.  -Continue with smoking cessation.  -Blood glucose/ sugar control for wound healing.  -Cc     Tobacco abuse  We did discuss  the relationship between cigarette smoking and atherosclerotic disease along with the importance smoking cessation.     -Continue with smoking cessation. Reports she quit since she was admitted to the hospital. Smoker since she was 10y/o    Type 2 diabetes mellitus, without long-term current use of insulin (HCC)    Lab Results   Component Value Date    HGBA1C 12.3 (H) 02/19/2024   -Reviewed most recent HgA1c with pt.  -Pt reports compliance to checking blood sugars three times daily with controlled numbers. Reviewed the importance of maintaining an optimized blood sugar for progression of vascular disease.  -management per PCP       Diagnoses and all orders for this visit:    Acute ischemia of Right great toe  -     VAS ABDOMINAL AORTA/ILIACS; WITH EVELIA'S; Future  -     Comprehensive metabolic panel; Future    PAD (peripheral artery disease) (HCC)  -     VAS ABDOMINAL AORTA/ILIACS; WITH EVELIA'S; Future  -     rivaroxaban (Xarelto) 20 mg tablet; Take 1 tablet (20 mg total) by mouth daily with breakfast  -     clopidogrel (PLAVIX) 75 mg tablet; Take 1 tablet (75 mg total) by mouth daily  -     atorvastatin (LIPITOR) 40 mg tablet; Take 1 tablet (40 mg total) by mouth daily with dinner  -     Comprehensive metabolic panel; Future          Subjective:      Patient ID: Georgie King is a 59 y.o. female.    Patient is new to our office. She is here for a 2 week f/u exam s/p a RLE embolectomy/thrombectomy with EVAR done 2/21/2024. Patient denies any abd pain or any leg pain. She has a wound on her right great toe. She states that she has occasional drainage from the base of the toe. She denies any pain in the toe.Patient is taking Xarelto, Plavix and Atorvastatin. She is a former smoker.     58 y/o with PMH HTN, HLD, DM, Tobacco Abuses, Ex-lap presenting with Right Toe pain, atheroembolic acute on chronic lower extremity ischemia s/p RLE Angio c attempted lysis catheter placement, mechanical thrombectomy complicated by  "clinical worsening of RLE ischemia requiring emergent OR for RLE Revascularization on 2/21/24.  Denies claudication, rest pain, R foot great toe black eschar. She reports she does have a clear discharge about the size of a dime on her sheets each morning when she wakes up, she is not covering the area with anything or washing it with anything.  She has a 2cm seroma at the left groin site.  Denies fevers/chills.  Going to podiatry on 3/18/24  She has decreased her weight by 20 lbs, testing her blood sugars three times a day. She reports  range for her blood sugars.  Quit smoking the day of admission to the hospital, she reports she used to smoke 5 cig/ day, started when she was 11 years old.           The following portions of the patient's history were reviewed and updated as appropriate: allergies, current medications, past family history, past medical history, past social history, past surgical history, and problem list.    Review of Systems   Constitutional: Negative.    HENT: Negative.     Eyes: Negative.    Respiratory: Negative.  Negative for shortness of breath.    Cardiovascular: Negative.  Negative for chest pain.   Gastrointestinal: Negative.    Endocrine: Negative.    Genitourinary: Negative.    Musculoskeletal: Negative.    Skin:  Positive for color change and wound.   Allergic/Immunologic: Negative.    Neurological: Negative.    Hematological: Negative.    Psychiatric/Behavioral: Negative.           Objective:      /70 (BP Location: Left arm, Patient Position: Sitting, Cuff Size: Large)   Pulse 93   Ht 5' 1\" (1.549 m)   Wt 112 kg (248 lb)   SpO2 97%   BMI 46.86 kg/m²          Physical Exam  Vitals and nursing note reviewed.   Constitutional:       General: She is not in acute distress.     Appearance: Normal appearance. She is obese. She is not ill-appearing.   HENT:      Head: Normocephalic and atraumatic.   Cardiovascular:      Rate and Rhythm: Normal rate and regular rhythm.      " "Pulses:           Dorsalis pedis pulses are detected w/ Doppler on the right side and detected w/ Doppler on the left side.        Posterior tibial pulses are detected w/ Doppler on the right side and detected w/ Doppler on the left side.      Heart sounds: No murmur heard.     Comments: Multiphasic doppler signals.  Pulmonary:      Effort: Pulmonary effort is normal. No respiratory distress.      Breath sounds: Normal breath sounds.   Skin:     General: Skin is warm and dry.      Findings: Erythema (R great toe) present.   Neurological:      General: No focal deficit present.      Mental Status: She is alert and oriented to person, place, and time.      Sensory: No sensory deficit.   Psychiatric:         Mood and Affect: Mood normal.         Behavior: Behavior normal.         I have reviewed and made appropriate changes to the review of systems input by the medical assistant.    Vitals:    03/08/24 1057   BP: 136/70   BP Location: Left arm   Patient Position: Sitting   Cuff Size: Large   Pulse: 93   SpO2: 97%   Weight: 112 kg (248 lb)   Height: 5' 1\" (1.549 m)       Patient Active Problem List   Diagnosis    PAD (peripheral artery disease) (HCC)    Tobacco abuse    Type 2 diabetes mellitus, without long-term current use of insulin (HCC)    Abnormal CT scan    Morbid obesity (HCC)    HTN (hypertension)    Acute ischemia of Right great toe    Dyslipidemia       Past Surgical History:   Procedure Laterality Date    APPENDECTOMY      IR LOWER EXTREMITY ANGIOGRAM  2/21/2024    IR LOWER EXTREMITY ANGIOGRAM  2/21/2024    THROMBECTOMY W/ EMBOLECTOMY Right 2/21/2024    Procedure: EMBOLECTOMY/THROMBECTOMY LOWER EXTREMITY Right;  Surgeon: Linwood Hayes MD;  Location: [facility] OR;  Service: Vascular       History reviewed. No pertinent family history.    Social History     Socioeconomic History    Marital status: /Civil Union     Spouse name: Not on file    Number of children: Not on file    Years of education: " Not on file    Highest education level: Not on file   Occupational History    Not on file   Tobacco Use    Smoking status: Former     Current packs/day: 0.50     Types: Cigarettes    Smokeless tobacco: Never   Substance and Sexual Activity    Alcohol use: Not Currently     Comment: occ    Drug use: Never    Sexual activity: Not on file   Other Topics Concern    Not on file   Social History Narrative    Not on file     Social Determinants of Health     Financial Resource Strain: Not on file   Food Insecurity: No Food Insecurity (2/23/2024)    Hunger Vital Sign     Worried About Running Out of Food in the Last Year: Never true     Ran Out of Food in the Last Year: Never true   Transportation Needs: No Transportation Needs (2/23/2024)    PRAPARE - Transportation     Lack of Transportation (Medical): No     Lack of Transportation (Non-Medical): No   Physical Activity: Not on file   Stress: Not on file   Social Connections: Not on file   Intimate Partner Violence: Not on file   Housing Stability: Low Risk  (2/23/2024)    Housing Stability Vital Sign     Unable to Pay for Housing in the Last Year: No     Number of Places Lived in the Last Year: 1     Unstable Housing in the Last Year: No       No Known Allergies      Current Outpatient Medications:     Alcohol Swabs 70 % PADS, May substitute brand based on insurance coverage. Check glucose ACHS., Disp: 200 each, Rfl: 0    amLODIPine (NORVASC) 5 mg tablet, Take 1 tablet (5 mg total) by mouth daily, Disp: 30 tablet, Rfl: 0    atorvastatin (LIPITOR) 40 mg tablet, Take 1 tablet (40 mg total) by mouth daily with dinner, Disp: 30 tablet, Rfl: 1    Blood Glucose Monitoring Suppl (OneTouch Verio Reflect) w/Device KIT, May substitute brand based on insurance coverage. Check glucose ACHS., Disp: 1 kit, Rfl: 0    clopidogrel (PLAVIX) 75 mg tablet, Take 1 tablet (75 mg total) by mouth daily, Disp: 30 tablet, Rfl: 1    glimepiride (AMARYL) 2 mg tablet, Take 1 tablet (2 mg total) by  mouth daily with breakfast for 30 doses Do not start before February 27, 2024., Disp: 30 tablet, Rfl: 0    glucose blood (OneTouch Verio) test strip, May substitute brand based on insurance coverage. Check glucose ACHS., Disp: 200 each, Rfl: 0    lisinopril (ZESTRIL) 10 mg tablet, Take 1 tablet (10 mg total) by mouth daily Do not start before February 26, 2024., Disp: 30 tablet, Rfl: 0    metFORMIN (GLUCOPHAGE-XR) 750 mg 24 hr tablet, Take 1 tablet (750 mg total) by mouth daily with breakfast, Disp: 30 tablet, Rfl: 0    OneTouch Delica Lancets 33G MISC, May substitute brand based on insurance coverage. Check glucose ACHS., Disp: 200 each, Rfl: 0    rivaroxaban (Xarelto) 20 mg tablet, Take 1 tablet (20 mg total) by mouth daily with breakfast, Disp: 30 tablet, Rfl: 1  I have spent a total time of 45 minutes on 03/08/24 in caring for this patient including Diagnostic results, Risks and benefits of tx options, Instructions for management, Patient and family education, Importance of tx compliance, Counseling / Coordination of care, Documenting in the medical record, Reviewing / ordering tests, medicine, procedures  , and Obtaining or reviewing history  .

## 2024-03-08 NOTE — ASSESSMENT & PLAN NOTE
60 y/o with PMH HTN, HLD, DM, Tobacco Abuses, Ex-lap presenting with Right Toe pain, atheroembolic acute on chronic lower extremity ischemia s/p RLE Angio c attempted lysis catheter placement, mechanical thrombectomy complicated by clinical worsening of RLE ischemia requiring emergent OR for RLE Revascularization on 2/21/24 by .    -Presented to the office accompanied with .  -Denies any complaints of pain in the b/l legs.  -Denies claudication, rest pain, R great toe black eschar with demarcation at mid toe. See clinical image.  -B/L multiphasic doppler signals in DP and PT.  -Has been leaving R toe open to air with no covering, dime size drainage reported on her sheets every night. Given instructions for wound care.  -R groin puncture site completely healed. L groin site is clean and dry, well approximated with <3cm round seroma. Non-pulsatile. No drainage. Denies fever/ chills.    Recommendations  -Instructed to  AOIL with EVELIA's due in 3 months and return to the office in 3 months for follow.  -Follow up with podiatry, appointment on the 3/18/24 to determine salvagability.   -Wound care instructions  -R great toe site, cleanse with saline, paint with betadine and cover with 2' anival wrap. DO NOT SOAK. Go to the ED immediately with any fever/ chills.   -Wash b/l groin site with soap and water, pat dry and call the office with any increase in pain, swelling, discoloration or pain in the legs.   -Refills provided today for Xarelto, plavix and atorvastatin. Blood work script provided to continue with atorvastatin, please complete prior to follow up visit.   -Instructed to establish care with a PCP for the remaining medications needed, office staff assisted her to obtain new patient appointment with PCP on 3/11/24.  -Continue with smoking cessation.  -Blood glucose/ sugar control for wound healing.  -Cc

## 2024-03-08 NOTE — ASSESSMENT & PLAN NOTE
We did discuss the relationship between cigarette smoking and atherosclerotic disease along with the importance smoking cessation.     -Continue with smoking cessation. Reports she quit since she was admitted to the hospital. Smoker since she was 12y/o

## 2024-03-08 NOTE — PATIENT INSTRUCTIONS
-Schedule AOIL with EVELIA's due in 3 months and return to the office in 3 months for follow.    -Continue with follow up with podiatry, appointment on the 3/18/24    -Cleanse R great toe site, with saline, paint with betadine and cover with 2' anival wrap. Go to the ED immediately with any fever/ chills.     -Continue to wash b/l groin site with soap and water, pat dry and call the office with any increase in pain, swelling, discoloration or pain in the legs.     -Refills provided today for Xarelto, plavix and atorvastatin. Blood work script provided to continue with atorvastatin, please complete prior to follow up visit.

## 2024-03-11 ENCOUNTER — TRANSCRIBE ORDERS (OUTPATIENT)
Dept: VASCULAR SURGERY | Facility: CLINIC | Age: 59
End: 2024-03-11

## 2024-03-11 ENCOUNTER — OFFICE VISIT (OUTPATIENT)
Dept: INTERNAL MEDICINE CLINIC | Facility: CLINIC | Age: 59
End: 2024-03-11
Payer: COMMERCIAL

## 2024-03-11 VITALS
WEIGHT: 249 LBS | HEIGHT: 61 IN | RESPIRATION RATE: 20 BRPM | DIASTOLIC BLOOD PRESSURE: 70 MMHG | TEMPERATURE: 97.5 F | HEART RATE: 103 BPM | BODY MASS INDEX: 47.01 KG/M2 | OXYGEN SATURATION: 98 % | SYSTOLIC BLOOD PRESSURE: 136 MMHG

## 2024-03-11 DIAGNOSIS — I10 HTN (HYPERTENSION): ICD-10-CM

## 2024-03-11 DIAGNOSIS — I73.9 PAD (PERIPHERAL ARTERY DISEASE) (HCC): ICD-10-CM

## 2024-03-11 DIAGNOSIS — I99.8 ISCHEMIA OF TOE: ICD-10-CM

## 2024-03-11 DIAGNOSIS — I99.8 ISCHEMIA OF TOE: Primary | ICD-10-CM

## 2024-03-11 DIAGNOSIS — E78.5 DYSLIPIDEMIA: ICD-10-CM

## 2024-03-11 DIAGNOSIS — Z72.0 TOBACCO ABUSE: ICD-10-CM

## 2024-03-11 DIAGNOSIS — E11.9 TYPE 2 DIABETES MELLITUS, WITHOUT LONG-TERM CURRENT USE OF INSULIN (HCC): Primary | ICD-10-CM

## 2024-03-11 PROCEDURE — 99204 OFFICE O/P NEW MOD 45 MIN: CPT | Performed by: INTERNAL MEDICINE

## 2024-03-11 RX ORDER — LISINOPRIL 10 MG/1
10 TABLET ORAL DAILY
Qty: 90 TABLET | Refills: 1 | Status: SHIPPED | OUTPATIENT
Start: 2024-03-11

## 2024-03-11 RX ORDER — GLIMEPIRIDE 2 MG/1
2 TABLET ORAL
Qty: 90 TABLET | Refills: 1 | Status: SHIPPED | OUTPATIENT
Start: 2024-03-11

## 2024-03-11 RX ORDER — METFORMIN HYDROCHLORIDE 750 MG/1
750 TABLET, EXTENDED RELEASE ORAL
Qty: 90 TABLET | Refills: 1 | Status: SHIPPED | OUTPATIENT
Start: 2024-03-11

## 2024-03-11 RX ORDER — AMLODIPINE BESYLATE 5 MG/1
5 TABLET ORAL DAILY
Qty: 90 TABLET | Refills: 1 | Status: SHIPPED | OUTPATIENT
Start: 2024-03-11

## 2024-03-11 NOTE — ASSESSMENT & PLAN NOTE
HbA1c: 12.3 February 2024  Current medications: Metformin, glimepiride  Statin: Yes  Albuminuria/ACEi/ARB: check urine MACR  Foot exam: Up-to-date  Retinopathy: Due for screening, will address at follow-up    New diagnosis, she has been compliant with her metformin and glimepiride.  Declined injectable therapy in the hospital.  She has been monitoring blood glucose at home with fasting readings ranging from   -Provided with handouts on diabetes and nutrition/exercise  -Discussed referral to diabetes education or nutritionist  -Will follow-up in 3 months with repeat labs prior to follow-up

## 2024-03-11 NOTE — PROGRESS NOTES
Patient's shoes and socks removed.    Right Foot/Ankle   Right Foot Inspection  Skin Exam: skin intact and abnormal color (Necrotic appearing right toe).     Toe Exam: right toe deformity.     Sensory   Vibration: intact  Proprioception: intact  Monofilament testing: intact    Left Foot/Ankle  Left Foot Inspection  Skin Exam: skin normal and skin intact. No dry skin, no warmth, no erythema, no maceration, normal color, no pre-ulcer, no ulcer and no callus.     Toe Exam: ROM and strength within normal limits.     Sensory   Vibration: intact  Proprioception: intact  Monofilament testing: intact    Vascular  The left DP pulse is 2+. The left PT pulse is 2+.     Assign Risk Category  Deformity present  Loss of protective sensation  Weak pulses  Risk: 2

## 2024-03-11 NOTE — PROGRESS NOTES
INTERNAL MEDICINE OFFICE VISIT  [facility] Internal Medicine- [place]    NAME: Georgie King  AGE: 59 y.o. SEX: female    DATE OF ENCOUNTER: 3/11/2024    Assessment and Plan/History of Present Illness     Georgie is here today to establish care.  Here today with her , Mir.  She has a son from another marriage who lives in [place].  She and Mir had daughter with cerebral palsy who passed away at age 20.  Georgie works at [employer], she is taking time off work in light of her health issues as below    Medical history of type 2 diabetes, hypertension, dyslipidemia, peripheral arterial disease, nicotine dependence    Patient was admitted at [facility] from 2/19 to 2/26.  Diagnosed with distal embolism to the right toes likely secondary to atherosclerotic plaque.  Seen by cardiology service, had 2D echo with normal LVEF, no evidence of intracardiac thrombus.  Seen by vascular service, LEAD showed evidence of diffuse arterial occlusive disease throughout the femoral-popliteal segments without focal stenosis, evidence of tibioperoneal disease with occlusion versus high-grade stenosis in the anterior tibial, posterior tibial, peroneal arteries on the right.  EVELIA 1.39, poorly compressible vessels.      Underwent right lower extremity angiogram with unsuccessful attempt at SFA thrombectomy.  Patient's tibial arteries completely thrombosed, additional successful AT/peroneal thrombectomy thrombosed without arterial flow.  Now status post EVAR, right lower extremity thrombectomy and right SFA stent placement with angioplasty of the PT/AT    Medical management of PAD with clopidogrel, atorvastatin.  Also started on Xarelto  A1c 12.3 on admission, patient refused insulin therapy, discharged on metformin, Amaryl, glucometer      Allergies:   No Known Allergies   Past medical history:   Past Medical History:   Diagnosis Date   • Acute ischemia of Right great toe 02/21/2024   • Dyslipidemia 02/21/2024   • PAD  (peripheral artery disease) ([facility]) 02/19/2024   • Tobacco abuse 02/19/2024   • Type 2 diabetes mellitus, without long-term current use of insulin ([facility]) 02/19/2024      Past surgical history:   Past Surgical History:   Procedure Laterality Date   • APPENDECTOMY     • IR LOWER EXTREMITY ANGIOGRAM  2/21/2024   • IR LOWER EXTREMITY ANGIOGRAM  2/21/2024   • THROMBECTOMY W/ EMBOLECTOMY Right 2/21/2024    Procedure: EMBOLECTOMY/THROMBECTOMY LOWER EXTREMITY Right;  Surgeon: Linwood Hayes MD;  Location: [facility];  Service: Vascular      Family history:   History reviewed. No pertinent family history.   Social history:   Tobacco Use: Medium Risk (3/11/2024)    Patient History    • Smoking Tobacco Use: Former    • Smokeless Tobacco Use: Never    • Passive Exposure: Not on file      Alcohol Use: Not on file          1. Type 2 diabetes mellitus, without long-term current use of insulin ([facility])  Assessment & Plan:  HbA1c: 12.3 February 2024  Current medications: Metformin, glimepiride  Statin: Yes  Albuminuria/ACEi/ARB: check urine MACR  Foot exam: Up-to-date  Retinopathy: Due for screening, will address at follow-up    New diagnosis, she has been compliant with her metformin and glimepiride.  Declined injectable therapy in the hospital.  She has been monitoring blood glucose at home with fasting readings ranging from   -Provided with handouts on diabetes and nutrition/exercise  -Discussed referral to diabetes education or nutritionist  -Will follow-up in 3 months with repeat labs prior to follow-up      Orders:  -     Albumin / creatinine urine ratio; Future; Expected date: 06/11/2024  -     Comprehensive metabolic panel; Future; Expected date: 06/11/2024  -     Hemoglobin A1C; Future; Expected date: 06/11/2024  -     CBC and differential; Future; Expected date: 06/11/2024  -     Lipid Panel with Direct LDL reflex; Future; Expected date: 06/11/2024  -     metFORMIN (GLUCOPHAGE-XR) 750 mg 24 hr tablet; Take 1 tablet  (750 mg total) by mouth daily with breakfast  -     glimepiride (AMARYL) 2 mg tablet; Take 1 tablet (2 mg total) by mouth daily with breakfast    2. Acute ischemia of Right great toe  Assessment & Plan:  Necrotic appearing right toe on exam.  Follow-up with podiatry scheduled to discuss      3. HTN (hypertension)  Assessment & Plan:  Adequately controlled at this time  Continue amlodipine, lisinopril    Advised her to obtain home BP cuff and monitor periodically at home    Orders:  -     amLODIPine (NORVASC) 5 mg tablet; Take 1 tablet (5 mg total) by mouth daily  -     lisinopril (ZESTRIL) 10 mg tablet; Take 1 tablet (10 mg total) by mouth daily    4. PAD (peripheral artery disease) (HCC)  Assessment & Plan:  Admission February 2024 for distal embolism of the right toe, necrotic appearing right toe.  Status post EVAR/right lower extremity thrombectomy/right SFA stent placement with angioplasty PT/AT  -Continue follow-up with vascular surgery, repeat imaging studies in a few months  -Continue clopidogrel, Xarelto, atorvastatin  -Attention to vascular risk factors      5. Tobacco abuse  Assessment & Plan:  Will readdress at follow-up visit      6. Dyslipidemia               Orders Placed This Encounter   Procedures   • Albumin / creatinine urine ratio   • Comprehensive metabolic panel   • Hemoglobin A1C   • CBC and differential   • Lipid Panel with Direct LDL reflex       Chief Complaint     Chief Complaint   Patient presents with   • Establish Care     Refused zoster        Review of Systems     10 point ROS negative except per HPI    The following portions of the patient's history were reviewed and updated as appropriate: allergies, current medications, past family history, past medical history, past social history, past surgical history and problem list.    Active Problem List     Patient Active Problem List   Diagnosis   • PAD (peripheral artery disease) ([facility])   • Tobacco abuse   • Type 2 diabetes mellitus,  "without long-term current use of insulin (HCC)   • Abnormal CT scan   • Morbid obesity (HCC)   • HTN (hypertension)   • Acute ischemia of Right great toe   • Dyslipidemia       Objective     /70   Pulse 103   Temp 97.5 °F (36.4 °C)   Resp 20   Ht 5' 1\" (1.549 m)   Wt 113 kg (249 lb)   SpO2 98%   BMI 47.05 kg/m²     Physical Exam    Pertinent Laboratory/Diagnostic Studies:  XR chest portable    Result Date: 2/22/2024  Impression: 1. Endotracheal tube terminates 2.9 cm above the james. Nasogastric tube courses below the inferior margin of the study. 2. Bilateral perihilar opacities which are favored to represent central pulmonary vascular congestion versus mild pulmonary edema. Pneumonia may present a similar appearance in the appropriate clinical setting. Workstation performed: [id number]     IR lower extremity angiogram    Result Date: 2/21/2024  Impression: 1. Progressive acute thromboembolic arterial occlusive disease superimposed on chronic arterial occlusive disease of the tibial arteries, with development of complete thrombosis of the infrapopliteal arteries during the procedure prior to intervention. 2. Thrombosis was refractory to maximal endovascular attempts at thrombectomy/thrombolysis.  Worsening acute limb ischemia was observed at the termination of the procedure. 3. Successful placement of LUE basilic vein PICC, cut to 51 cm length and positioned with tip at the SVC confluence.  PICC is ready for immediate use. Plan: Transfer to hybrid OR for operative management. Workstation performed: [id number]     XR toe great min 2 views RIGHT    Result Date: 2/20/2024  Impression: Mild soft tissue swelling overlying the first toe with no acute osseous abnormality. Resident: CHAVA LABOY I, the attending radiologist, have reviewed the images and agree with the final report above. Workstation performed: [id number]     CTA ABDOMEN W RUN OFF W WO CONTRAST    Result Date: 2/19/2024  Impression: " Large volume of noncalcific atherosclerotic plaque involving the distal infrarenal aorta with aortic luminal narrowing. Right SFA disease related to noncalcific plaque. The tibial runoff is poorly evaluated given venous contamination; however, the tibial vessels do appear intact with faint filling of the plantar artery. The dorsalis pedis is not well visualized. No significant left lower extremity arterial disease. Splenic artery occlusion, likely chronic. No evidence of splenic infarct. There is decreased arterial mottling suggestive of decreased perfusion. Prominent left ovary. Nonemergent pelvic ultrasound can be obtained if clinically warranted. Workstation performed: [id number]       Images and diagnostics reviewed     Current Medications     Current Outpatient Medications:   •  Alcohol Swabs 70 % PADS, May substitute brand based on insurance coverage. Check glucose ACHS., Disp: 200 each, Rfl: 0  •  amLODIPine (NORVASC) 5 mg tablet, Take 1 tablet (5 mg total) by mouth daily, Disp: 90 tablet, Rfl: 1  •  atorvastatin (LIPITOR) 40 mg tablet, Take 1 tablet (40 mg total) by mouth daily with dinner, Disp: 30 tablet, Rfl: 1  •  Blood Glucose Monitoring Suppl (OneTouch Verio Reflect) w/Device KIT, May substitute brand based on insurance coverage. Check glucose ACHS., Disp: 1 kit, Rfl: 0  •  clopidogrel (PLAVIX) 75 mg tablet, Take 1 tablet (75 mg total) by mouth daily, Disp: 30 tablet, Rfl: 1  •  glimepiride (AMARYL) 2 mg tablet, Take 1 tablet (2 mg total) by mouth daily with breakfast, Disp: 90 tablet, Rfl: 1  •  glucose blood (OneTouch Verio) test strip, May substitute brand based on insurance coverage. Check glucose ACHS., Disp: 200 each, Rfl: 0  •  lisinopril (ZESTRIL) 10 mg tablet, Take 1 tablet (10 mg total) by mouth daily, Disp: 90 tablet, Rfl: 1  •  metFORMIN (GLUCOPHAGE-XR) 750 mg 24 hr tablet, Take 1 tablet (750 mg total) by mouth daily with breakfast, Disp: 90 tablet, Rfl: 1  •  OneTouch Delica Lancets 33G  MISC, May substitute brand based on insurance coverage. Check glucose ACHS., Disp: 200 each, Rfl: 0  •  rivaroxaban (Xarelto) 20 mg tablet, Take 1 tablet (20 mg total) by mouth daily with breakfast, Disp: 30 tablet, Rfl: 1    Health Maintenance     Health Maintenance   Topic Date Due   • Hepatitis C Screening  Never done   • Kidney Health Evaluation: Albumin/Creatinine Ratio  Never done   • Pneumococcal Vaccine: Pediatrics (0 to 5 Years) and At-Risk Patients (6 to 64 Years) (1 of 2 - PCV) Never done   • DM Eye Exam  Never done   • HIV Screening  Never done   • Annual Physical  Never done   • DTaP,Tdap,and Td Vaccines (1 - Tdap) Never done   • Cervical Cancer Screening  Never done   • Breast Cancer Screening: Mammogram  Never done   • Colorectal Cancer Screening  Never done   • Zoster Vaccine (1 of 2) Never done   • COVID-19 Vaccine (1 - 2023-24 season) Never done   • HEMOGLOBIN A1C  08/19/2024   • Kidney Health Evaluation: GFR  02/26/2025   • Depression Screening  03/11/2025   • Diabetic Foot Exam  03/11/2025   • Influenza Vaccine  Completed   • HIB Vaccine  Aged Out   • IPV Vaccine  Aged Out   • Hepatitis A Vaccine  Aged Out   • Meningococcal ACWY Vaccine  Aged Out   • HPV Vaccine  Aged Out     Immunization History   Administered Date(s) Administered   • INFLUENZA 10/25/2023       Bassem Barton D.O.  [facility] Internal Medicine - [address]  [place] [address]  Office: [phone]  Fax: [phone]

## 2024-03-11 NOTE — ASSESSMENT & PLAN NOTE
Adequately controlled at this time  Continue amlodipine, lisinopril    Advised her to obtain home BP cuff and monitor periodically at home

## 2024-03-11 NOTE — ASSESSMENT & PLAN NOTE
Admission February 2024 for distal embolism of the right toe, necrotic appearing right toe.  Status post EVAR/right lower extremity thrombectomy/right SFA stent placement with angioplasty PT/AT  -Continue follow-up with vascular surgery, repeat imaging studies in a few months  -Continue clopidogrel, Xarelto, atorvastatin  -Attention to vascular risk factors

## 2024-03-18 ENCOUNTER — OFFICE VISIT (OUTPATIENT)
Dept: PODIATRY | Facility: CLINIC | Age: 59
End: 2024-03-18
Payer: COMMERCIAL

## 2024-03-18 VITALS — DIASTOLIC BLOOD PRESSURE: 80 MMHG | SYSTOLIC BLOOD PRESSURE: 158 MMHG | HEART RATE: 64 BPM

## 2024-03-18 DIAGNOSIS — I96 GANGRENE OF TOE OF RIGHT FOOT (HCC): Primary | ICD-10-CM

## 2024-03-18 DIAGNOSIS — E11.51 DIABETES MELLITUS TYPE 2 WITH PERIPHERAL ARTERY DISEASE (HCC): ICD-10-CM

## 2024-03-18 PROCEDURE — 99204 OFFICE O/P NEW MOD 45 MIN: CPT | Performed by: PODIATRIST

## 2024-03-18 NOTE — PROGRESS NOTES
Assessment/Plan:       Diagnoses and all orders for this visit:    Gangrene of toe of right foot (HCC)  -     Case request operating room: AMPUTATION great toe right foot; Standing  -     Case request operating room: AMPUTATION great toe right foot  -     Cam Boot    Diabetes mellitus type 2 with peripheral artery disease (HCC)  -     Cam Boot    Other orders  -     Incentive spirometry; Standing  -     Insert and maintain IV line; Standing  -     Void On-Call to O.R.; Standing  -     Insert peripheral IV; Standing  -     Place sequential compression device; Standing        Diagnosis and options discussed with patient  Patient agreeable to the plan as stated below    Consent was signed for right great toe amputation. I reviewed her recent angiogram, there is a palpable DP pulse now. She has quit smoking    Surgical procedure and recovery discussed as can best be predicted.  Alternatives, risks, complications covered with the patient.    Appropriate postop medication discussed, educated patient on narcotic medication and its risks.     Patient will be sent for preoperative testing and clearance    Patient doen not need DVT prophylaxis for this procedure    -DM foot risk is high. Recommend at risk foot care (Q8)  -Discussed DM risk to lower extremities, proper shoe gear, and daily monitoring of feet.   -Discussed weight loss and suitable exercise regiment.   -Educated on A1C and the risks of poorly controlled Diabetes. Reviewed recent A1C:  Lab Results   Component Value Date    HGBA1C 12.3 (H) 02/19/2024   .       Subjective:      Patient ID: Georgie King is a 59 y.o. female.    Patient has a black toe. She has severe PAD, recent blockage and emergent angiogram. Her great toe is black. She says she quit smoking.She is diabetic        The following portions of the patient's history were reviewed and updated as appropriate: allergies, current medications, past family history, past medical history, past social history,  past surgical history, and problem list.    Review of Systems    As stated in HPI, otherwise normal      Objective:      /80 (BP Location: Left arm, Patient Position: Sitting, Cuff Size: Standard)   Pulse 64          Physical Exam  Vitals reviewed.   Cardiovascular:      Pulses: Pulses are weak.           Dorsalis pedis pulses are 2+ on the right side.        Posterior tibial pulses are 1+ on the right side.   Musculoskeletal:        Feet:    Feet:      Right foot:      Protective Sensation:   0 sites sensed.      Left foot:      Protective Sensation:   0 sites sensed.   Neurological:      Mental Status: She is alert.       Diabetic Foot Exam    Patient's shoes and socks removed.    Right Foot/Ankle   Right Foot Inspection  Skin Exam: abnormal color.     Toe Exam: swelling.     Sensory   Vibration: absent  Monofilament testing: absent    Vascular  Capillary refills: < 3 seconds  The right DP pulse is 2+. The right PT pulse is 1+.       Assign Risk Category  Deformity present  Loss of protective sensation  Weak pulses  Risk: 3

## 2024-03-18 NOTE — LETTER
March 18, 2024     Patient: Georgie King  YOB: 1965  Date of Visit: 3/18/2024      To Whom it May Concern:    Georgie King is under my professional care. Georgie was seen in my office on 3/18/2024. Georgie is unable to work due to her foot infection. She must be home resting. She will be out of work for the next 2-3 months.    If you have any questions or concerns, please don't hesitate to call.         Sincerely,          Alberto Roberts DPM        CC: No Recipients

## 2024-03-21 ENCOUNTER — TELEPHONE (OUTPATIENT)
Dept: PODIATRY | Facility: CLINIC | Age: 59
End: 2024-03-21

## 2024-03-22 DIAGNOSIS — Z01.818 PRE-OP EVALUATION: Primary | ICD-10-CM

## 2024-04-01 ENCOUNTER — HOSPITAL ENCOUNTER (INPATIENT)
Facility: HOSPITAL | Age: 59
LOS: 3 days | Discharge: HOME | DRG: 256 | End: 2024-04-04
Attending: EMERGENCY MEDICINE | Admitting: PODIATRIST
Payer: COMMERCIAL

## 2024-04-01 ENCOUNTER — ANESTHESIA EVENT (INPATIENT)
Dept: PERIOP | Facility: HOSPITAL | Age: 59
DRG: 256 | End: 2024-04-01
Payer: COMMERCIAL

## 2024-04-01 ENCOUNTER — APPOINTMENT (INPATIENT)
Dept: NON INVASIVE DIAGNOSTICS | Facility: HOSPITAL | Age: 59
DRG: 256 | End: 2024-04-01
Payer: COMMERCIAL

## 2024-04-01 ENCOUNTER — APPOINTMENT (EMERGENCY)
Dept: RADIOLOGY | Facility: HOSPITAL | Age: 59
DRG: 256 | End: 2024-04-01
Payer: COMMERCIAL

## 2024-04-01 DIAGNOSIS — I96 GANGRENE OF TOE OF RIGHT FOOT (HCC): Primary | ICD-10-CM

## 2024-04-01 DIAGNOSIS — I73.9 PAD (PERIPHERAL ARTERY DISEASE) (HCC): ICD-10-CM

## 2024-04-01 DIAGNOSIS — I96 GANGRENE OF TOE OF RIGHT FOOT (HCC): ICD-10-CM

## 2024-04-01 DIAGNOSIS — L03.031 CELLULITIS OF GREAT TOE, RIGHT: ICD-10-CM

## 2024-04-01 LAB
ALBUMIN SERPL BCP-MCNC: 4.1 G/DL (ref 3.5–5)
ALP SERPL-CCNC: 89 U/L (ref 34–104)
ALT SERPL W P-5'-P-CCNC: 11 U/L (ref 7–52)
ANION GAP SERPL CALCULATED.3IONS-SCNC: 10 MMOL/L (ref 4–13)
APTT PPP: 55 SECONDS (ref 23–37)
AST SERPL W P-5'-P-CCNC: 9 U/L (ref 13–39)
ATRIAL RATE: 87 BPM
BASOPHILS # BLD AUTO: 0.06 THOUSANDS/ÂΜL (ref 0–0.1)
BASOPHILS NFR BLD AUTO: 1 % (ref 0–1)
BILIRUB SERPL-MCNC: 0.25 MG/DL (ref 0.2–1)
BUN SERPL-MCNC: 14 MG/DL (ref 5–25)
CALCIUM SERPL-MCNC: 9.4 MG/DL (ref 8.4–10.2)
CHLORIDE SERPL-SCNC: 108 MMOL/L (ref 96–108)
CO2 SERPL-SCNC: 22 MMOL/L (ref 21–32)
CREAT SERPL-MCNC: 0.76 MG/DL (ref 0.6–1.3)
EOSINOPHIL # BLD AUTO: 0.16 THOUSAND/ÂΜL (ref 0–0.61)
EOSINOPHIL NFR BLD AUTO: 1 % (ref 0–6)
ERYTHROCYTE [DISTWIDTH] IN BLOOD BY AUTOMATED COUNT: 14.7 % (ref 11.6–15.1)
GFR SERPL CREATININE-BSD FRML MDRD: 85 ML/MIN/1.73SQ M
GLUCOSE SERPL-MCNC: 114 MG/DL (ref 65–140)
GLUCOSE SERPL-MCNC: 116 MG/DL (ref 65–140)
GLUCOSE SERPL-MCNC: 174 MG/DL (ref 65–140)
HCT VFR BLD AUTO: 35.2 % (ref 34.8–46.1)
HGB BLD-MCNC: 10.9 G/DL (ref 11.5–15.4)
IMM GRANULOCYTES # BLD AUTO: 0.03 THOUSAND/UL (ref 0–0.2)
IMM GRANULOCYTES NFR BLD AUTO: 0 % (ref 0–2)
INR PPP: 1.63 (ref 0.84–1.19)
LACTATE SERPL-SCNC: 1.5 MMOL/L (ref 0.5–2)
LYMPHOCYTES # BLD AUTO: 3.31 THOUSANDS/ÂΜL (ref 0.6–4.47)
LYMPHOCYTES NFR BLD AUTO: 29 % (ref 14–44)
MCH RBC QN AUTO: 26.2 PG (ref 26.8–34.3)
MCHC RBC AUTO-ENTMCNC: 31 G/DL (ref 31.4–37.4)
MCV RBC AUTO: 85 FL (ref 82–98)
MONOCYTES # BLD AUTO: 0.62 THOUSAND/ÂΜL (ref 0.17–1.22)
MONOCYTES NFR BLD AUTO: 5 % (ref 4–12)
NEUTROPHILS # BLD AUTO: 7.39 THOUSANDS/ÂΜL (ref 1.85–7.62)
NEUTS SEG NFR BLD AUTO: 64 % (ref 43–75)
NRBC BLD AUTO-RTO: 0 /100 WBCS
P AXIS: 56 DEGREES
PLATELET # BLD AUTO: 432 THOUSANDS/UL (ref 149–390)
PMV BLD AUTO: 10.3 FL (ref 8.9–12.7)
POTASSIUM SERPL-SCNC: 3.8 MMOL/L (ref 3.5–5.3)
PR INTERVAL: 180 MS
PROCALCITONIN SERPL-MCNC: <0.05 NG/ML
PROT SERPL-MCNC: 7.8 G/DL (ref 6.4–8.4)
PROTHROMBIN TIME: 19.5 SECONDS (ref 11.6–14.5)
QRS AXIS: 58 DEGREES
QRSD INTERVAL: 90 MS
QT INTERVAL: 388 MS
QTC INTERVAL: 466 MS
RBC # BLD AUTO: 4.16 MILLION/UL (ref 3.81–5.12)
SODIUM SERPL-SCNC: 140 MMOL/L (ref 135–147)
T WAVE AXIS: 26 DEGREES
VENTRICULAR RATE: 87 BPM
WBC # BLD AUTO: 11.57 THOUSAND/UL (ref 4.31–10.16)

## 2024-04-01 PROCEDURE — 73660 X-RAY EXAM OF TOE(S): CPT

## 2024-04-01 PROCEDURE — 87040 BLOOD CULTURE FOR BACTERIA: CPT | Performed by: EMERGENCY MEDICINE

## 2024-04-01 PROCEDURE — 36415 COLL VENOUS BLD VENIPUNCTURE: CPT | Performed by: EMERGENCY MEDICINE

## 2024-04-01 PROCEDURE — 99284 EMERGENCY DEPT VISIT MOD MDM: CPT

## 2024-04-01 PROCEDURE — 80053 COMPREHEN METABOLIC PANEL: CPT | Performed by: EMERGENCY MEDICINE

## 2024-04-01 PROCEDURE — 99223 1ST HOSP IP/OBS HIGH 75: CPT | Performed by: SURGERY

## 2024-04-01 PROCEDURE — 85025 COMPLETE CBC W/AUTO DIFF WBC: CPT | Performed by: EMERGENCY MEDICINE

## 2024-04-01 PROCEDURE — 99285 EMERGENCY DEPT VISIT HI MDM: CPT | Performed by: EMERGENCY MEDICINE

## 2024-04-01 PROCEDURE — 99223 1ST HOSP IP/OBS HIGH 75: CPT | Performed by: PODIATRIST

## 2024-04-01 PROCEDURE — NC001 PR NO CHARGE: Performed by: PODIATRIST

## 2024-04-01 PROCEDURE — 83605 ASSAY OF LACTIC ACID: CPT | Performed by: EMERGENCY MEDICINE

## 2024-04-01 PROCEDURE — 93922 UPR/L XTREMITY ART 2 LEVELS: CPT | Performed by: SURGERY

## 2024-04-01 PROCEDURE — 84145 PROCALCITONIN (PCT): CPT | Performed by: EMERGENCY MEDICINE

## 2024-04-01 PROCEDURE — 93925 LOWER EXTREMITY STUDY: CPT | Performed by: SURGERY

## 2024-04-01 PROCEDURE — 85610 PROTHROMBIN TIME: CPT | Performed by: EMERGENCY MEDICINE

## 2024-04-01 PROCEDURE — 93010 ELECTROCARDIOGRAM REPORT: CPT | Performed by: INTERNAL MEDICINE

## 2024-04-01 PROCEDURE — 85730 THROMBOPLASTIN TIME PARTIAL: CPT | Performed by: EMERGENCY MEDICINE

## 2024-04-01 PROCEDURE — 82948 REAGENT STRIP/BLOOD GLUCOSE: CPT

## 2024-04-01 PROCEDURE — 93926 LOWER EXTREMITY STUDY: CPT

## 2024-04-01 PROCEDURE — 93005 ELECTROCARDIOGRAM TRACING: CPT

## 2024-04-01 RX ORDER — GLIMEPIRIDE 2 MG/1
2 TABLET ORAL
Status: DISCONTINUED | OUTPATIENT
Start: 2024-04-02 | End: 2024-04-04 | Stop reason: HOSPADM

## 2024-04-01 RX ORDER — LISINOPRIL 10 MG/1
10 TABLET ORAL DAILY
Status: DISCONTINUED | OUTPATIENT
Start: 2024-04-01 | End: 2024-04-04 | Stop reason: HOSPADM

## 2024-04-01 RX ORDER — AMLODIPINE BESYLATE 5 MG/1
5 TABLET ORAL DAILY
Status: DISCONTINUED | OUTPATIENT
Start: 2024-04-01 | End: 2024-04-04 | Stop reason: HOSPADM

## 2024-04-01 RX ORDER — INSULIN LISPRO 100 [IU]/ML
1-6 INJECTION, SOLUTION INTRAVENOUS; SUBCUTANEOUS
Status: DISCONTINUED | OUTPATIENT
Start: 2024-04-01 | End: 2024-04-04 | Stop reason: HOSPADM

## 2024-04-01 RX ORDER — ATORVASTATIN CALCIUM 40 MG/1
40 TABLET, FILM COATED ORAL
Status: DISCONTINUED | OUTPATIENT
Start: 2024-04-01 | End: 2024-04-04 | Stop reason: HOSPADM

## 2024-04-01 RX ORDER — SODIUM CHLORIDE 9 MG/ML
50 INJECTION, SOLUTION INTRAVENOUS CONTINUOUS
Status: DISCONTINUED | OUTPATIENT
Start: 2024-04-01 | End: 2024-04-03

## 2024-04-01 RX ORDER — CLOPIDOGREL BISULFATE 75 MG/1
75 TABLET ORAL DAILY
Status: DISCONTINUED | OUTPATIENT
Start: 2024-04-01 | End: 2024-04-04 | Stop reason: HOSPADM

## 2024-04-01 RX ORDER — HEPARIN SODIUM 5000 [USP'U]/ML
5000 INJECTION, SOLUTION INTRAVENOUS; SUBCUTANEOUS EVERY 8 HOURS SCHEDULED
Status: DISCONTINUED | OUTPATIENT
Start: 2024-04-01 | End: 2024-04-04 | Stop reason: HOSPADM

## 2024-04-01 RX ORDER — CEFTRIAXONE 2 G/50ML
2000 INJECTION, SOLUTION INTRAVENOUS ONCE
Status: COMPLETED | OUTPATIENT
Start: 2024-04-01 | End: 2024-04-01

## 2024-04-01 RX ORDER — SODIUM CHLORIDE, SODIUM GLUCONATE, SODIUM ACETATE, POTASSIUM CHLORIDE, MAGNESIUM CHLORIDE, SODIUM PHOSPHATE, DIBASIC, AND POTASSIUM PHOSPHATE .53; .5; .37; .037; .03; .012; .00082 G/100ML; G/100ML; G/100ML; G/100ML; G/100ML; G/100ML; G/100ML
1000 INJECTION, SOLUTION INTRAVENOUS ONCE
Status: COMPLETED | OUTPATIENT
Start: 2024-04-01 | End: 2024-04-01

## 2024-04-01 RX ADMIN — SODIUM CHLORIDE 50 ML/HR: 0.9 INJECTION, SOLUTION INTRAVENOUS at 13:59

## 2024-04-01 RX ADMIN — SODIUM CHLORIDE, SODIUM GLUCONATE, SODIUM ACETATE, POTASSIUM CHLORIDE, MAGNESIUM CHLORIDE, SODIUM PHOSPHATE, DIBASIC, AND POTASSIUM PHOSPHATE 1000 ML: .53; .5; .37; .037; .03; .012; .00082 INJECTION, SOLUTION INTRAVENOUS at 11:56

## 2024-04-01 RX ADMIN — CEFTRIAXONE 2000 MG: 2 INJECTION, SOLUTION INTRAVENOUS at 11:55

## 2024-04-01 RX ADMIN — VANCOMYCIN HYDROCHLORIDE 1750 MG: 1 INJECTION, POWDER, LYOPHILIZED, FOR SOLUTION INTRAVENOUS at 12:31

## 2024-04-01 RX ADMIN — ATORVASTATIN CALCIUM 40 MG: 40 TABLET, FILM COATED ORAL at 17:23

## 2024-04-01 RX ADMIN — INSULIN LISPRO 1 UNITS: 100 INJECTION, SOLUTION INTRAVENOUS; SUBCUTANEOUS at 17:23

## 2024-04-01 NOTE — PLAN OF CARE
Problem: SKIN/TISSUE INTEGRITY - ADULT  Goal: Skin Integrity remains intact(Skin Breakdown Prevention)  Description: Assess:  -Perform Marty assessment every .  -Clean and moisturize skin every .  -Inspect skin when repositioning, toileting, and assisting with ADLS  -Assess under medical devices such as . every .  -Assess extremities for adequate circulation and sensation     Bed Management:  -Have minimal linens on bed & keep smooth, unwrinkled  -Change linens as needed when moist or perspiring  -Avoid sitting or lying in one position for more than . hours while in bed  -Keep HOB at .degrees     Toileting:  -Offer bedside commode  -Assess for incontinence every .  -Use incontinent care products after each incontinent episode such as .    Activity:  -Mobilize patient .. times a day  -Encourage activity and walks on unit  -Encourage or provide ROM exercises   -Turn and reposition patient every . Hours  -Use appropriate equipment to lift or move patient in bed  -Instruct/ Assist with weight shifting every . when out of bed in chair  -Consider limitation of chair time . hour intervals    Skin Care:  -Avoid use of baby powder, tape, friction and shearing, hot water or constrictive clothing  -Relieve pressure over bony prominences using .  -Do not massage red bony areas    Next Steps:  -Teach patient strategies to minimize risks such as .   -Consider consults to  interdisciplinary teams such as .  Outcome: Progressing  Goal: Incision(s), wounds(s) or drain site(s) healing without S/S of infection  Description: INTERVENTIONS  - Assess and document dressing, incision, wound bed, drain sites and surrounding tissue  - Provide patient and family education  - Perform skin care/dressing changes every .  Outcome: Progressing  Goal: Pressure injury heals and does not worsen  Description: Interventions:  - Implement low air loss mattress or specialty surface (Criteria met)  - Apply silicone foam dressing  - Instruct/assist with  weight shifting every . minutes when in chair   - Limit chair time to . hour intervals  - Use special pressure reducing interventions such as . when in chair   - Apply fecal or urinary incontinence containment device   - Perform passive or active ROM every .  - Turn and reposition patient & offload bony prominences every . hours   - Utilize friction reducing device or surface for transfers   - Consider consults to  interdisciplinary teams such as .  - Use incontinent care products after each incontinent episode such as .  - Consider nutrition services referral as needed  Outcome: Progressing

## 2024-04-01 NOTE — ASSESSMENT & PLAN NOTE
59-year-old female with HTN, HLD, former smoker, DM (hemoglobin A1c 12.3), PAD who presented in February with acute right toe pain and discoloration concerning for atheroembolic phenomenon s/p angiogram with acute thrombosis of the right tibial and popliteal arteries s/p EVAR to cover infrarenal aortic atherothrombotic lesion, RLE pop/TPT thrombectomy, R SFA stent and PT/AT angioplasty by Dr Hayes 2/21/24.  Right hallux was given time to demarcate.  Patient presents to the ED today purulent drainage from base of right great toe.  Podiatry is planning right hallux amputation tomorrow.  Vascular is consulted for healing potential    Diagnostics:  LEAD pending  Right foot x-ray showed no acute fracture or dislocation, some degenerative arthritis of the midfoot, no lytic or blastic osseous lesions, atrophy of the soft tissues of the great toe and distal phalanx    Recommendations:  -Right hallux gangrene w/ underlying peripheral arterial occlusive disease and atheroembolic phenomenon status post revascularization February 2024  -On plavix, Xarelto 20mg daily, atorvastatin  -Quit smoking since surgery   -2+ femoral pulses  -Arterial duplex being performed at bedside to evaluate baseline arterial perfusion postintervention  -Will give patient upon completion of testing  -Will discuss with Dr. Doran

## 2024-04-01 NOTE — CONSULTS
[facility]  Consult  Name: Georgie King 59 y.o. female I MRN: [id number]  Unit/Bed#: [address] 223-01 I Date of Admission: 4/1/2024   Date of Service: 4/1/2024  Hospital Day: 0    Inpatient consult to Vascular Surgery  Consult performed by: KARY Menjivar  Consult ordered by: Summer Pool DPM      Assessment/Plan   PAD (peripheral artery disease) ([facility])  Assessment & Plan  59-year-old female with HTN, HLD, former smoker, DM (hemoglobin A1c 12.3), PAD who presented in February with acute right toe pain and discoloration concerning for atheroembolic phenomenon s/p angiogram with acute thrombosis of the right tibial and popliteal arteries s/p EVAR to cover infrarenal aortic atherothrombotic lesion, RLE pop/TPT thrombectomy, R SFA stent and PT/AT angioplasty by Dr Hayes 2/21/24.  Right hallux was given time to demarcate.  Patient presents to the ED today purulent drainage from base of right great toe.  Podiatry is planning right hallux amputation tomorrow.  Vascular is consulted for healing potential    Diagnostics:  LEAD pending  Right foot x-ray showed no acute fracture or dislocation, some degenerative arthritis of the midfoot, no lytic or blastic osseous lesions, atrophy of the soft tissues of the great toe and distal phalanx    Recommendations:  -Right hallux gangrene w/ underlying peripheral arterial occlusive disease and atheroembolic phenomenon status post revascularization February 2024  -On plavix, Xarelto 20mg daily, atorvastatin  -Quit smoking since surgery   -2+ femoral pulses  -Arterial duplex being performed at bedside to evaluate baseline arterial perfusion postintervention  -Will give patient upon completion of testing  -Will discuss with Dr. Doran       __________________________________________________________    Consulting Service: Podiatry     Chief Complaint: Right 1st toe drainage    HPI: Georgie King is a 59 y.o. female who presents to the ED  today with drainage from right great toe.  She has extensive gangrene of the right hallux.  Patient has history of atheroembolic event in February underwent angiogram with acute intra procedural thrombosis s/p EVAR to cover infrarenal aortic atheroembolic lesion and right popliteal and tibial peroneal trunk thromboembolectomy, right SFA stent and right PT/AT angioplasty 2/21/2024.  Right hallux was cyanotic and has progressed to dry gangrene.  Patient is on Plavix Xarelto and atorvastatin.  She was planned for right hallux amputation at the end of the month though presented early with drainage and localized cellulitis.  She denies any fevers or chills.  Blood cultures done in the ED.  She is ordered for lower extremity arterial duplex which is being done at current time.  Patient quit smoking since last admission.  She is also watching her blood sugars closely.    Review of Systems:  General: negative  Cardiovascular: no chest pain or dyspnea on exertion  Respiratory: no cough, shortness of breath, or wheezing  Gastrointestinal: no abdominal pain, change in bowel habits, or black or bloody stools  Genitourinary ROS: no dysuria, trouble voiding, or hematuria  Musculoskeletal ROS: negative  Neurological ROS: no TIA or stroke symptoms  Hematological and Lymphatic ROS: negative  Dermatological ROS: negative  Psychological ROS: negative  Ophthalmic ROS: negative  ENT ROS: negative    Past Medical History:  Past Medical History:   Diagnosis Date    Acute ischemia of Right great toe 02/21/2024    Dyslipidemia 02/21/2024    PAD (peripheral artery disease) ([facility]) 02/19/2024    Tobacco abuse 02/19/2024    Type 2 diabetes mellitus, without long-term current use of insulin ([facility]) 02/19/2024       Past Surgical History:  Past Surgical History:   Procedure Laterality Date    APPENDECTOMY      IR LOWER EXTREMITY ANGIOGRAM  2/21/2024    IR LOWER EXTREMITY ANGIOGRAM  2/21/2024    THROMBECTOMY W/ EMBOLECTOMY Right 2/21/2024     Procedure: EMBOLECTOMY/THROMBECTOMY LOWER EXTREMITY Right;  Surgeon: Linwood Hayes MD;  Location: [facility];  Service: Vascular       Social History:  Social History     Substance and Sexual Activity   Alcohol Use Not Currently    Comment: occ     Social History     Substance and Sexual Activity   Drug Use Never     Social History     Tobacco Use   Smoking Status Former    Current packs/day: 0.50    Types: Cigarettes   Smokeless Tobacco Never       Family History:  History reviewed. No pertinent family history.    Allergies:  No Known Allergies    Medications:  Current Facility-Administered Medications   Medication Dose Route Frequency    amLODIPine (NORVASC) tablet 5 mg  5 mg Oral Daily    atorvastatin (LIPITOR) tablet 40 mg  40 mg Oral Daily With Dinner    clopidogrel (PLAVIX) tablet 75 mg  75 mg Oral Daily    [START ON 4/2/2024] glimepiride (AMARYL) tablet 2 mg  2 mg Oral Daily With Breakfast    heparin (porcine) subcutaneous injection 5,000 Units  5,000 Units Subcutaneous Q8H OMID    insulin lispro (HumALOG/ADMELOG) 100 units/mL subcutaneous injection 1-6 Units  1-6 Units Subcutaneous TID AC    lisinopril (ZESTRIL) tablet 10 mg  10 mg Oral Daily    sodium chloride 0.9 % infusion  50 mL/hr Intravenous Continuous       Vitals:  Vitals:    04/01/24 1335   BP: 121/76   Pulse: 87   Resp: 18   Temp: 97.7 °F (36.5 °C)   SpO2: 99%       I/Os:  No intake/output data recorded.  I/O this shift:  In: 50 [IV Piggyback:50]  Out: -     Lab Results and Cultures:   CBC with diff:   Lab Results   Component Value Date    WBC 11.57 (H) 04/01/2024    HGB 10.9 (L) 04/01/2024    HCT 35.2 04/01/2024    MCV 85 04/01/2024     (H) 04/01/2024    RBC 4.16 04/01/2024    MCH 26.2 (L) 04/01/2024    MCHC 31.0 (L) 04/01/2024    RDW 14.7 04/01/2024    MPV 10.3 04/01/2024    NRBC 0 04/01/2024   ,   BMP/CMP:  Lab Results   Component Value Date     03/16/2015    K 3.8 04/01/2024    K 3.4 (L) 03/16/2015     04/01/2024    CL  "106 03/16/2015    CO2 22 04/01/2024    CO2 19 (L) 02/21/2024    ANIONGAP 11 03/16/2015    BUN 14 04/01/2024    BUN 13 03/16/2015    CREATININE 0.76 04/01/2024    CREATININE 0.81 03/16/2015    GLUCOSE 130 02/21/2024    GLUCOSE 111 03/16/2015    CALCIUM 9.4 04/01/2024    CALCIUM 8.0 (L) 03/16/2015    AST 9 (L) 04/01/2024    AST 12 03/16/2015    ALT 11 04/01/2024    ALT 13 03/16/2015    ALKPHOS 89 04/01/2024    ALKPHOS 74 03/16/2015    PROT 6.4 03/16/2015    BILITOT 0.3 03/16/2015    EGFR 85 04/01/2024   ,   Lipid Panel: No results found for: \"CHOL\",   Coags:   Lab Results   Component Value Date    PTT 55 (H) 04/01/2024    INR 1.63 (H) 04/01/2024   ,     Blood Culture: No results found for: \"BLOODCX\",   Urinalysis:   Lab Results   Component Value Date    COLORU Yellow 03/15/2015    CLARITYU Clear 03/15/2015    SPECGRAV >=1.030 03/15/2015    PHUR 6.0 03/15/2015    LEUKOCYTESUR Negative 03/15/2015    NITRITE Negative 03/15/2015    PROTEINUA 30 (1+) (A) 03/15/2015    GLUCOSEU Negative 03/15/2015    KETONESU 15 (1+) (A) 03/15/2015    BILIRUBINUR Negative 03/15/2015    BLOODU Trace (A) 03/15/2015   ,   Urine Culture: No results found for: \"URINECX\",   Wound Culure: No results found for: \"WOUNDCULT\"    Imaging:  LEAD pending     Physical Exam:    General appearance: alert and oriented, in no acute distress  Head: Normocephalic, without obvious abnormality, atraumatic  Eyes: EOMI  Throat: Poor dentition  Neck: no JVD and supple, symmetrical, trachea midline  Back: negative, symmetric, no curvature. ROM normal. No CVA tenderness.  Lungs: clear to auscultation bilaterally  Chest wall: no tenderness  Heart: regular rate and rhythm  Abdomen: soft, non-tender; bowel sounds normal; no masses,  no organomegaly  Genitalia: deferred  Rectal: deferred  Extremities:  Bilateral groin access site healed completely.  2+ femoral pulses.  Right hallux dry gangrene.  Motor and sensation intact  Skin: Skin color, texture, turgor normal. No " rashes or lesions  Neurologic: Grossly normal    Wound/Incision:  Right foot dressing clean dry and intact            Pulse exam:  Femoral: Right: 2+ Left: 2+  RLE Arterial duplex being performed at bedside at time of exam   L DP 1+    KARY Heredia  4/1/2024

## 2024-04-01 NOTE — PROGRESS NOTES
[facility] Podiatry - H&P  Georgie King 59 y.o. female MRN: [id number]  Unit/Bed#: ED-11 Encounter: [id number]  Admission Date: 04/01/24    ASSESSMENT:    Georgie King is a 59 y.o. female with:    Gangrene of Right great toe.   Cellulitis Right great toe  PAD  - S/p agram 2/21/24  History of tobacco use.     PLAN:    Plan for amputation of Hallux Right this admission.  F/u blood cultures and labs  SLIM consulted.   Will discuss with Vascular.   Start Abx  F/u LEAD's  Plan for OR based on surgeon availability   Will discuss this plan with my attending and update as needed.      Antibiotics started: Ancef  Pharmacologic VTE Prophylaxis: Heparin   Mechanical VTE Prophylaxis: sequential compression device   Weight Bearing Status: WBAT    Disposition:  Patient requires >2 midnight stay for surgical intervention and work-up.  Code Status: Prior      SUBJECTIVE    History of Present Illness:    Georgie King is a 59 y.o. female with pmh of PAD, Tobacco abuse,  who presents with gangrene of the right great toe progressing over the last 2 months. Patient reports purulent drainage from the toe this morning. She is tender to the touch and red around the big toe. Denies F/C/N/V  She had a previous planned surgery for 4/26/23 with Armando.      Review of Systems:    Negative except for what is in the HPI.     Past Medical and Surgical History:     Past Medical History:   Diagnosis Date    Acute ischemia of Right great toe 02/21/2024    Dyslipidemia 02/21/2024    PAD (peripheral artery disease) ([facility]) 02/19/2024    Tobacco abuse 02/19/2024    Type 2 diabetes mellitus, without long-term current use of insulin ([facility]) 02/19/2024       Past Surgical History:   Procedure Laterality Date    APPENDECTOMY      IR LOWER EXTREMITY ANGIOGRAM  2/21/2024    IR LOWER EXTREMITY ANGIOGRAM  2/21/2024    THROMBECTOMY W/ EMBOLECTOMY Right 2/21/2024    Procedure: EMBOLECTOMY/THROMBECTOMY LOWER EXTREMITY Right;  Surgeon: Linwood Hayes,  MD;  Location: [facility] OR;  Service: Vascular       Meds/Allergies:    (Not in a hospital admission)      No Known Allergies    Social History:    Social History     Marital Status: /Civil Union    Substance Use History:   Social History     Substance and Sexual Activity   Alcohol Use Not Currently    Comment: occ     Social History     Tobacco Use   Smoking Status Former    Current packs/day: 0.50    Types: Cigarettes   Smokeless Tobacco Never     Social History     Substance and Sexual Activity   Drug Use Never       Family History:    History reviewed. No pertinent family history.      OBJECTIVE:    First Vitals:   Blood Pressure: (!) 184/103 (04/01/24 1048)  Pulse: (!) 120 (04/01/24 1048)  Temperature: 98 °F (36.7 °C) (04/01/24 1048)  Temp Source: Oral (04/01/24 1048)  Respirations: 22 (04/01/24 1048)  SpO2: 98 % (04/01/24 1048)    Current Vitals:   Blood Pressure: (!) 184/103 (04/01/24 1048)  Pulse: (!) 120 (04/01/24 1048)  Temperature: 98 °F (36.7 °C) (04/01/24 1048)  Temp Source: Oral (04/01/24 1048)  Respirations: 22 (04/01/24 1048)  SpO2: 98 % (04/01/24 1048)      Physical Exam:    General Appearance: Alert, cooperative, no distress.  HEENT: Head normocephalic, atraumatic, without obvious abnormality.  Heart: Normal rate and rhythm.  Lungs: Non-labored breathing. No respiratory distress.  Abdomen: Without distension.  Psychiatric: AAOx3  Lower Extremity:  Vascular:   DP/PT pulses are dopplerable.  Good capillary refill noted to the first metatarsal.  No capillary refill noted to the great toe.  Negative for edema  Mild increase in warmth surrounding great toe with some edema 1+.    Musculoskeletal:  Mild tenderness noted to the right great toe region.  No tenderness bilateral otherwise.    Dermatological:  Clear demarcation of the right great toe gangrene just distal to the first metatarsal phalangeal joint.  Surrounding skin is slightly erythematous with mild increase in temperature.  No active  purulence noted at bedside.    Neurological:  Gross sensation is diminished. Protective sensation is absent.   Clinical Images 04/01/24:            Lab Results:   No visits with results within 1 Day(s) from this visit.   Latest known visit with results is:   No results displayed because visit has over 200 results.          Cultures:            Imaging: I have personally reviewed pertinent films in PACS  No results found.  EKG, Pathology, and Other Studies: I have personally reviewed pertinent reports.

## 2024-04-01 NOTE — ED PROVIDER NOTES
History  Chief Complaint   Patient presents with    Toe Pain     Wound to R great toe, pus this morning. No fevers/chills.      A 58 yo female with pmhx of Dyslipidemia, PAD and DM; presents with purulent drainage from the base of her right great toe.  Patient states symptoms started this morning, along with redness and increased pain.  Patient has otherwise not had fever, chills, chest pain, shortness of breath, abdominal pain, nausea, vomiting, diarrhea and peripheral edema.  She reports compliance with all of her medications.      History provided by:  Patient, medical records and spouse  Toe Pain  Associated symptoms: rash      Review of Records  Admit to [facility] from 2/19-26 for right toe pain  Distal embolism to the right toes, found to have PAD s/p EVAR, right lower extremity thrombectomy and right SFA stent placement with angioplasty of the PT/AT  Discharged on Xarelto, plavix and statin  Newly diagnosed DM - started on metformin and Glimepiride  Outpatient follow up with podiatry on 3/18 - plan for right great toe amputation (scheduled for 4/26)    Prior to Admission Medications   Prescriptions Last Dose Informant Patient Reported? Taking?   Alcohol Swabs 70 % PADS  Self No Yes   Sig: May substitute brand based on insurance coverage. Check glucose ACHS.   Blood Glucose Monitoring Suppl (OneTouch Verio Reflect) w/Device KIT  Self No Yes   Sig: May substitute brand based on insurance coverage. Check glucose ACHS.   OneTouch Delica Lancets 33G MISC  Self No Yes   Sig: May substitute brand based on insurance coverage. Check glucose ACHS.   amLODIPine (NORVASC) 5 mg tablet   No Yes   Sig: Take 1 tablet (5 mg total) by mouth daily   atorvastatin (LIPITOR) 40 mg tablet   No Yes   Sig: Take 1 tablet (40 mg total) by mouth daily with dinner   clopidogrel (PLAVIX) 75 mg tablet   No Yes   Sig: Take 1 tablet (75 mg total) by mouth daily   glimepiride (AMARYL) 2 mg tablet   No Yes   Sig: Take 1 tablet (2 mg total) by mouth  daily with breakfast   glucose blood (OneTouch Verio) test strip  Self No Yes   Sig: May substitute brand based on insurance coverage. Check glucose ACHS.   lisinopril (ZESTRIL) 10 mg tablet   No Yes   Sig: Take 1 tablet (10 mg total) by mouth daily   metFORMIN (GLUCOPHAGE-XR) 750 mg 24 hr tablet   No Yes   Sig: Take 1 tablet (750 mg total) by mouth daily with breakfast   rivaroxaban (Xarelto) 20 mg tablet   No Yes   Sig: Take 1 tablet (20 mg total) by mouth daily with breakfast      Facility-Administered Medications: None       Past Medical History:   Diagnosis Date    Acute ischemia of Right great toe 02/21/2024    Dyslipidemia 02/21/2024    PAD (peripheral artery disease) ([facility]) 02/19/2024    Tobacco abuse 02/19/2024    Type 2 diabetes mellitus, without long-term current use of insulin ([facility]) 02/19/2024       Past Surgical History:   Procedure Laterality Date    APPENDECTOMY      IR LOWER EXTREMITY ANGIOGRAM  2/21/2024    IR LOWER EXTREMITY ANGIOGRAM  2/21/2024    THROMBECTOMY W/ EMBOLECTOMY Right 2/21/2024    Procedure: EMBOLECTOMY/THROMBECTOMY LOWER EXTREMITY Right;  Surgeon: Linwood Hayes MD;  Location: [facility] OR;  Service: Vascular       History reviewed. No pertinent family history.  I have reviewed and agree with the history as documented.    E-Cigarette/Vaping    E-Cigarette Use Never User      E-Cigarette/Vaping Substances     Social History     Tobacco Use    Smoking status: Former     Current packs/day: 0.50     Types: Cigarettes    Smokeless tobacco: Never   Vaping Use    Vaping status: Never Used   Substance Use Topics    Alcohol use: Not Currently     Comment: occ    Drug use: Never       Review of Systems   Skin:  Positive for rash and wound.   All other systems reviewed and are negative.      Physical Exam  Physical Exam  General Appearance: alert and oriented, nad, non toxic appearing  Skin:  Warm, dry.  No cyanosis.  Right great toe necrotic with purulent drainage along base of the toe,  erythema noted at the base of the toe.        HEENT: Atraumatic, normocephalic.  No eye drainage.  Normal hearing.  Moist mucous membranes.    Neck: Supple, trachea midline  Cardiac: RRR; no murmurs, rub, gallops.  No pedal edema, 2+ pulses  Pulmonary: lungs CTAB; no wheezes, rales, rhonchi  Gastrointestinal: abdomen soft, nontender, nondistended; no guarding or rebound tenderness; good bowel sounds, no mass or bruits  Extremities:  No deformities.  No calf tenderness, no clubbing  Neuro:  no focal motor or sensory deficits, CN 2-12 grossly intact  Psych:  Normal mood and affect, normal judgement and insight      Vital Signs  ED Triage Vitals [04/01/24 1048]   Temperature Pulse Respirations Blood Pressure SpO2   98 °F (36.7 °C) (!) 120 22 (!) 184/103 98 %      Temp Source Heart Rate Source Patient Position - Orthostatic VS BP Location FiO2 (%)   Oral Monitor Sitting Right arm --      Pain Score       No Pain           Vitals:    04/01/24 1048 04/01/24 1200 04/01/24 1335 04/01/24 1550   BP: (!) 184/103 144/74 121/76 150/68   Pulse: (!) 120 92 87 87   Patient Position - Orthostatic VS: Sitting            Visual Acuity      ED Medications  Medications   heparin (porcine) subcutaneous injection 5,000 Units (5,000 Units Subcutaneous Not Given 4/1/24 1358)   insulin lispro (HumALOG/ADMELOG) 100 units/mL subcutaneous injection 1-6 Units ( Subcutaneous Not Given 4/1/24 1358)   amLODIPine (NORVASC) tablet 5 mg (5 mg Oral Not Given 4/1/24 1352)   atorvastatin (LIPITOR) tablet 40 mg (has no administration in time range)   clopidogrel (PLAVIX) tablet 75 mg (75 mg Oral Not Given 4/1/24 1352)   glimepiride (AMARYL) tablet 2 mg (has no administration in time range)   lisinopril (ZESTRIL) tablet 10 mg (10 mg Oral Not Given 4/1/24 1353)   sodium chloride 0.9 % infusion (50 mL/hr Intravenous New Bag 4/1/24 1359)   cefTRIAXone (ROCEPHIN) IVPB (premix in dextrose) 2,000 mg 50 mL (0 mg Intravenous Stopped 4/1/24 4548)   vancomycin  (VANCOCIN) 1,750 mg in sodium chloride 0.9 % 500 mL IVPB (1,750 mg Intravenous New Bag 4/1/24 1231)   multi-electrolyte (ISOLYTE-S PH 7.4) bolus 1,000 mL (1,000 mL Intravenous New Bag 4/1/24 1156)       Diagnostic Studies  Results Reviewed       Procedure Component Value Units Date/Time    Lactic acid [[id number]]  (Normal) Collected: 04/01/24 1133    Lab Status: Final result Specimen: Blood from Arm, Right Updated: 04/01/24 1238     LACTIC ACID 1.5 mmol/L     Narrative:      Result may be elevated if tourniquet was used during collection.    Comprehensive metabolic panel [[id number]]  (Abnormal) Collected: 04/01/24 1133    Lab Status: Final result Specimen: Blood from Arm, Right Updated: 04/01/24 1234     Sodium 140 mmol/L      Potassium 3.8 mmol/L      Chloride 108 mmol/L      CO2 22 mmol/L      ANION GAP 10 mmol/L      BUN 14 mg/dL      Creatinine 0.76 mg/dL      Glucose 116 mg/dL      Calcium 9.4 mg/dL      AST 9 U/L      ALT 11 U/L      Alkaline Phosphatase 89 U/L      Total Protein 7.8 g/dL      Albumin 4.1 g/dL      Total Bilirubin 0.25 mg/dL      eGFR 85 ml/min/1.73sq m     Narrative:      National Kid[employer] Foundation guidelines for Chronic Kidney Disease (CKD):     Stage 1 with normal or high GFR (GFR > 90 mL/min/1.73 square meters)    Stage 2 Mild CKD (GFR = 60-89 mL/min/1.73 square meters)    Stage 3A Moderate CKD (GFR = 45-59 mL/min/1.73 square meters)    Stage 3B Moderate CKD (GFR = 30-44 mL/min/1.73 square meters)    Stage 4 Severe CKD (GFR = 15-29 mL/min/1.73 square meters)    Stage 5 End Stage CKD (GFR <15 mL/min/1.73 square meters)  Note: GFR calculation is accurate only with a steady state creatinine    Procalcitonin [[id number]]  (Normal) Collected: 04/01/24 1133    Lab Status: Final result Specimen: Blood from Arm, Right Updated: 04/01/24 1217     Procalcitonin <0.05 ng/ml     APTT [[id number]]  (Abnormal) Collected: 04/01/24 1133    Lab Status: Final result Specimen: Blood from Arm, Right  Updated: 04/01/24 1211     PTT 55 seconds     Protime-INR [[id number]]  (Abnormal) Collected: 04/01/24 1133    Lab Status: Final result Specimen: Blood from Arm, Right Updated: 04/01/24 1211     Protime 19.5 seconds      INR 1.63    CBC and differential [[id number]]  (Abnormal) Collected: 04/01/24 1133    Lab Status: Final result Specimen: Blood from Arm, Right Updated: 04/01/24 1150     WBC 11.57 Thousand/uL      RBC 4.16 Million/uL      Hemoglobin 10.9 g/dL      Hematocrit 35.2 %      MCV 85 fL      MCH 26.2 pg      MCHC 31.0 g/dL      RDW 14.7 %      MPV 10.3 fL      Platelets 432 Thousands/uL      nRBC 0 /100 WBCs      Neutrophils Relative 64 %      Immature Grans % 0 %      Lymphocytes Relative 29 %      Monocytes Relative 5 %      Eosinophils Relative 1 %      Basophils Relative 1 %      Neutrophils Absolute 7.39 Thousands/µL      Absolute Immature Grans 0.03 Thousand/uL      Absolute Lymphocytes 3.31 Thousands/µL      Absolute Monocytes 0.62 Thousand/µL      Eosinophils Absolute 0.16 Thousand/µL      Basophils Absolute 0.06 Thousands/µL     Blood culture #1 [[id number]] Collected: 04/01/24 1142    Lab Status: In process Specimen: Blood from Arm, Left Updated: 04/01/24 1145    Blood culture #2 [[id number]] Collected: 04/01/24 1133    Lab Status: In process Specimen: Blood from Arm, Right Updated: 04/01/24 1145                   XR toe great min 2 views RIGHT   ED Interpretation by Jaye Dubose DO (04/01 1146)   Blu erosion of the distal great toe, consistent with likely osteo    Image independently interpreted by myself       Final Result by Miguel Giraldo MD (04/01 1345)      Noticeable interval change in the appearance of the soft tissues of the great toe which now appear either atrophied or debrided.      The underlying bone appears intact.            Workstation performed: [id number]         VAS ARTERIAL DUPLEX-LOWER LIMB UNILATERAL    (Results Pending)              Procedures  Procedures   ECG  12 Lead Documentation  Date/Time: today/date: 4/1/2024  Performed by: Jaye Dubose    ECG reviewed by me, the ED Provider: yes    Patient location:  ED   Previous ECG:  Compared to current, no change   Rate:  87  ECG rate assessment: normal    Rhythm: sinus rhythm    Ectopy:  none    QRS axis:  Normal  Intervals: normal   Q waves: None   ST segments:  Normal  T waves: normal      Impression: Normal EKG       ED Course  ED Course as of 04/01/24 1659   Mon Apr 01, 2024   1111 Podiatry to see in the ED   1143 Podiatry to admit for likely amputation tomorrow   1153 Hemoglobin(!): 10.9  Improved from prior   1239 Remainder of labs WNL                                             Medical Decision Making  A 58 yo female presents with purulent drainage from that right great toe which started this morning.  Exam consistent with necrotic right great toe, now acutely infected with surrounding erythema.  Will obtain sepsis work up including XR to rule out osteomyelitis.  Will start IV Abx.  Will discuss with podiatry.    Amount and/or Complexity of Data Reviewed  Labs: ordered. Decision-making details documented in ED Course.  Radiology: ordered and independent interpretation performed.    Risk  Prescription drug management.  Decision regarding hospitalization.             Disposition  Final diagnoses:   Gangrene of toe of right foot (HCC) - right great toe   Cellulitis of great toe, right     Time reflects when diagnosis was documented in both MDM as applicable and the Disposition within this note       Time User Action Codes Description Comment    4/1/2024 11:47 AM Jaye Dubose Add [I96] Gangrene of toe of right foot (HCC)     4/1/2024 11:47 AM Jaye Dubose Modify [I96] Gangrene of toe of right foot (HCC) right great toe    4/1/2024 11:47 AM Jaye Dubose Add [L03.031] Cellulitis of great toe, right     4/1/2024 12:19 PM Summer Pool Add [I73.9] PAD (peripheral artery disease) ([facility])           ED Disposition        ED Disposition   Admit    Condition   Stable    Date/Time   Mon Apr 1, 2024 11:47 AM    Comment   Case was discussed with Podiatry and the patient's admission status was agreed to be Admission Status: inpatient status to the service of Dr. Roberts.               Follow-up Information    None         Current Discharge Medication List        CONTINUE these medications which have NOT CHANGED    Details   Alcohol Swabs 70 % PADS May substitute brand based on insurance coverage. Check glucose ACHS.  Qty: 200 each, Refills: 0    Associated Diagnoses: Type 2 diabetes mellitus, without long-term current use of insulin ([facility])      amLODIPine (NORVASC) 5 mg tablet Take 1 tablet (5 mg total) by mouth daily  Qty: 90 tablet, Refills: 1    Associated Diagnoses: HTN (hypertension)      atorvastatin (LIPITOR) 40 mg tablet Take 1 tablet (40 mg total) by mouth daily with dinner  Qty: 30 tablet, Refills: 1    Associated Diagnoses: PAD (peripheral artery disease) ([facility])      Blood Glucose Monitoring Suppl (OneTouch Verio Reflect) w/Device KIT May substitute brand based on insurance coverage. Check glucose ACHS.  Qty: 1 kit, Refills: 0    Associated Diagnoses: Type 2 diabetes mellitus, without long-term current use of insulin ([facility])      clopidogrel (PLAVIX) 75 mg tablet Take 1 tablet (75 mg total) by mouth daily  Qty: 30 tablet, Refills: 1    Associated Diagnoses: PAD (peripheral artery disease) ([facility])      glimepiride (AMARYL) 2 mg tablet Take 1 tablet (2 mg total) by mouth daily with breakfast  Qty: 90 tablet, Refills: 1    Associated Diagnoses: Type 2 diabetes mellitus, without long-term current use of insulin ([facility])      glucose blood (OneTouch Verio) test strip May substitute brand based on insurance coverage. Check glucose ACHS.  Qty: 200 each, Refills: 0    Associated Diagnoses: Type 2 diabetes mellitus, without long-term current use of insulin ([facility])      lisinopril (ZESTRIL) 10 mg tablet Take 1 tablet (10 mg total) by mouth  daily  Qty: 90 tablet, Refills: 1    Associated Diagnoses: HTN (hypertension)      metFORMIN (GLUCOPHAGE-XR) 750 mg 24 hr tablet Take 1 tablet (750 mg total) by mouth daily with breakfast  Qty: 90 tablet, Refills: 1    Associated Diagnoses: Type 2 diabetes mellitus, without long-term current use of insulin (HCC)      OneTouch Delica Lancets 33G MISC May substitute brand based on insurance coverage. Check glucose ACHS.  Qty: 200 each, Refills: 0    Associated Diagnoses: Type 2 diabetes mellitus, without long-term current use of insulin (HCC)      rivaroxaban (Xarelto) 20 mg tablet Take 1 tablet (20 mg total) by mouth daily with breakfast  Qty: 30 tablet, Refills: 1    Comments: DO NOT FILL. PRICE CHECK ONLY.  Associated Diagnoses: PAD (peripheral artery disease) ([facility])             No discharge procedures on file.    PDMP Review       None            ED Provider  Electronically Signed by             Jaye Dbuose DO  04/01/24 5966

## 2024-04-01 NOTE — TREATMENT PLAN
Will plan for tentatively plan for OR tomorrow 4/2/24 for hallux amputation.  - F/u Vascular recommendations  - NPO midnight  - consent signed and in the OR

## 2024-04-02 ENCOUNTER — APPOINTMENT (INPATIENT)
Dept: RADIOLOGY | Facility: HOSPITAL | Age: 59
DRG: 256 | End: 2024-04-02
Payer: COMMERCIAL

## 2024-04-02 ENCOUNTER — ANESTHESIA (INPATIENT)
Dept: PERIOP | Facility: HOSPITAL | Age: 59
DRG: 256 | End: 2024-04-02
Payer: COMMERCIAL

## 2024-04-02 PROBLEM — Z01.810 PREOP CARDIOVASCULAR EXAM: Status: ACTIVE | Noted: 2024-04-02

## 2024-04-02 LAB
GLUCOSE SERPL-MCNC: 103 MG/DL (ref 65–140)
GLUCOSE SERPL-MCNC: 104 MG/DL (ref 65–140)
GLUCOSE SERPL-MCNC: 122 MG/DL (ref 65–140)
GLUCOSE SERPL-MCNC: 93 MG/DL (ref 65–140)

## 2024-04-02 PROCEDURE — 28820 AMPUTATION OF TOE: CPT | Performed by: PODIATRIST

## 2024-04-02 PROCEDURE — 99232 SBSQ HOSP IP/OBS MODERATE 35: CPT | Performed by: NURSE PRACTITIONER

## 2024-04-02 PROCEDURE — NC001 PR NO CHARGE: Performed by: PODIATRIST

## 2024-04-02 PROCEDURE — 82948 REAGENT STRIP/BLOOD GLUCOSE: CPT

## 2024-04-02 PROCEDURE — 73630 X-RAY EXAM OF FOOT: CPT

## 2024-04-02 PROCEDURE — 88311 DECALCIFY TISSUE: CPT | Performed by: SPECIALIST

## 2024-04-02 PROCEDURE — 88305 TISSUE EXAM BY PATHOLOGIST: CPT | Performed by: SPECIALIST

## 2024-04-02 PROCEDURE — 99254 IP/OBS CNSLTJ NEW/EST MOD 60: CPT | Performed by: HOSPITALIST

## 2024-04-02 RX ORDER — MAGNESIUM HYDROXIDE 1200 MG/15ML
LIQUID ORAL AS NEEDED
Status: DISCONTINUED | OUTPATIENT
Start: 2024-04-02 | End: 2024-04-02 | Stop reason: HOSPADM

## 2024-04-02 RX ORDER — PROPOFOL 10 MG/ML
INJECTION, EMULSION INTRAVENOUS AS NEEDED
Status: DISCONTINUED | OUTPATIENT
Start: 2024-04-02 | End: 2024-04-02

## 2024-04-02 RX ORDER — OXYCODONE HYDROCHLORIDE 5 MG/1
5 TABLET ORAL EVERY 4 HOURS PRN
Status: DISCONTINUED | OUTPATIENT
Start: 2024-04-02 | End: 2024-04-04 | Stop reason: HOSPADM

## 2024-04-02 RX ORDER — ACETAMINOPHEN 325 MG/1
650 TABLET ORAL EVERY 4 HOURS PRN
Status: DISCONTINUED | OUTPATIENT
Start: 2024-04-02 | End: 2024-04-04 | Stop reason: HOSPADM

## 2024-04-02 RX ORDER — ONDANSETRON 2 MG/ML
INJECTION INTRAMUSCULAR; INTRAVENOUS AS NEEDED
Status: DISCONTINUED | OUTPATIENT
Start: 2024-04-02 | End: 2024-04-02

## 2024-04-02 RX ORDER — CEFAZOLIN SODIUM 2 G/50ML
2000 SOLUTION INTRAVENOUS
Status: DISPENSED | OUTPATIENT
Start: 2024-04-02 | End: 2024-04-03

## 2024-04-02 RX ORDER — CEFAZOLIN SODIUM 2 G/50ML
SOLUTION INTRAVENOUS AS NEEDED
Status: DISCONTINUED | OUTPATIENT
Start: 2024-04-02 | End: 2024-04-02

## 2024-04-02 RX ORDER — PROPOFOL 10 MG/ML
INJECTION, EMULSION INTRAVENOUS CONTINUOUS PRN
Status: DISCONTINUED | OUTPATIENT
Start: 2024-04-02 | End: 2024-04-02

## 2024-04-02 RX ORDER — SODIUM CHLORIDE 9 MG/ML
125 INJECTION, SOLUTION INTRAVENOUS CONTINUOUS
Status: DISCONTINUED | OUTPATIENT
Start: 2024-04-02 | End: 2024-04-02

## 2024-04-02 RX ORDER — FENTANYL CITRATE 50 UG/ML
INJECTION, SOLUTION INTRAMUSCULAR; INTRAVENOUS AS NEEDED
Status: DISCONTINUED | OUTPATIENT
Start: 2024-04-02 | End: 2024-04-02

## 2024-04-02 RX ORDER — LIDOCAINE HYDROCHLORIDE 20 MG/ML
INJECTION, SOLUTION EPIDURAL; INFILTRATION; INTRACAUDAL; PERINEURAL AS NEEDED
Status: DISCONTINUED | OUTPATIENT
Start: 2024-04-02 | End: 2024-04-02

## 2024-04-02 RX ORDER — KETAMINE HYDROCHLORIDE 50 MG/ML
INJECTION, SOLUTION INTRAMUSCULAR; INTRAVENOUS AS NEEDED
Status: DISCONTINUED | OUTPATIENT
Start: 2024-04-02 | End: 2024-04-02

## 2024-04-02 RX ORDER — MIDAZOLAM HYDROCHLORIDE 2 MG/2ML
INJECTION, SOLUTION INTRAMUSCULAR; INTRAVENOUS AS NEEDED
Status: DISCONTINUED | OUTPATIENT
Start: 2024-04-02 | End: 2024-04-02

## 2024-04-02 RX ORDER — ONDANSETRON 2 MG/ML
4 INJECTION INTRAMUSCULAR; INTRAVENOUS ONCE AS NEEDED
Status: DISCONTINUED | OUTPATIENT
Start: 2024-04-02 | End: 2024-04-02 | Stop reason: HOSPADM

## 2024-04-02 RX ORDER — FENTANYL CITRATE/PF 50 MCG/ML
25 SYRINGE (ML) INJECTION
Status: COMPLETED | OUTPATIENT
Start: 2024-04-02 | End: 2024-04-02

## 2024-04-02 RX ADMIN — MIDAZOLAM 2 MG: 1 INJECTION INTRAMUSCULAR; INTRAVENOUS at 16:14

## 2024-04-02 RX ADMIN — PROPOFOL 80 MCG/KG/MIN: 10 INJECTION, EMULSION INTRAVENOUS at 16:25

## 2024-04-02 RX ADMIN — OXYCODONE 5 MG: 5 TABLET ORAL at 19:57

## 2024-04-02 RX ADMIN — FENTANYL CITRATE 50 MCG: 50 INJECTION INTRAMUSCULAR; INTRAVENOUS at 16:25

## 2024-04-02 RX ADMIN — FENTANYL CITRATE 25 MCG: 50 INJECTION INTRAMUSCULAR; INTRAVENOUS at 16:38

## 2024-04-02 RX ADMIN — CLOPIDOGREL BISULFATE 75 MG: 75 TABLET ORAL at 07:58

## 2024-04-02 RX ADMIN — KETAMINE HYDROCHLORIDE 10 MG: 50 INJECTION INTRAMUSCULAR; INTRAVENOUS at 16:27

## 2024-04-02 RX ADMIN — ATORVASTATIN CALCIUM 40 MG: 40 TABLET, FILM COATED ORAL at 17:57

## 2024-04-02 RX ADMIN — SODIUM CHLORIDE 50 ML/HR: 0.9 INJECTION, SOLUTION INTRAVENOUS at 17:57

## 2024-04-02 RX ADMIN — SODIUM CHLORIDE 125 ML/HR: 0.9 INJECTION, SOLUTION INTRAVENOUS at 15:47

## 2024-04-02 RX ADMIN — PROPOFOL 20 MG: 10 INJECTION, EMULSION INTRAVENOUS at 16:30

## 2024-04-02 RX ADMIN — FENTANYL CITRATE 25 MCG: 50 INJECTION INTRAMUSCULAR; INTRAVENOUS at 17:16

## 2024-04-02 RX ADMIN — FENTANYL CITRATE 25 MCG: 50 INJECTION INTRAMUSCULAR; INTRAVENOUS at 17:21

## 2024-04-02 RX ADMIN — ONDANSETRON 4 MG: 2 INJECTION INTRAMUSCULAR; INTRAVENOUS at 16:45

## 2024-04-02 RX ADMIN — PROPOFOL 30 MG: 10 INJECTION, EMULSION INTRAVENOUS at 16:25

## 2024-04-02 RX ADMIN — LISINOPRIL 10 MG: 10 TABLET ORAL at 07:58

## 2024-04-02 RX ADMIN — FENTANYL CITRATE 25 MCG: 50 INJECTION INTRAMUSCULAR; INTRAVENOUS at 17:26

## 2024-04-02 RX ADMIN — KETAMINE HYDROCHLORIDE 20 MG: 50 INJECTION INTRAMUSCULAR; INTRAVENOUS at 16:25

## 2024-04-02 RX ADMIN — HEPARIN SODIUM 5000 UNITS: 5000 INJECTION INTRAVENOUS; SUBCUTANEOUS at 22:21

## 2024-04-02 RX ADMIN — AMLODIPINE BESYLATE 5 MG: 5 TABLET ORAL at 07:58

## 2024-04-02 RX ADMIN — FENTANYL CITRATE 25 MCG: 50 INJECTION INTRAMUSCULAR; INTRAVENOUS at 17:31

## 2024-04-02 RX ADMIN — ACETAMINOPHEN 325MG 650 MG: 325 TABLET ORAL at 22:22

## 2024-04-02 RX ADMIN — LIDOCAINE HYDROCHLORIDE 100 MG: 20 INJECTION, SOLUTION EPIDURAL; INFILTRATION; INTRACAUDAL at 16:25

## 2024-04-02 RX ADMIN — CEFAZOLIN SODIUM 2000 MG: 2 SOLUTION INTRAVENOUS at 16:29

## 2024-04-02 RX ADMIN — KETAMINE HYDROCHLORIDE 10 MG: 50 INJECTION INTRAMUSCULAR; INTRAVENOUS at 16:33

## 2024-04-02 RX ADMIN — FENTANYL CITRATE 25 MCG: 50 INJECTION INTRAMUSCULAR; INTRAVENOUS at 16:36

## 2024-04-02 NOTE — ANESTHESIA POSTPROCEDURE EVALUATION
"Post-Op Assessment Note    CV Status:  Stable    Pain management: adequate       Mental Status:  Alert and awake   Hydration Status:  Euvolemic   PONV Controlled:  Controlled   Airway Patency:  Patent     Post Op Vitals Reviewed: Yes    No anethesia notable event occurred.    Staff: Anesthesiologist               BP      Temp      Pulse     Resp      SpO2      /78   Pulse 74   Temp 99 °F (37.2 °C)   Resp 18   Ht 5' 1\" (1.549 m)   Wt 113 kg (249 lb 1.9 oz)   SpO2 98%   BMI 47.07 kg/m²     "

## 2024-04-02 NOTE — ASSESSMENT & PLAN NOTE
Vascular surgery if evaluating her.  She may need intervention on right lower extremity arterial disease

## 2024-04-02 NOTE — PROGRESS NOTES
"Podiatry - Progress Note  Patient: Georgie King 59 y.o. female   MRN: [id number]  PCP: Bassem Barton DO  Unit/Bed#: [address] 223-01 Encounter: [id number]  Date Of Visit: 24    ASSESSMENT:    Georgie King is a 59 y.o. female with:    Gangrene of Right great toe.   Cellulitis Right great toe  PAD  - S/p agram 24  History of tobacco use.      PLAN:    Plan for right hallux amputation today 2024 with Dr. Veliz given gangrene  Ancef 2g on call to OR for surgical prophylaxis   NPO status confirmed   Hold anticoagulant while NPO  Cleared for intervention per vascular team   Consent signed  Elevation and offloading on green foam wedges or pillows when non-ambulatory.  Appreciate consulting services for recommendations and management.     Antibiotics started: Ancef (on call to OR)  Pharmacologic VTE Prophylaxis: Sequential compression device (Venodyne)    Mechanical VTE Prophylaxis: sequential compression device   Weightbearing status: Weightbearing as tolerated    Disposition:  Patient requires >2 midnight stay for surgical intervention and work-up.     SUBJECTIVE:     The patient was seen, evaluated, and assessed at bedside today. The patient was awake, alert, and in no acute distress. No acute events overnight. The patient reports understanding of the surgical plan. Patient denies N/V/F/chills/SOB/CP.      OBJECTIVE:     Vitals:   /65   Pulse 79   Temp 97.5 °F (36.4 °C)   Resp 16   Ht 5' 1\" (1.549 m)   Wt 113 kg (249 lb 1.9 oz)   SpO2 99%   BMI 47.07 kg/m²     Temp (24hrs), Av.8 °F (36.6 °C), Min:97.5 °F (36.4 °C), Max:98 °F (36.7 °C)      Physical Exam:     Lungs: Non labored breathing  Abdomen: Soft, non-tender.  Lower Extremity:  Cardiovascular status at baseline from admission.  Neurological status at baseline from admission.  Musculoskeletal status at baseline from admission. No calf tenderness noted.   Dressings left intact.     Additional Data:     Labs:    Results from last 7 " "days   Lab Units 04/01/24  1133   WBC Thousand/uL 11.57*   HEMOGLOBIN g/dL 10.9*   HEMATOCRIT % 35.2   PLATELETS Thousands/uL 432*   NEUTROS PCT % 64   LYMPHS PCT % 29   MONOS PCT % 5   EOS PCT % 1     Results from last 7 days   Lab Units 04/01/24  1133   POTASSIUM mmol/L 3.8   CHLORIDE mmol/L 108   CO2 mmol/L 22   BUN mg/dL 14   CREATININE mg/dL 0.76   CALCIUM mg/dL 9.4   ALK PHOS U/L 89   ALT U/L 11   AST U/L 9*     Results from last 7 days   Lab Units 04/01/24  1133   INR  1.63*       * I Have Reviewed All Lab Data Listed Above.    Recent Cultures (last 7 days):     Results from last 7 days   Lab Units 04/01/24  1142 04/01/24  1133   BLOOD CULTURE  Received in Microbiology Lab. Culture in Progress. Received in Microbiology Lab. Culture in Progress.           Imaging: I have personally reviewed pertinent films in PACS  EKG, Pathology, and Other Studies: I have personally reviewed pertinent reports.    ** Please Note: Portions of the record may have been created with voice recognition software. Occasional wrong word or \"sound a like\" substitutions may have occurred due to the inherent limitations of voice recognition software. Read the chart carefully and recognize, using context, where substitutions have occurred. **      "

## 2024-04-02 NOTE — UTILIZATION REVIEW
Initial Clinical Review    Admission: Date/Time/Statement:   Admission Orders (From admission, onward)       Ordered        04/01/24 1148  INPATIENT ADMISSION  Once                          Orders Placed This Encounter   Procedures    INPATIENT ADMISSION     Standing Status:   Standing     Number of Occurrences:   1     Order Specific Question:   Level of Care     Answer:   Med Surg [16]     Order Specific Question:   Estimated length of stay     Answer:   More than 2 Midnights     Order Specific Question:   Certification     Answer:   I certify that inpatient services are medically necessary for this patient for a duration of greater than two midnights. See H&P and MD Progress Notes for additional information about the patient's course of treatment.     ED Arrival Information       Expected   -    Arrival   4/1/2024 10:41    Acuity   Urgent              Means of arrival   Walk-In    Escorted by   Self    Service   Podiatry    Admission type   Emergency              Arrival complaint   pus leaking from foot             Chief Complaint   Patient presents with    Toe Pain     Wound to R great toe, pus this morning. No fevers/chills.        Initial Presentation: 59 y.o. female presents to the ED from home with c/o progressive gangrene to R great toe over last 2 months with now purulent drainage from toe, + TTT and redness around big toe.  Surgery had been planned for 4/26.  PMH: PAD, tobacco use.  In the ED pt was tachycardic, HTN.  Labs - mild leukocytosis.  Imaging - interval changes in soft tissues R great toe, underlying bone intact.  Treated with IV antibiotics, IV fluids.  On exam DP/PT pulses are dopplerable, no edema, Clear demarcation of the right great toe gangrene just distal to the first metatarsal phalangeal joint. Surrounding skin is slightly erythematous with mild increase in temperature. No active purulence noted at bedside. Global sensation diminished. Absent protective sensation.  Admitted to  INPATIENT status with PAD - R hallux amputation on 4/2, Vascular culture for d/o healing potential.      4/1 Vascular Surgery Consult - PAD -  LEADS, dressing changes at bedside, continue Plavix, Xarelto, Lipitor.          Date: 4/2   Day 2:   PAD, gangrene and cellulitis R toe - OR today, NPO, hold AC, cleared from [facility] side. NPO.    +++++++++++++++++  4/2 OPERATIVE NOTE    Pre-op Diagnosis:  Gangrene of toe of right foot ([facility]) [I96]  PAD (peripheral artery disease) ([facility]) [I73.9]     Post-Op Diagnosis Codes:     * Gangrene of toe of right foot ([facility]) [I96]     * PAD (peripheral artery disease) ([facility]) [I73.9]     Procedure(s) (LRB):  AMPUTATION great TOE (Right)    Anesthesia Type:   IV Sedation, with 10 ml of 0.5% Bupivacaine and 1% Lidocaine in a 1:1 mixture     Operative Findings:  - Dry gangrene to hallux, disarticulated at the MTPJ level, no purulent drainage, sinus tracts, or deep abscess encountered. Adequate bleeding intra-operatively.   ++++++++++++++++    4/2 Medicine Consult - Gangrene R great toe, preop CV exam - for OR, no symptoms CP, SOB. May proceed to OR.     ED Triage Vitals [04/01/24 1048]   Temperature Pulse Respirations Blood Pressure SpO2   98 °F (36.7 °C) (!) 120 22 (!) 184/103 98 %      Temp Source Heart Rate Source Patient Position - Orthostatic VS BP Location FiO2 (%)   Oral Monitor Sitting Right arm --      Pain Score       No Pain          Wt Readings from Last 1 Encounters:   04/01/24 113 kg (249 lb 1.9 oz)     Additional Vital Signs:   Date/Time Temp Pulse Resp BP MAP (mmHg) SpO2 O2 Device Patient Position - Orthostatic VS   04/02/24 07:25:32 97.5 °F (36.4 °C) 79 16 128/65 86 99 % -- --   04/02/24 0720 -- -- -- -- -- -- None (Room air) --   04/01/24 21:08:54 98 °F (36.7 °C) 74 -- 123/62 82 98 % -- --   04/01/24 2100 -- -- -- -- -- -- None (Room air) --   04/01/24 15:50:25 97.7 °F (36.5 °C) 87 17 150/68 95 96 % -- --   04/01/24 13:35:40 97.7 °F (36.5 °C) 87 18 121/76 91 99 % -- --    04/01/24 1314 -- -- -- -- -- -- None (Room air) --   04/01/24 1200 -- 92 18 144/74 103 100 % -- --       Pertinent Labs/Diagnostic Test Results:     4/1 ECG  Normal sinus rhythm     R great toe Xray   Final Result by Joshua Adler MD (04/01 2209)   Noticeable interval change in the appearance of the soft tissues of the great toe which now appear either atrophied or debrided.     4/1 Arterial Duplex BLE -   RIGHT LOWER LIMB  There is a patent proximal superficial femoral arterial stent.  Evidence of diffuse arterial occlusive disease throughout the remaining femoropopliteal segments without focal stenosis.  Evidence of tibioperoneal disease with a >75% stenosis in the proximal and  50-75% stenosis in the distal segments of the the posterior tibial artery.  Ankle/Brachial index: 0.72 within the moderate disease category.  Prior: 1.39.  Metatarsal Pressure: 63 mmHg  Prior unable to obtain- severely attenuated.  PVR/ PPG tracings are  dampened  Prior: severely attenuated/ flat lined PPG tracings of the 2nd, 28 mmHg which is below the healing range. ( great toe  not obtained secondary to gangrene).  Prior: THe 2nd, 3rd, 4th and 5th digits were flat lined.     LEFT LOWER LIMB:  Ankle/Brachial index: 0.97  which is in the normal category. Prior:  1.32.  Metatarsal pressure of 127 mmHg   Prior:  220 mmHg.  Great toe pressure of 91  mmHg, within the healing range.  Prior: 110 mmHg.  PVR/ PPG tracings are dampened.   Tech Note:  There are multiple echogenic structures located in the right inguinal region.  One of the structures measure approximately 1.69 cm suggestive of enlarged lymphatic channels.      Results from last 7 days   Lab Units 04/01/24  1133   WBC Thousand/uL 11.57*   HEMOGLOBIN g/dL 10.9*   HEMATOCRIT % 35.2   PLATELETS Thousands/uL 432*   NEUTROS ABS Thousands/µL 7.39         Results from last 7 days   Lab Units 04/01/24  1133   SODIUM mmol/L 140   POTASSIUM mmol/L 3.8   CHLORIDE mmol/L 108   CO2  mmol/L 22   ANION GAP mmol/L 10   BUN mg/dL 14   CREATININE mg/dL 0.76   EGFR ml/min/1.73sq m 85   CALCIUM mg/dL 9.4     Results from last 7 days   Lab Units 04/01/24  1133   AST U/L 9*   ALT U/L 11   ALK PHOS U/L 89   TOTAL PROTEIN g/dL 7.8   ALBUMIN g/dL 4.1   TOTAL BILIRUBIN mg/dL 0.25     Results from last 7 days   Lab Units 04/02/24  0722 04/01/24  2125 04/01/24  1600   POC GLUCOSE mg/dl 122 114 174*     Results from last 7 days   Lab Units 04/01/24  1133   GLUCOSE RANDOM mg/dL 116     Results from last 7 days   Lab Units 04/01/24  1133   PROTIME seconds 19.5*   INR  1.63*   PTT seconds 55*         Results from last 7 days   Lab Units 04/01/24  1133   PROCALCITONIN ng/ml <0.05     Results from last 7 days   Lab Units 04/01/24  1133   LACTIC ACID mmol/L 1.5     Results from last 7 days   Lab Units 04/01/24  1142 04/01/24  1133   BLOOD CULTURE  Received in Microbiology Lab. Culture in Progress. Received in Microbiology Lab. Culture in Progress.         ED Treatment:   Medication Administration from 04/01/2024 1041 to 04/01/2024 1332         Date/Time Order Dose Route Action     04/01/2024 1155 EDT cefTRIAXone (ROCEPHIN) IVPB (premix in dextrose) 2,000 mg 50 mL 2,000 mg Intravenous New Bag     04/01/2024 1231 EDT vancomycin (VANCOCIN) 1,750 mg in sodium chloride 0.9 % 500 mL IVPB 1,750 mg Intravenous New Bag     04/01/2024 1156 EDT multi-electrolyte (ISOLYTE-S PH 7.4) bolus 1,000 mL 1,000 mL Intravenous New Bag          Past Medical History:   Diagnosis Date    Acute ischemia of Right great toe 02/21/2024    Dyslipidemia 02/21/2024    PAD (peripheral artery disease) ([facility]) 02/19/2024    Tobacco abuse 02/19/2024    Type 2 diabetes mellitus, without long-term current use of insulin ([facility]) 02/19/2024     Present on Admission:   PAD (peripheral artery disease) ([facility])   Gangrene of toe ([facility])      Admitting Diagnosis: Toe pain [M79.676]  PAD (peripheral artery disease) (HCC) [I73.9]  Cellulitis of great toe, right  [L03.031]  Gangrene of toe of right foot (HCC) [I96]  Age/Sex: 59 y.o. female  Admission Orders:  Scheduled Medications:  amLODIPine, 5 mg, Oral, Daily  atorvastatin, 40 mg, Oral, Daily With Dinner  cefazolin, 2,000 mg, Intravenous, On Call To OR  clopidogrel, 75 mg, Oral, Daily  glimepiride, 2 mg, Oral, Daily With Breakfast  heparin (porcine), 5,000 Units, Subcutaneous, Q8H OMID  insulin lispro, 1-6 Units, Subcutaneous, TID AC  lisinopril, 10 mg, Oral, Daily      Continuous IV Infusions:  sodium chloride, 50 mL/hr, Intravenous, Continuous      PRN Meds:     IV antibiotics  POC GLUCOSE AC/HS WITH [employer] COVERAGE   4/2 NPO for OR   IP CONSULT TO INTERNAL MEDICINE  IP CONSULT TO VASCULAR SURGERY    Network Utilization Review Department  ATTENTION: Please call with any questions or concerns to [phone] and carefully listen to the prompts so that you are directed to the right person. All voicemails are confidential.   For Discharge needs, contact Care Management DC Support Team at [phone] opt. 2  Send all requests for admission clinical reviews, approved or denied determinations and any other requests to dedicated fax number below belonging to the campus where the patient is receiving treatment. List of dedicated fax numbers for the Facilities:  FACILITY NAME UR FAX NUMBER   ADMISSION DENIALS (Administrative/Medical Necessity) [phone]   DISCHARGE SUPPORT TEAM (NETWORK) [phone]   PARENT CHILD HEALTH (Maternity/NICU/Pediatrics) [phone]   [facility] [phone]   [facility] [phone]   [facility] [phone]   [facility] [phone]   [facility] [phone]   [facility] [phone]   [facility] [phone]   [facility] [phone]   [facility]  [phone]   [facility] [phone]   [facility] [phone]   [facility] [phone]

## 2024-04-02 NOTE — PLAN OF CARE
Problem: SAFETY ADULT  Goal: Maintain or return to baseline ADL function  Description: INTERVENTIONS:  - Educate patient/family on patient safety including physical limitations  - Instruct patient to call for assistance with activity   - Consult OT/PT to assist with strengthening/mobility   - Keep Call bell within reach  - Keep bed low and locked with side rails adjusted as appropriate  - Keep care items and personal belongings within reach  - Initiate and maintain comfort rounds  - Make Fall Risk Sign visible to staff  - Offer Toileting every 2 Hours, in advance of need  - Initiate/Maintain bed alarm  - Obtain necessary fall risk management equipment: bed alarm  - Apply yellow socks and bracelet for high fall risk patients  - Consider moving patient to room near nurses station  4/2/2024 0221 by Mark Boland RN  Outcome: Progressing  4/2/2024 0217 by Mark Boland RN  Outcome: Progressing     Problem: DISCHARGE PLANNING  Goal: Discharge to home or other facility with appropriate resources  Description: INTERVENTIONS:  - Identify barriers to discharge w/patient and caregiver  - Arrange for needed discharge resources and transportation as appropriate  - Identify discharge learning needs (meds, wound care, etc.)  - Arrange for interpretive services to assist at discharge as needed  - Refer to Case Management Department for coordinating discharge planning if the patient needs post-hospital services based on physician/advanced practitioner order or complex needs related to functional status, cognitive ability, or social support system  4/2/2024 0221 by Mark Boland RN  Outcome: Progressing  4/2/2024 0217 by Mark Boland RN  Outcome: Progressing     Problem: Knowledge Deficit  Goal: Patient/family/caregiver demonstrates understanding of disease process, treatment plan, medications, and discharge instructions  Description: Complete learning assessment and assess knowledge base.  Interventions:  - Provide teaching at  level of understanding  - Provide teaching via preferred learning methods  4/2/2024 0221 by Mark Boland RN  Outcome: Progressing  4/2/2024 0217 by Mark Boland RN  Outcome: Progressing     Problem: SKIN/TISSUE INTEGRITY - ADULT  Goal: Skin Integrity remains intact(Skin Breakdown Prevention)  Description: Assess:  -Perform Marty assessment every shift  -Clean and moisturize skin every shift  -Inspect skin when repositioning, toileting, and assisting with ADLS  -Assess under medical devices such as masimo every shift  -Assess extremities for adequate circulation and sensation     Bed Management:  -Have minimal linens on bed & keep smooth, unwrinkled  -Change linens as needed when moist or perspiring  -Avoid sitting or lying in one position for more than 2 hours while in bed  -Keep HOB at 30degrees     Toileting:  -Offer bedside commode  -Assess for incontinence every shift  -Use incontinent care products after each incontinent episode such as foam    Activity:  -Mobilize patient 3 times a day  -Encourage activity and walks on unit  -Encourage or provide ROM exercises   -Turn and reposition patient every 2 Hours  -Use appropriate equipment to lift or move patient in bed  -Instruct/ Assist with weight shifting every 2 when out of bed in chair  -Consider limitation of chair time 2 hour intervals    Skin Care:  -Avoid use of baby powder, tape, friction and shearing, hot water or constrictive clothing  -Relieve pressure over bony prominences using waffle cushion  -Do not massage red bony areas    Next Steps:  -Teach patient strategies to minimize risks such as skin breakdown   -Consider consults to  interdisciplinary teams such as nutrition  4/2/2024 0221 by Mark Boland RN  Outcome: Progressing  4/2/2024 0217 by Mark Boland RN  Outcome: Progressing  Goal: Incision(s), wounds(s) or drain site(s) healing without S/S of infection  Description: INTERVENTIONS  - Assess and document dressing, incision, wound bed, drain  sites and surrounding tissue  - Provide patient and family education  - Perform skin care/dressing changes every day  4/2/2024 0221 by Mark Boland RN  Outcome: Progressing  4/2/2024 0217 by Mark Boland RN  Outcome: Progressing  Goal: Pressure injury heals and does not worsen  Description: Interventions:  - Implement low air loss mattress or specialty surface (Criteria met)  - Apply silicone foam dressing  - Instruct/assist with weight shifting every 120 minutes when in chair   - Limit chair time to 2 hour intervals  - Use special pressure reducing interventions such as wedges when in chair   - Apply fecal or urinary incontinence containment device   - Perform passive or active ROM every day  - Turn and reposition patient & offload bony prominences every 2 hours   - Utilize friction reducing device or surface for transfers   - Consider consults to  interdisciplinary teams such as nutrition  - Use incontinent care products after each incontinent episode such as foam  - Consider nutrition services referral as needed  4/2/2024 0221 by Makr Boland RN  Outcome: Progressing  4/2/2024 0217 by Mark Boland RN  Outcome: Progressing

## 2024-04-02 NOTE — PROGRESS NOTES
[facility]  Progress Note  Name: Georgie King I  MRN: [id number]  Unit/Bed#: [place] [address] -01 I Date of Admission: 4/1/2024   Date of Service: 4/2/2024 I Hospital Day: 1    Assessment/Plan   PAD (peripheral artery disease) (HCC)  Assessment & Plan  58 yo female ex-smoker w/ hx of HTN, dyslipidemia, obesity, DM II and severe PAD presented to [facility] on 4/1/24 w/ purulent drainage from base of R great toe. Pt has a hx of R great toe gangrene progressing over the past 2 months. Pt was hospitalized in 2/2024 w/ acute R toe pain and discoloration concerning for atheroembolic phenomenon. After vascular intervention, R great toe was allowed time to demarcate. Pt admitted by podiatry for gangrene and cellulitis of R great toe and started on IV ABX. Plan for R hallux amputation; tentatively scheduled for 4/2. SLIM consult pending.    Vascular surgery consulted for R great toe pain. Pt was hospitalized in 2/2024 w/ acute R toe pain and discoloration concerning for atheroembolic phenomenon for which she is S/P EVAR, RLE pop/TPT thrombectomy, R SFA stent and PT/AT angioplasty in OR on 2/21. LEAD shows >75% stenosis in prox PT w/ 50-75% stenosis in distal PT.     Diagnostics:  -4/1/24 LEAD: Right: Patent proximal SFA stent. Evidence of diffuse arterial occlusive disease throughout the remaining femoropopliteal segments without focal stenosis. Evidence of tibioperoneal disease with a >75% stenosis in the proximal and 50-75% stenosis in the distal segments of the PT artery. R EVELIA: 0.72/63/2nd toe 28. Left: L EVELIA: 0.97/127/91  -4/1/24 Xray R great toe: Noticeable interval change in the appearance of the soft tissues of the great toe which now appear either atrophied or debrided. The underlying bone appears intact.    Plan:  -R great toe gangrene  -Underlying PAOD w/ atheroembolic phenomenon S/P recent revascularization on 2/21  -Podiatric plan for R hallux amputation; tentatively scheduled for today  "(4/2)  -LEAD shows >75% stenosis in prox PT w/ 50-75% stenosis in distal PT  -Okay for podiatry to proceed w/ amputation and monitor intra-op bleeding and healing  -If intra-op bleeding and/or healing is suboptimal, will consider angiogram  -Continue plavix and lipitor for medical management of PAD  -Xarelto for PAD on hold secondary to planned podiatric surgical intervention  -SLIM following for medical management; input appreciated  -Will discuss w/ Dr. Joy      Subjective:  Pt seen for exam while sitting upright in bed; NAD. Pt denies any complaints at this time.     Vitals:  /65   Pulse 79   Temp 97.5 °F (36.4 °C)   Resp 16   Ht 5' 1\" (1.549 m)   Wt 113 kg (249 lb 1.9 oz)   SpO2 99%   BMI 47.07 kg/m²     I/Os:  I/O last 3 completed shifts:  In: 50 [IV Piggyback:50]  Out: -   No intake/output data recorded.    Lab Results and Cultures:   Lab Results   Component Value Date    WBC 11.57 (H) 04/01/2024    HGB 10.9 (L) 04/01/2024    HCT 35.2 04/01/2024    MCV 85 04/01/2024     (H) 04/01/2024     Lab Results   Component Value Date    GLUCOSE 130 02/21/2024    CALCIUM 9.4 04/01/2024     03/16/2015    K 3.8 04/01/2024    CO2 22 04/01/2024     04/01/2024    BUN 14 04/01/2024    CREATININE 0.76 04/01/2024     Lab Results   Component Value Date    INR 1.63 (H) 04/01/2024    INR 1.24 (H) 02/22/2024    INR 1.13 02/21/2024    PROTIME 19.5 (H) 04/01/2024    PROTIME 15.8 (H) 02/22/2024    PROTIME 14.7 (H) 02/21/2024      Blood Culture:   Lab Results   Component Value Date    BLOODCX Received in Microbiology Lab. Culture in Progress. 04/01/2024       Medications:  Current Facility-Administered Medications   Medication Dose Route Frequency    amLODIPine (NORVASC) tablet 5 mg  5 mg Oral Daily    atorvastatin (LIPITOR) tablet 40 mg  40 mg Oral Daily With Dinner    ceFAZolin (ANCEF) IVPB (premix in dextrose) 2,000 mg 50 mL  2,000 mg Intravenous On Call To OR    clopidogrel (PLAVIX) tablet 75 mg  75 " mg Oral Daily    glimepiride (AMARYL) tablet 2 mg  2 mg Oral Daily With Breakfast    heparin (porcine) subcutaneous injection 5,000 Units  5,000 Units Subcutaneous Q8H OMID    insulin lispro (HumALOG/ADMELOG) 100 units/mL subcutaneous injection 1-6 Units  1-6 Units Subcutaneous TID AC    lisinopril (ZESTRIL) tablet 10 mg  10 mg Oral Daily    sodium chloride 0.9 % infusion  50 mL/hr Intravenous Continuous       Imaging:  See above    Physical Exam:    General appearance: alert and oriented, in no acute distress  Skin:  skin color, texture, turgor normal; R great toe gangrene  Neurologic: Grossly normal  Head: Normocephalic, without obvious abnormality, atraumatic  Lungs: clear to auscultation bilaterally  Heart: regular rate and rhythm, S1, S2 normal, no murmur, click, rub or gallop  Abdomen:  obese, soft, non-tender  Extremities:  extremities normal, warm and well-perfused, (+) motor/sensation, R great toe gangrene    Wound/Incision:  R great toe gangrene w/ dressing clean, dry and intact.    Pulse exam:  DP: Right: doppler signal Left: 2+  PT: Right: doppler signal Left: 2+      KARY Olivo  4/2/2024

## 2024-04-02 NOTE — ASSESSMENT & PLAN NOTE
Patient has no worrisome symptoms of chest pain or shortness of breath.  She states every day she walks up a flight of stairs to get to her bedroom and has no chest pain or shortness of breath.  She does not need to stop while walking up the stairs.    She can perform greater than 4 METS without chest pain or shortness of breath    She does not need any further cardiac workup prior to surgery  She may proceed to surgery.  Risk of surgery is due to anesthesia and the procedure.  Patient is accepting of these risks and wishes to proceed      If possible, spinal anesthesia would lessen the risk over general.  But this may not be possible.  This was explained to the patient and she was willing to do either type of anesthesia and accepting of the risks.

## 2024-04-02 NOTE — PLAN OF CARE
Problem: SKIN/TISSUE INTEGRITY - ADULT  Goal: Skin Integrity remains intact(Skin Breakdown Prevention)  Description: Assess:  -Perform Marty assessment every shift  -Clean and moisturize skin every shift  -Inspect skin when repositioning, toileting, and assisting with ADLS  -Assess under medical devices such as masimo every shift  -Assess extremities for adequate circulation and sensation     Bed Management:  -Have minimal linens on bed & keep smooth, unwrinkled  -Change linens as needed when moist or perspiring  -Avoid sitting or lying in one position for more than 2 hours while in bed  -Keep HOB at 30degrees     Toileting:  -Offer bedside commode  -Assess for incontinence every shift  -Use incontinent care products after each incontinent episode such as foam    Activity:  -Mobilize patient 3 times a day  -Encourage activity and walks on unit  -Encourage or provide ROM exercises   -Turn and reposition patient every 2 Hours  -Use appropriate equipment to lift or move patient in bed  -Instruct/ Assist with weight shifting every 2 when out of bed in chair  -Consider limitation of chair time 2 hour intervals    Skin Care:  -Avoid use of baby powder, tape, friction and shearing, hot water or constrictive clothing  -Relieve pressure over bony prominences using waffle cushion  -Do not massage red bony areas    Next Steps:  -Teach patient strategies to minimize risks such as skin breakdown   -Consider consults to  interdisciplinary teams such as nutrition  Outcome: Progressing  Goal: Incision(s), wounds(s) or drain site(s) healing without S/S of infection  Description: INTERVENTIONS  - Assess and document dressing, incision, wound bed, drain sites and surrounding tissue  - Provide patient and family education  - Perform skin care/dressing changes every day  Outcome: Progressing  Goal: Pressure injury heals and does not worsen  Description: Interventions:  - Implement low air loss mattress or specialty surface (Criteria  met)  - Apply silicone foam dressing  - Instruct/assist with weight shifting every 120 minutes when in chair   - Limit chair time to 2 hour intervals  - Use special pressure reducing interventions such as wedges when in chair   - Apply fecal or urinary incontinence containment device   - Perform passive or active ROM every day  - Turn and reposition patient & offload bony prominences every 2 hours   - Utilize friction reducing device or surface for transfers   - Consider consults to  interdisciplinary teams such as nutrition  - Use incontinent care products after each incontinent episode such as foam  - Consider nutrition services referral as needed  Outcome: Progressing

## 2024-04-02 NOTE — ASSESSMENT & PLAN NOTE
58 yo female ex-smoker w/ hx of HTN, dyslipidemia, obesity, DM II and severe PAD presented to [facility] on 4/1/24 w/ purulent drainage from base of R great toe. Pt has a hx of R great toe gangrene progressing over the past 2 months. Pt was hospitalized in 2/2024 w/ acute R toe pain and discoloration concerning for atheroembolic phenomenon. After vascular intervention, R great toe was allowed time to demarcate. Pt admitted by podiatry for gangrene and cellulitis of R great toe and started on IV ABX. Plan for R hallux amputation; tentatively scheduled for 4/2. SLIM consult pending.    Vascular surgery consulted for R great toe pain. Pt was hospitalized in 2/2024 w/ acute R toe pain and discoloration concerning for atheroembolic phenomenon for which she is S/P EVAR, RLE pop/TPT thrombectomy, R SFA stent and PT/AT angioplasty in OR on 2/21. LEAD shows >75% stenosis in prox PT w/ 50-75% stenosis in distal PT.     Diagnostics:  -4/1/24 LEAD: Right: Patent proximal SFA stent. Evidence of diffuse arterial occlusive disease throughout the remaining femoropopliteal segments without focal stenosis. Evidence of tibioperoneal disease with a >75% stenosis in the proximal and 50-75% stenosis in the distal segments of the PT artery. R EVELIA: 0.72/63/2nd toe 28. Left: L EVELIA: 0.97/127/91  -4/1/24 Xray R great toe: Noticeable interval change in the appearance of the soft tissues of the great toe which now appear either atrophied or debrided. The underlying bone appears intact.    Plan:  -R great toe gangrene  -Underlying PAOD w/ atheroembolic phenomenon S/P recent revascularization on 2/21  -Podiatric plan for R hallux amputation; tentatively scheduled for today (4/2)  -LEAD shows >75% stenosis in prox PT w/ 50-75% stenosis in distal PT  -Okay for podiatry to proceed w/ amputation and monitor intra-op bleeding and healing  -If intra-op bleeding and/or healing is suboptimal, will consider angiogram  -Continue plavix and lipitor for medical  management of PAD  -Xarelto for PAD on hold secondary to planned podiatric surgical intervention  -SLIM following for medical management; input appreciated  -Will discuss w/ Dr. Joy

## 2024-04-02 NOTE — OP NOTE
OPERATIVE REPORT - Podiatry  PATIENT NAME: Georgie King    :  1965  MRN: [id number]  Pt Location: [facility] OR ROOM 03    SURGERY DATE: 2024    Surgeons and Role:     * Beni Veliz DPM - Primary     * Benjamín Stout DPM - Assisting    Pre-op Diagnosis:  Gangrene of toe of right foot (HCC) [I96]  PAD (peripheral artery disease) (HCC) [I73.9]    Post-Op Diagnosis Codes:     * Gangrene of toe of right foot (HCC) [I96]     * PAD (peripheral artery disease) (HCC) [I73.9]    Procedure(s) (LRB):  AMPUTATION great TOE (Right)    Specimen(s):  ID Type Source Tests Collected by Time Destination   1 : Right great toe Tissue Toe, Right TISSUE EXAM Beni Veliz DPM 2024 1623        Estimated Blood Loss:   5 mL    Drains:  * No LDAs found *    Anesthesia Type:   Choice with 10 ml of 0.5% Bupivacaine and 1% Lidocaine in a 1:1 mixture    Hemostasis:  - Atraumatic technique   - Manual compression  - Electrocautery     Materials:  * No implants in log *    Operative Findings:  - Dry gangrene to hallux, disarticulated at the MTPJ level, no purulent drainage, sinus tracts, or deep abscess encountered. Adequate bleeding intra-operatively.     Complications:   None    Procedure and Technique:     Under mild sedation, the patient was brought into the operating room and placed on the operating room table in the supine position. IV sedation was achieved by anesthesia team and a universal timeout was performed where all parties are in agreement of correct patient, correct procedure and correct site. A pneumatic tourniquet was then placed over the patient's right lower extremity with ample padding. A Osullivan block was performed consisting of 10 ml of 0.5% Bupivacaine and 1% Lidocaine in a 1:1 mixture. The foot was then prepped and draped in the usual aseptic manner. Tourniquet not inflated.     Attention was then directed to the right foot where utilizing a 15-blade, a full-thickness incision was made around the  "hallux. The hallux was then carefully disarticulated at the MTPJ level, and passed off of the surgical field for pathological examination. Any tendinous structures and non-viable soft tissue remaining was then resected using a 15-blade. 1st metatarsal head appears hard, white, and intact without signs of infection present. No purulent drainage was encountered, and remaining soft tissue appears healthy and viable. Surgical site was then thoroughly irrigated using copious amount of normal sterile saline via pulse lavage. Deep closure was then obtained to cover metatarsal using Vicryl suture. Skin edges then reapproximated and closure obtained using Nylon suture in simple interrupted and horizontal mattress techniques. The foot was then cleansed and dried, and dressing applied consisting of Xeroform, 4x4 gauze, Kerlix, and $in ACE bandage.     The patient tolerated the procedure and anesthesia well without immediate complications and transferred to PACU with vital signs stable.     As with many limb salvage procedures, we contemplate the possibility of performing further stages to this procedure. Procedures may include debridements, delayed closure, plastic surgery techniques, or more proximal amputations. This procedure may be considered part of a multi-staged limb salvage treatment plan.     Dr. Veliz was present during the entire procedure and participated in all key aspects.    SIGNATURE: Benjamín Stout DPM  DATE: April 2, 2024  TIME: 5:07 PM      Portions of the record may have been created with voice recognition software. Occasional wrong word or \"sound a like\" substitutions may have occurred due to the inherent limitations of voice recognition software. Read the chart carefully and recognize, using context, where substitutions have occurred.        "

## 2024-04-02 NOTE — H&P
[facility] Podiatry - H&P  Georgie King 59 y.o. female MRN: [id number]  Unit/Bed#: ED-11 Encounter: [id number]  Admission Date: 04/01/24     ASSESSMENT:     Georgie King is a 59 y.o. female with:     Gangrene of Right great toe.   Cellulitis Right great toe  PAD  - S/p agram 2/21/24  History of tobacco use.      PLAN:     Plan for amputation of Hallux Right this admission.  F/u blood cultures and labs  SLIM consulted.   Will discuss with Vascular.   Start Abx  F/u LEAD's  Plan for OR based on surgeon availability   Will discuss this plan with my attending and update as needed.        Antibiotics started: Ancef  Pharmacologic VTE Prophylaxis: Heparin   Mechanical VTE Prophylaxis: sequential compression device   Weight Bearing Status: WBAT     Disposition:  Patient requires >2 midnight stay for surgical intervention and work-up.  Code Status: Prior        SUBJECTIVE     History of Present Illness:     Georgie King is a 59 y.o. female with pmh of PAD, Tobacco abuse,  who presents with gangrene of the right great toe progressing over the last 2 months. Patient reports purulent drainage from the toe this morning. She is tender to the touch and red around the big toe. Denies F/C/N/V  She had a previous planned surgery for 4/26/23 with [facility].       Review of Systems:     Negative except for what is in the HPI.      Past Medical and Surgical History:      Medical History        Past Medical History:   Diagnosis Date    Acute ischemia of Right great toe 02/21/2024    Dyslipidemia 02/21/2024    PAD (peripheral artery disease) ([facility]) 02/19/2024    Tobacco abuse 02/19/2024    Type 2 diabetes mellitus, without long-term current use of insulin ([facility]) 02/19/2024            Surgical History         Past Surgical History:   Procedure Laterality Date    APPENDECTOMY        IR LOWER EXTREMITY ANGIOGRAM   2/21/2024    IR LOWER EXTREMITY ANGIOGRAM   2/21/2024    THROMBECTOMY W/ EMBOLECTOMY Right 2/21/2024     Procedure:  EMBOLECTOMY/THROMBECTOMY LOWER EXTREMITY Right;  Surgeon: Linwood Hayes MD;  Location: [facility] OR;  Service: Vascular            Meds/Allergies:       Prescriptions Prior to Admission   (Not in a hospital admission)         No Known Allergies     Social History:     Social History            Marital Status: /Civil Union     Substance Use History:   Social History           Substance and Sexual Activity   Alcohol Use Not Currently     Comment: occ      Social History           Tobacco Use   Smoking Status Former    Current packs/day: 0.50    Types: Cigarettes   Smokeless Tobacco Never      Social History          Substance and Sexual Activity   Drug Use Never         Family History:     Family History   History reviewed. No pertinent family history.           OBJECTIVE:     First Vitals:   Blood Pressure: (!) 184/103 (04/01/24 1048)  Pulse: (!) 120 (04/01/24 1048)  Temperature: 98 °F (36.7 °C) (04/01/24 1048)  Temp Source: Oral (04/01/24 1048)  Respirations: 22 (04/01/24 1048)  SpO2: 98 % (04/01/24 1048)     Current Vitals:   Blood Pressure: (!) 184/103 (04/01/24 1048)  Pulse: (!) 120 (04/01/24 1048)  Temperature: 98 °F (36.7 °C) (04/01/24 1048)  Temp Source: Oral (04/01/24 1048)  Respirations: 22 (04/01/24 1048)  SpO2: 98 % (04/01/24 1048)        Physical Exam:     General Appearance: Alert, cooperative, no distress.  HEENT: Head normocephalic, atraumatic, without obvious abnormality.  Heart: Normal rate and rhythm.  Lungs: Non-labored breathing. No respiratory distress.  Abdomen: Without distension.  Psychiatric: AAOx3  Lower Extremity:  Vascular:   DP/PT pulses are dopplerable.  Good capillary refill noted to the first metatarsal.  No capillary refill noted to the great toe.  Negative for edema  Mild increase in warmth surrounding great toe with some edema 1+.     Musculoskeletal:  Mild tenderness noted to the right great toe region.  No tenderness bilateral otherwise.     Dermatological:  Clear  demarcation of the right great toe gangrene just distal to the first metatarsal phalangeal joint.  Surrounding skin is slightly erythematous with mild increase in temperature.  No active purulence noted at bedside.     Neurological:  Gross sensation is diminished. Protective sensation is absent.   Clinical Images 04/01/24:               Lab Results:   No visits with results within 1 Day(s) from this visit.   Latest known visit with results is:   No results displayed because visit has over 200 results.             Cultures:             Imaging: I have personally reviewed pertinent films in PACS  No results found.  EKG, Pathology, and Other Studies: I have personally reviewed pertinent reports.

## 2024-04-02 NOTE — ASSESSMENT & PLAN NOTE
Lab Results   Component Value Date    HGBA1C 12.3 (H) 02/19/2024       Recent Labs     04/01/24  1600 04/01/24  2125 04/02/24  0722   POCGLU 174* 114 122       Blood Sugar Average: Last 72 hrs:  (P) 136.1185752776864213    Controlled inpatient.  Needs better control as outpatient;

## 2024-04-02 NOTE — CONSULTS
[facility]  Consult  Name: Georgie King 59 y.o. female I MRN: [id number]  Unit/Bed#: [place] [address] -01 I Date of Admission: 4/1/2024   Date of Service: 4/2/2024 I Hospital Day: 1    Inpatient consult to Internal Medicine  Consult performed by: Isidro Real DO  Consult ordered by: Summer Pool DPM          Assessment/Plan   * Gangrene of toe ([facility])  Assessment & Plan  Presents with gangrene of toe.  Going for amputation of toe this afternoon.    Preop cardiovascular exam  Assessment & Plan  Patient has no worrisome symptoms of chest pain or shortness of breath.  She states every day she walks up a flight of stairs to get to her bedroom and has no chest pain or shortness of breath.  She does not need to stop while walking up the stairs.    She can perform greater than 4 METS without chest pain or shortness of breath    She does not need any further cardiac workup prior to surgery  She may proceed to surgery.  Risk of surgery is due to anesthesia and the procedure.  Patient is accepting of these risks and wishes to proceed      If possible, spinal anesthesia would lessen the risk over general.  But this may not be possible.  This was explained to the patient and she was willing to do either type of anesthesia and accepting of the risks.    Type 2 diabetes mellitus, without long-term current use of insulin ([facility])  Assessment & Plan  Lab Results   Component Value Date    HGBA1C 12.3 (H) 02/19/2024       Recent Labs     04/01/24  1600 04/01/24  2125 04/02/24  0722   POCGLU 174* 114 122       Blood Sugar Average: Last 72 hrs:  (P) 136.9528537180113129    Controlled inpatient.  Needs better control as outpatient;    PAD (peripheral artery disease) ([facility])  Assessment & Plan  Vascular surgery if evaluating her.  She may need intervention on right lower extremity arterial disease                       History of Present Illness:    Georgie King is a 59 y.o. female who is originally  admitted to the podiatry  service due to gangrenous toe. We are consulted for preoperative evaluation.  Patient has had a worsening wound of her toe and has now become gangrene.  They are planning for amputation this afternoon.  We are asked for cardiovascular examination preoperatively.  She never had any issues with her heart.  No history of heart attacks.  No congestive heart failure.  She can walk up a flight of stairs which she does daily without any chest pain or shortness of breath.  She says the pain is minimal to her toe right now.  No fever or chills.  She says she is ready for surgery..    Review of Systems:    Review of Systems   Constitutional:  Negative for chills and fever.   HENT:  Negative for ear pain and sore throat.    Eyes:  Negative for pain and visual disturbance.   Respiratory:  Negative for cough and shortness of breath.    Cardiovascular:  Negative for chest pain and palpitations.   Gastrointestinal:  Negative for abdominal pain and vomiting.   Genitourinary:  Negative for dysuria and hematuria.   Musculoskeletal:  Negative for arthralgias and back pain.        Toe wound   Skin:  Negative for color change and rash.   Neurological:  Negative for seizures and syncope.   All other systems reviewed and are negative.      Past Medical and Surgical History:     Past Medical History:   Diagnosis Date    Acute ischemia of Right great toe 02/21/2024    Dyslipidemia 02/21/2024    PAD (peripheral artery disease) ([facility]) 02/19/2024    Tobacco abuse 02/19/2024    Type 2 diabetes mellitus, without long-term current use of insulin ([facility]) 02/19/2024       Past Surgical History:   Procedure Laterality Date    APPENDECTOMY      IR LOWER EXTREMITY ANGIOGRAM  2/21/2024    IR LOWER EXTREMITY ANGIOGRAM  2/21/2024    THROMBECTOMY W/ EMBOLECTOMY Right 2/21/2024    Procedure: EMBOLECTOMY/THROMBECTOMY LOWER EXTREMITY Right;  Surgeon: Linwood Hayes MD;  Location: [facility];  Service: Vascular  "      Meds/Allergies:    all medications and allergies reviewed    Allergies: No Known Allergies    Social History:     Marital Status: /Civil Union    Substance Use History:   Social History     Substance and Sexual Activity   Alcohol Use Not Currently    Comment: occ     Social History     Tobacco Use   Smoking Status Former    Current packs/day: 0.50    Types: Cigarettes   Smokeless Tobacco Never     Social History     Substance and Sexual Activity   Drug Use Never       Family History:    non-contributory    Physical Exam:     Vitals:   Blood Pressure: 128/65 (04/02/24 0725)  Pulse: 79 (04/02/24 0725)  Temperature: 97.5 °F (36.4 °C) (04/02/24 0725)  Temp Source: Oral (04/01/24 1335)  Respirations: 16 (04/02/24 0725)  Height: 5' 1\" (154.9 cm) (04/01/24 1335)  Weight - Scale: 113 kg (249 lb 1.9 oz) (04/01/24 1335)  SpO2: 99 % (04/02/24 0725)    Physical Exam      Additional Data:     Lab Results: I have personally reviewed pertinent reports.      Results from last 7 days   Lab Units 04/01/24  1133   WBC Thousand/uL 11.57*   HEMOGLOBIN g/dL 10.9*   HEMATOCRIT % 35.2   PLATELETS Thousands/uL 432*   NEUTROS PCT % 64   LYMPHS PCT % 29   MONOS PCT % 5   EOS PCT % 1     Results from last 7 days   Lab Units 04/01/24  1133   SODIUM mmol/L 140   POTASSIUM mmol/L 3.8   CHLORIDE mmol/L 108   CO2 mmol/L 22   BUN mg/dL 14   CREATININE mg/dL 0.76   ANION GAP mmol/L 10   CALCIUM mg/dL 9.4   ALBUMIN g/dL 4.1   TOTAL BILIRUBIN mg/dL 0.25   ALK PHOS U/L 89   ALT U/L 11   AST U/L 9*   GLUCOSE RANDOM mg/dL 116     Results from last 7 days   Lab Units 04/01/24  1133   INR  1.63*         Lab Results   Component Value Date/Time    HGBA1C 12.3 (H) 02/19/2024 10:06 AM     Results from last 7 days   Lab Units 04/02/24  0722 04/01/24  2125 04/01/24  1600   POC GLUCOSE mg/dl 122 114 174*     Results from last 7 days   Lab Units 04/01/24  1133   LACTIC ACID mmol/L 1.5   PROCALCITONIN ng/ml <0.05       Imaging: I have personally " reviewed pertinent reports.      VAS ARTERIAL DUPLEX-LOWER LIMB UNILATERAL   Final Result by Joshua Adler MD (04/01 2209)      XR toe great min 2 views RIGHT   ED Interpretation by Jaye Dubose DO (04/01 1146)   Blu erosion of the distal great toe, consistent with likely osteo    Image independently interpreted by myself       Final Result by Miguel Giraldo MD (04/01 1345)      Noticeable interval change in the appearance of the soft tissues of the great toe which now appear either atrophied or debrided.      The underlying bone appears intact.            Workstation performed: [id number]               ** Please Note: This note has been constructed using a voice recognition system. **

## 2024-04-03 LAB
GLUCOSE SERPL-MCNC: 112 MG/DL (ref 65–140)
GLUCOSE SERPL-MCNC: 114 MG/DL (ref 65–140)
GLUCOSE SERPL-MCNC: 117 MG/DL (ref 65–140)
GLUCOSE SERPL-MCNC: 162 MG/DL (ref 65–140)

## 2024-04-03 PROCEDURE — 99232 SBSQ HOSP IP/OBS MODERATE 35: CPT | Performed by: NURSE PRACTITIONER

## 2024-04-03 PROCEDURE — 99232 SBSQ HOSP IP/OBS MODERATE 35: CPT | Performed by: HOSPITALIST

## 2024-04-03 PROCEDURE — 97163 PT EVAL HIGH COMPLEX 45 MIN: CPT

## 2024-04-03 PROCEDURE — 99231 SBSQ HOSP IP/OBS SF/LOW 25: CPT | Performed by: PODIATRIST

## 2024-04-03 PROCEDURE — 82948 REAGENT STRIP/BLOOD GLUCOSE: CPT

## 2024-04-03 PROCEDURE — 97167 OT EVAL HIGH COMPLEX 60 MIN: CPT

## 2024-04-03 RX ORDER — CYCLOBENZAPRINE HCL 10 MG
5 TABLET ORAL 3 TIMES DAILY
Status: DISCONTINUED | OUTPATIENT
Start: 2024-04-03 | End: 2024-04-04 | Stop reason: HOSPADM

## 2024-04-03 RX ADMIN — OXYCODONE 5 MG: 5 TABLET ORAL at 07:51

## 2024-04-03 RX ADMIN — INSULIN LISPRO 1 UNITS: 100 INJECTION, SOLUTION INTRAVENOUS; SUBCUTANEOUS at 12:33

## 2024-04-03 RX ADMIN — CYCLOBENZAPRINE HYDROCHLORIDE 5 MG: 10 TABLET, FILM COATED ORAL at 21:23

## 2024-04-03 RX ADMIN — CLOPIDOGREL BISULFATE 75 MG: 75 TABLET ORAL at 08:35

## 2024-04-03 RX ADMIN — OXYCODONE 5 MG: 5 TABLET ORAL at 18:51

## 2024-04-03 RX ADMIN — HEPARIN SODIUM 5000 UNITS: 5000 INJECTION INTRAVENOUS; SUBCUTANEOUS at 05:00

## 2024-04-03 RX ADMIN — ATORVASTATIN CALCIUM 40 MG: 40 TABLET, FILM COATED ORAL at 16:50

## 2024-04-03 RX ADMIN — HEPARIN SODIUM 5000 UNITS: 5000 INJECTION INTRAVENOUS; SUBCUTANEOUS at 13:37

## 2024-04-03 RX ADMIN — OXYCODONE 5 MG: 5 TABLET ORAL at 13:36

## 2024-04-03 RX ADMIN — HEPARIN SODIUM 5000 UNITS: 5000 INJECTION INTRAVENOUS; SUBCUTANEOUS at 21:23

## 2024-04-03 RX ADMIN — OXYCODONE 5 MG: 5 TABLET ORAL at 01:30

## 2024-04-03 RX ADMIN — AMLODIPINE BESYLATE 5 MG: 5 TABLET ORAL at 08:35

## 2024-04-03 RX ADMIN — GLIMEPIRIDE 2 MG: 2 TABLET ORAL at 08:35

## 2024-04-03 RX ADMIN — ACETAMINOPHEN 325MG 650 MG: 325 TABLET ORAL at 10:02

## 2024-04-03 RX ADMIN — LISINOPRIL 10 MG: 10 TABLET ORAL at 08:35

## 2024-04-03 RX ADMIN — CYCLOBENZAPRINE HYDROCHLORIDE 5 MG: 10 TABLET, FILM COATED ORAL at 16:50

## 2024-04-03 NOTE — PROGRESS NOTES
[facility]  Progress Note  Name: Georgie King I  MRN: [id number]  Unit/Bed#: [place] [address] -01 I Date of Admission: 4/1/2024   Date of Service: 4/3/2024 I Hospital Day: 2    Assessment/Plan   PAD (peripheral artery disease) (HCC)  Assessment & Plan  60 yo female ex-smoker w/ hx of HTN, dyslipidemia, obesity, DM II and severe PAD presented to [facility] on 4/1/24 w/ purulent drainage from base of R great toe. Pt has a hx of R great toe gangrene progressing over the past 2 months. Pt was hospitalized in 2/2024 w/ acute R toe pain and discoloration concerning for atheroembolic phenomenon. After vascular intervention, R great toe was allowed time to demarcate. Pt admitted by podiatry for gangrene and cellulitis of R great toe. Pt was started on IV ABX w/ plan for R hallux amputation. S/P R hallux amputation on 4/2 by podiatry.    Vascular surgery consulted for R great toe pain. Pt was hospitalized in 2/2024 w/ acute R toe pain and discoloration concerning for atheroembolic phenomenon for which she is S/P EVAR, RLE pop/TPT thrombectomy, R SFA stent and PT/AT angioplasty in OR on 2/21. LEAD shows >75% stenosis in prox PT w/ 50-75% stenosis in distal PT.     Diagnostics:  -4/1/24 LEAD: Right: Patent proximal SFA stent. Evidence of diffuse arterial occlusive disease throughout the remaining femoropopliteal segments without focal stenosis. Evidence of tibioperoneal disease with a >75% stenosis in the proximal and 50-75% stenosis in the distal segments of the PT artery. R EVELIA: 0.72/63/2nd toe 28. Left: L EVELIA: 0.97/127/91  -4/1/24 Xray R great toe: Noticeable interval change in the appearance of the soft tissues of the great toe which now appear either atrophied or debrided. The underlying bone appears intact.    Plan:  -R great toe gangrene  -Underlying PAOD w/ atheroembolic phenomenon S/P recent revascularization on 2/21  -S/P R hallux amputation w/ podiatry on 4/2; intra-op bleeding adequate  -LEAD  "shows >75% stenosis in prox PT w/ 50-75% stenosis in distal PT  -Continue plavix and lipitor for medical management of PAD  -Xarelto for PAD on hold secondary to planned podiatric surgical intervention  -No vascular surgical intervention at this time  -Will sign off. Please contact vascular surgery for any questions or concerns  -Will follow up in vascular surgery office in one week to eval healing of amputation site and discuss possible need for RLE angiogram as outpt  -Will discuss w/ Dr. Moreland      Subjective:  Pt seen for exam while sitting upright in bed, visiting w/ ; NAD. Pt denies any complaints at this time.    Vitals:  /78   Pulse 79   Temp 97.8 °F (36.6 °C)   Resp 19   Ht 5' 1\" (1.549 m)   Wt 113 kg (249 lb 1.9 oz)   SpO2 95%   BMI 47.07 kg/m²     I/Os:  I/O last 3 completed shifts:  In: 750 [I.V.:700; IV Piggyback:50]  Out: 1005 [Urine:1000; Blood:5]  I/O this shift:  In: -   Out: 150 [Urine:150]    Lab Results and Cultures:   Lab Results   Component Value Date    WBC 11.57 (H) 04/01/2024    HGB 10.9 (L) 04/01/2024    HCT 35.2 04/01/2024    MCV 85 04/01/2024     (H) 04/01/2024     Lab Results   Component Value Date    GLUCOSE 130 02/21/2024    CALCIUM 9.4 04/01/2024     03/16/2015    K 3.8 04/01/2024    CO2 22 04/01/2024     04/01/2024    BUN 14 04/01/2024    CREATININE 0.76 04/01/2024     Lab Results   Component Value Date    INR 1.63 (H) 04/01/2024    INR 1.24 (H) 02/22/2024    INR 1.13 02/21/2024    PROTIME 19.5 (H) 04/01/2024    PROTIME 15.8 (H) 02/22/2024    PROTIME 14.7 (H) 02/21/2024     Blood Culture:   Lab Results   Component Value Date    BLOODCX No Growth at 24 hrs. 04/01/2024       Medications:  Current Facility-Administered Medications   Medication Dose Route Frequency    acetaminophen (TYLENOL) tablet 650 mg  650 mg Oral Q4H PRN    amLODIPine (NORVASC) tablet 5 mg  5 mg Oral Daily    atorvastatin (LIPITOR) tablet 40 mg  40 mg Oral Daily With Dinner "    clopidogrel (PLAVIX) tablet 75 mg  75 mg Oral Daily    glimepiride (AMARYL) tablet 2 mg  2 mg Oral Daily With Breakfast    heparin (porcine) subcutaneous injection 5,000 Units  5,000 Units Subcutaneous Q8H OMID    insulin lispro (HumALOG/ADMELOG) 100 units/mL subcutaneous injection 1-6 Units  1-6 Units Subcutaneous TID AC    lisinopril (ZESTRIL) tablet 10 mg  10 mg Oral Daily    naloxone (NARCAN) 0.04 mg/mL syringe 0.04 mg  0.04 mg Intravenous Q1MIN PRN    oxyCODONE (ROXICODONE) split tablet 2.5 mg  2.5 mg Oral Q4H PRN    Or    oxyCODONE (ROXICODONE) IR tablet 5 mg  5 mg Oral Q4H PRN    sodium chloride 0.9 % infusion  50 mL/hr Intravenous Continuous       Imaging:  See above    Physical Exam:    General appearance: alert and oriented, in no acute distress  Skin: Skin color, texture, turgor normal. No rashes or lesions; R great toe amp  Neurologic: Grossly normal  Head: Normocephalic, without obvious abnormality, atraumatic  Lungs: clear to auscultation bilaterally  Heart: regular rate and rhythm, S1, S2 normal, no murmur, click, rub or gallop  Abdomen:  soft, non-tender  Extremities:  extremities normal, warm and well-perfused, (+) motor/sensation, R great toe amputation    Wound/Incision:  R foot surgical dressing clean, dry and intact; no strikethrough.    Pulse exam:  DP: Right: non-palpable (surgical dressing) Left: 2+  PT: Right: non-palpable (surgical dressing) Left: 2+      KARY Olivo  4/3/2024

## 2024-04-03 NOTE — PLAN OF CARE
Problem: PHYSICAL THERAPY ADULT  Goal: Performs mobility at highest level of function for planned discharge setting.  See evaluation for individualized goals.  Description: Treatment/Interventions: Functional transfer training, LE strengthening/ROM, Therapeutic exercise, Endurance training, Patient/family training, Equipment eval/education, Bed mobility, Gait training, Compensatory technique education, OT, Family (W/c mobility)  Equipment Recommended: Wheelchair       See flowsheet documentation for full assessment, interventions and recommendations.  Note: Prognosis: Good  Problem List: Decreased endurance, Impaired balance, Decreased mobility, Decreased strength, Decreased safety awareness, Decreased skin integrity, Orthopedic restrictions, Pain, Obesity  Assessment: Pt is a 59 y.o. female y/o presenting to [facility] on 4/1/2024 with purulent drainage R great toe. Primary dx: Gangrene of toe (HCC) with cellulitis. S/P R hallux amputation on 4/2 by podiatry. WBS: NWB RLE. Per RN and pt, MD with verbal orders for WBAT to R heel only. Significant pmhx per chart: PAD, T2DM, obesity, s/p EVAR w/ RLE pop/TPT thrombectomy, R SFA stent and PT/AT angioplasty (2/2024). PT consulted to assess strength/functional mobility, activity tolerance and d/c needs. Active PT orders and activity orders for Up and OOB as tolerated . PTA, pt reports functioning at indep with functional mobility for limited distances, ADLs, and  supports with IADLS. (+) . (-) Falls. Pt greeted supine in bed. Pt amenable to PT session. During PT IE, pt presenting with above (see flowsheet) outlined functional impairments including dec strength, balance, gait, and deficits that limit functional mobility and activity tolerance relative to baseline. Pt currently requires modified independent assistance for bed mobility, superivsion-minAx1 for transfers, min Ax1 to hop on LLE for limited distances with Rolling Walker. Inc R foot pain  "with dependent positioning. Pt may benefit from W/C for inc ambulatory distances. Pt currently demonstrating inc fall risk 2/2 impulsivity, impaired balance, use of ambulatory aid, WBS, decreased safety awareness, obesity, multiple co-morbidities, varying levels of pain, and acuity of medical illness.  Fall risk education provided with good understanding. HR and SpO2 WFL during session. Nsg staff most recent vital signs as follows: /78   Pulse 79   Temp 97.8 °F (36.6 °C)   Resp 19   Ht 5' 1\" (1.549 m)   Wt 113 kg (249 lb 1.9 oz)   SpO2 95%   BMI 47.07 kg/m² . Denied additional sxs throughout session. Recommend continued mobilization of pt with nsg staff as tolerated to prevent further decline in function. Pt will benefit from continued PT services to progress mobility independence necessary for return to PLOF. Based on pt presentation and impairments, pt would most appropriately benefit from d/c to home with level III (min) rehab intensity resources  and support of family/spouse  pending progress. Pt reporting no c/f for d/c to home when medically ready. Recommend trial of w/c mobility next 1-2 sessions.  Barriers to Discharge: None     Rehab Resource Intensity Level, PT: III (Minimum Resource Intensity) (with support of )    See flowsheet documentation for full assessment.        "

## 2024-04-03 NOTE — PLAN OF CARE
Problem: Potential for Falls  Goal: Patient will remain free of falls  Description: INTERVENTIONS:  - Educate patient/family on patient safety including physical limitations  - Instruct patient to call for assistance with activity   - Consult OT/PT to assist with strengthening/mobility   - Keep Call bell within reach  - Keep bed low and locked with side rails adjusted as appropriate  - Keep care items and personal belongings within reach  - Initiate and maintain comfort rounds  - Make Fall Risk Sign visible to staff  - Offer Toileting every 2 Hours, in advance of need  - Initiate/Maintain bed alarm  - Obtain necessary fall risk management equipment: bed alarm  - Apply yellow socks and bracelet for high fall risk patients  - Consider moving patient to room near nurses station  Outcome: Progressing     Problem: PAIN - ADULT  Goal: Verbalizes/displays adequate comfort level or baseline comfort level  Description: Interventions:  - Encourage patient to monitor pain and request assistance  - Assess pain using appropriate pain scale  - Administer analgesics based on type and severity of pain and evaluate response  - Implement non-pharmacological measures as appropriate and evaluate response  - Consider cultural and social influences on pain and pain management  - Notify physician/advanced practitioner if interventions unsuccessful or patient reports new pain  Outcome: Progressing     Problem: INFECTION - ADULT  Goal: Absence or prevention of progression during hospitalization  Description: INTERVENTIONS:  - Assess and monitor for signs and symptoms of infection  - Monitor lab/diagnostic results  - Monitor all insertion sites, i.e. indwelling lines, tubes, and drains  - Monitor endotracheal if appropriate and nasal secretions for changes in amount and color  - Sparks appropriate cooling/warming therapies per order  - Administer medications as ordered  - Instruct and encourage patient and family to use good hand  hygiene technique  - Identify and instruct in appropriate isolation precautions for identified infection/condition  Outcome: Progressing  Goal: Absence of fever/infection during neutropenic period  Description: INTERVENTIONS:  - Monitor WBC    Outcome: Progressing     Problem: SAFETY ADULT  Goal: Patient will remain free of falls  Description: INTERVENTIONS:  - Educate patient/family on patient safety including physical limitations  - Instruct patient to call for assistance with activity   - Consult OT/PT to assist with strengthening/mobility   - Keep Call bell within reach  - Keep bed low and locked with side rails adjusted as appropriate  - Keep care items and personal belongings within reach  - Initiate and maintain comfort rounds  - Make Fall Risk Sign visible to staff  - Offer Toileting every 2 Hours, in advance of need  - Initiate/Maintain bed alarm  - Obtain necessary fall risk management equipment: bed alarm  - Apply yellow socks and bracelet for high fall risk patients  - Consider moving patient to room near nurses station  Outcome: Progressing  Goal: Maintain or return to baseline ADL function  Description: INTERVENTIONS:  - Educate patient/family on patient safety including physical limitations  - Instruct patient to call for assistance with activity   - Consult OT/PT to assist with strengthening/mobility   - Keep Call bell within reach  - Keep bed low and locked with side rails adjusted as appropriate  - Keep care items and personal belongings within reach  - Initiate and maintain comfort rounds  - Make Fall Risk Sign visible to staff  - Offer Toileting every 2 Hours, in advance of need  - Initiate/Maintain bed alarm  - Obtain necessary fall risk management equipment: bed alarm  - Apply yellow socks and bracelet for high fall risk patients  - Consider moving patient to room near nurses station  Outcome: Progressing  Goal: Maintains/Returns to pre admission functional level  Description: INTERVENTIONS:  -  Perform AM-PAC 6 Click Basic Mobility/ Daily Activity assessment daily.  - Set and communicate daily mobility goal to care team and patient/family/caregiver.   - Collaborate with rehabilitation services on mobility goals if consulted  - Perform Range of Motion 3 times a day.  - Reposition patient every 2 hours.  - Dangle patient 3 times a day  - Stand patient 3 times a day  - Ambulate patient 3 times a day  - Out of bed to chair 3 times a day   - Out of bed for meals 3 times a day  - Out of bed for toileting  - Record patient progress and toleration of activity level   Outcome: Progressing     Problem: DISCHARGE PLANNING  Goal: Discharge to home or other facility with appropriate resources  Description: INTERVENTIONS:  - Identify barriers to discharge w/patient and caregiver  - Arrange for needed discharge resources and transportation as appropriate  - Identify discharge learning needs (meds, wound care, etc.)  - Arrange for interpretive services to assist at discharge as needed  - Refer to Case Management Department for coordinating discharge planning if the patient needs post-hospital services based on physician/advanced practitioner order or complex needs related to functional status, cognitive ability, or social support system  Outcome: Progressing     Problem: Knowledge Deficit  Goal: Patient/family/caregiver demonstrates understanding of disease process, treatment plan, medications, and discharge instructions  Description: Complete learning assessment and assess knowledge base.  Interventions:  - Provide teaching at level of understanding  - Provide teaching via preferred learning methods  Outcome: Progressing     Problem: SKIN/TISSUE INTEGRITY - ADULT  Goal: Skin Integrity remains intact(Skin Breakdown Prevention)  Description: Assess:  -Perform Marty assessment every shift  -Clean and moisturize skin every shift  -Inspect skin when repositioning, toileting, and assisting with ADLS  -Assess under medical  devices such as masimo every shift  -Assess extremities for adequate circulation and sensation     Bed Management:  -Have minimal linens on bed & keep smooth, unwrinkled  -Change linens as needed when moist or perspiring  -Avoid sitting or lying in one position for more than 2 hours while in bed  -Keep HOB at 30degrees     Toileting:  -Offer bedside commode  -Assess for incontinence every shift  -Use incontinent care products after each incontinent episode such as foam    Activity:  -Mobilize patient 3 times a day  -Encourage activity and walks on unit  -Encourage or provide ROM exercises   -Turn and reposition patient every 2 Hours  -Use appropriate equipment to lift or move patient in bed  -Instruct/ Assist with weight shifting every 2 when out of bed in chair  -Consider limitation of chair time 2 hour intervals    Skin Care:  -Avoid use of baby powder, tape, friction and shearing, hot water or constrictive clothing  -Relieve pressure over bony prominences using waffle cushion  -Do not massage red bony areas    Next Steps:  -Teach patient strategies to minimize risks such as skin breakdown   -Consider consults to  interdisciplinary teams such as nutrition  Outcome: Progressing  Goal: Incision(s), wounds(s) or drain site(s) healing without S/S of infection  Description: INTERVENTIONS  - Assess and document dressing, incision, wound bed, drain sites and surrounding tissue  - Provide patient and family education  - Perform skin care/dressing changes every day  Outcome: Progressing  Goal: Pressure injury heals and does not worsen  Description: Interventions:  - Implement low air loss mattress or specialty surface (Criteria met)  - Apply silicone foam dressing  - Instruct/assist with weight shifting every 120 minutes when in chair   - Limit chair time to 2 hour intervals  - Use special pressure reducing interventions such as wedges when in chair   - Apply fecal or urinary incontinence containment device   - Perform  passive or active ROM every day  - Turn and reposition patient & offload bony prominences every 2 hours   - Utilize friction reducing device or surface for transfers   - Consider consults to  interdisciplinary teams such as nutrition  - Use incontinent care products after each incontinent episode such as foam  - Consider nutrition services referral as needed  Outcome: Progressing

## 2024-04-03 NOTE — PHYSICAL THERAPY NOTE
PHYSICAL THERAPY EVALUATION     NAME:  Georgie King  DATE: 04/03/24    AGE:   59 y.o.  Mrn:   [id number]  ADMIT DX:  Toe pain [M79.676]  PAD (peripheral artery disease) (HCC) [I73.9]  Cellulitis of great toe, right [L03.031]  Gangrene of toe of right foot (HCC) [I96]    Patient Active Problem List   Diagnosis    PAD (peripheral artery disease) (HCC)    Tobacco abuse    Type 2 diabetes mellitus, without long-term current use of insulin (HCC)    Abnormal CT scan    Morbid obesity (HCC)    HTN (hypertension)    Acute ischemia of Right great toe    Dyslipidemia    Diabetes mellitus type 2 with peripheral artery disease (HCC)    Gangrene of toe (HCC)    Preop cardiovascular exam       Past Medical History:   Diagnosis Date    Acute ischemia of Right great toe 02/21/2024    Dyslipidemia 02/21/2024    PAD (peripheral artery disease) ([facility]) 02/19/2024    Tobacco abuse 02/19/2024    Type 2 diabetes mellitus, without long-term current use of insulin ([facility]) 02/19/2024       Past Surgical History:   Procedure Laterality Date    APPENDECTOMY      IR LOWER EXTREMITY ANGIOGRAM  2/21/2024    IR LOWER EXTREMITY ANGIOGRAM  2/21/2024    THROMBECTOMY W/ EMBOLECTOMY Right 2/21/2024    Procedure: EMBOLECTOMY/THROMBECTOMY LOWER EXTREMITY Right;  Surgeon: Linwood Hayes MD;  Location: [facility];  Service: Vascular    TOE AMPUTATION Right 4/2/2024    Procedure: AMPUTATION great TOE;  Surgeon: Beni Veliz DPM;  Location: [facility];  Service: Podiatry       Imaging Studies:  XR foot right 3+ views   Final Result by Miguel Giraldo MD (04/03 0840)      Amputation of the great toe.      Workstation performed: [id number]         VAS ARTERIAL DUPLEX-LOWER LIMB UNILATERAL   Final Result by Joshua Adler MD (04/01 2209)      XR toe great min 2 views RIGHT   ED Interpretation by Jaye Dubose DO (04/01 1146)   Blu erosion of the distal great toe, consistent with likely osteo    Image independently interpreted by  myself       Final Result by Miguel Giraldo MD (04/01 5604)      Noticeable interval change in the appearance of the soft tissues of the great toe which now appear either atrophied or debrided.      The underlying bone appears intact.            Workstation performed: [id number]             Past Medical History:   Diagnosis Date    Acute ischemia of Right great toe 02/21/2024    Dyslipidemia 02/21/2024    PAD (peripheral artery disease) ([facility]) 02/19/2024    Tobacco abuse 02/19/2024    Type 2 diabetes mellitus, without long-term current use of insulin ([facility]) 02/19/2024     Length Of Stay: 2  Performed at least 2 patient identifiers during session: Name and Birthday  PHYSICAL THERAPY EVALUATION :        04/03/24 0913   PT Last Visit   PT Visit Date 04/03/24   Note Type   Note type Evaluation   Pain Assessment   Pain Assessment Tool 0-10   Pain Score 5  (inc with RLE in dependent positioning)   Pain Location/Orientation Orientation: Right;Location: Foot   Hospital Pain Intervention(s) Repositioned;Ambulation/increased activity;Elevated;Rest   Restrictions/Precautions   Weight Bearing Precautions Per Order Yes   RLE Weight Bearing Per Order (S)  NWB  (Per RN and pt, MD with verbal orders for WBAT to R heel only.)   Other Precautions WBS;Multiple lines;Fall Risk;Pain;Impulsive  (IV)   Home Living   Type of Home House   Home Layout Two level;Able to live on main level with bedroom/bathroom;Bed/bath upstairs;Ramped entrance;Other (Comment)  (Main level with bedroom and full bathroom with walk-in shower, built in shower chair, grab bars. Ramped entrance. Additional bedrooms and full bathrooms upstairs.)   Bathroom Shower/Tub Walk-in shower   Bathroom Toilet Standard   Bathroom Equipment Built-in shower seat;Grab bars in shower   Bathroom Accessibility Accessible   Home Equipment Walker;Other (Comment)  (RW and rollator)   Additional Comments Lives with ,  able to assist however works night shift.  "  Prior Function   Level of [place] Independent with ADLs;Independent with functional mobility   Lives With Spouse   Receives Help From Family   Falls in the last 6 months 0   Vocational Other (Comment)  (Previously part time employed, hoping to return to work)   Comments PTA, pt reports functioning at indep with functional mobility for limited distances, ADLs, and  supports with IADLS. (+) . (-) Falls.   General   Additional Pertinent History 4/1/2024 with purulent drainage R great toe. Primary dx: Gangrene of toe (HCC) with cellulitis. S/P R hallux amputation on 4/2 by podiatry. WBS: NWB RLE. Per RN and pt, MD with verbal orders for WBAT to R heel only. Significant pmhx per chart: PAD, T2DM, obesity, s/p EVAR w/ RLE pop/TPT thrombectomy, R SFA stent and PT/AT angioplasty (2/2024).   Family/Caregiver Present Yes  ()   Cognition   Overall Cognitive Status WFL   Arousal/Participation Alert   Orientation Level Oriented X4   Memory Within functional limits   Following Commands Follows one step commands without difficulty   Comments Cooperative. Requiring frequent cues for safety with impuslivity noted during ambulation impacting fall risk.   Subjective   Subjective \"It feels good to be moving.\" Pt reporting she could place knee on rollator for support. Edu on fall risk and benefits of alternative AD.   RUE Assessment   RUE Assessment   (refer to OT)   LUE Assessment   LUE Assessment   (Refer to OT)   RLE Assessment   RLE Assessment X  (grossly 4/5 at knee, ankle deferred 2/2 NWB)   LLE Assessment   LLE Assessment WFL  (grossly 4/5 major muscle groups)   Coordination   Sensation WFL   Bed Mobility   Supine to Sit 6  Modified independent   Additional items HOB elevated   Sit to Supine 6  Modified independent   Additional items HOB elevated   Additional Comments HOB 45 deg. Able to maintain NWB   Transfers   Sit to Stand 4  Minimal assistance   Additional items Assist x 1;Increased time " required;Verbal cues;Armrests   Stand to Sit 5  Supervision   Additional items Increased time required;Verbal cues   Additional Comments Cued for safe technique/hand placement. RW for stability. Min Ax1 for force production/stability. Able to stand within RW supervision without gross LOB while maintain NWB.   Ambulation/Elevation   Gait pattern Antalgic  (hopping, impulsive, excessively fast)   Gait Assistance 4  Minimal assist   Additional items Assist x 1   Assistive Device Rolling walker   Distance 12'x1 with RW and hopping on LLE.   Ambulation/Elevation Additional Comments unsteady hopping initially 2/2 excessively fast pace. Improved stability with cues for small, controlled, slow hops while maintaining RW on floor. Able to maintain WBS with dec pace. No gross LOB throughout. Effortful. Standing tolerance ~5 minutes with fatigue.   Balance   Static Sitting Good   Dynamic Sitting Fair +   Static Standing Fair -  (with RW)   Dynamic Standing Poor +  (RW)   Ambulatory Poor +  (RW)   Endurance Deficit   Endurance Deficit Yes   Endurance Deficit Description fatigue, pain   Activity Tolerance   Activity Tolerance Patient tolerated treatment well;Patient limited by fatigue  (HR 80's up to 111 with activity. SpO2 >93% thoughout.)   Medical Staff Made Aware [facility]   Nurse Made Aware RN cleared/updated   Assessment   Prognosis Good   Problem List Decreased endurance;Impaired balance;Decreased mobility;Decreased strength;Decreased safety awareness;Decreased skin integrity;Orthopedic restrictions;Pain;Obesity   Assessment Pt is a 59 y.o. female y/o [facility]24 with purulent drainage R great toe. Primary dx: Gangrene of toe (HCC) with cellulitis. S/P R hallux amputation on 4/2 by podiatry. WBS: NWB RLE. Per RN and pt, MD with verbal orders for WBAT to R heel only. Significant pmhx per chart: PAD, T2DM, obesity, s/p EVAR w/ RLE pop/TPT thrombectomy, R SFA stent and PT/AT angioplasty  "(2/2024). PT consulted to assess strength/functional mobility, activity tolerance and d/c needs. Active PT orders and activity orders for Up and OOB as tolerated . PTA, pt reports functioning at indep with functional mobility for limited distances, ADLs, and  supports with IADLS. (+) . (-) Falls. Pt greeted supine in bed. Pt amenable to PT session. During PT IE, pt presenting with above (see flowsheet) outlined functional impairments including dec strength, balance, gait, and deficits that limit functional mobility and activity tolerance relative to baseline. Pt currently requires modified independent assistance for bed mobility, superivsion-minAx1 for transfers, min Ax1 to hop on LLE for limited distances with Rolling Walker. Inc R foot pain with dependent positioning. Pt may benefit from W/C for inc ambulatory distances. Pt currently demonstrating inc fall risk 2/2 impulsivity, impaired balance, use of ambulatory aid, WBS, decreased safety awareness, obesity, multiple co-morbidities, varying levels of pain, and acuity of medical illness.  Fall risk education provided with good understanding. HR and SpO2 WFL during session. Nsg staff most recent vital signs as follows: /78   Pulse 79   Temp 97.8 °F (36.6 °C)   Resp 19   Ht 5' 1\" (1.549 m)   Wt 113 kg (249 lb 1.9 oz)   SpO2 95%   BMI 47.07 kg/m² . Denied additional sxs throughout session. Recommend continued mobilization of pt with nsg staff as tolerated to prevent further decline in function. Pt will benefit from continued PT services to progress mobility independence necessary for return to PLOF. Based on pt presentation and impairments, pt would most appropriately benefit from d/c to home with level III (min) rehab intensity resources  and support of family/spouse  pending progress. Pt reporting no c/f for d/c to home when medically ready. Recommend trial of w/c mobility next 1-2 sessions.   Barriers to Discharge None   Goals   Patient " Goals Get moving   STG Expiration Date 04/17/24   Short Term Goal #1 1).  Pt will perform bed mobility with Joe with bed falt demonstrating appropriate technique 100% of the time in order to improve function.2)  Perform all transfers with Joe demonstrating safe and appropriate technique 100% of the time in order to improve ability to negotiate safely in home environment. 3) Amb with least restrictive AD > 15'x1 with mod I in order to demonstrate improved ability to negotiate in home environment for limited distances. 4)  Improve overall strength and balance 1/2 grade in order to optimize ability to perform functional tasks and reduce fall risk.5) Increase activity tolerance to 45 minutes in order to improve endurance to functional tasks. 6)  Mod I with W/c mobility to improve ability to negotiate within home  7) PT for ongoing patient and family/caregiver education, DME needs and d/c planning in order to promote highest level of function in least restrictive environment.   PT Treatment Day 0   Plan   Treatment/Interventions Functional transfer training;LE strengthening/ROM;Therapeutic exercise;Endurance training;Patient/family training;Equipment eval/education;Bed mobility;Gait training;Compensatory technique education;OT;Family  (W/c mobility)   PT Frequency 2-3x/wk   Discharge Recommendation   Rehab Resource Intensity Level, PT III (Minimum Resource Intensity)  (with support of )   Equipment Recommended Wheelchair   Wheelchair Package Recommended Heavy Duty (pt wt 250 lbs+)   Additional Comments The patient's AM-[facility] Basic Mobility Inpatient Short Form Raw Score is 19. A Raw score of greater than 16 suggests the patient may benefit from discharge to home. Please also refer to the recommendation of the Physical Therapist for safe discharge planning.   AM-PAC Basic Mobility Inpatient   Turning in Flat Bed Without Bedrails 4   Lying on Back to Sitting on Edge of Flat Bed Without Bedrails 4   Moving Bed to  Chair 3   Standing Up From Chair Using Arms 3   Walk in Room 3   Climb 3-5 Stairs With Railing 2   Basic Mobility Inpatient Raw Score 19   Basic Mobility Standardized Score 42.48   [facility] Highest Level Of Mobility   -HLM Goal 6: Walk 10 steps or more   -HLM Achieved 6: Walk 10 steps or more   End of Consult   Patient Position at End of Consult Supine;All needs within reach  (Pt returned supine in bed, educated on fall risk , Instructed to use call bell for assistance, all needs met/within reach , All PT questions/concerns addressed, pt stable, offering no complaints, and RN updated on pt performance.  in room.)   End of Consult Comments Edu: PT goals/POC, fall risk , WBS, proper use of AD, pt/family caregiver edu, safe guarding technique with mobility, and benefits of EOB/OOB mobility       Pt requires PT/OT co-eval for pt's best interest due to medical complexity, safety concerns, fall risk, dec activity tolerance which is decline from PLOF, medical acuity.    (Please find full objective findings from PT assessment regarding body systems outlined above).     Hx/personal factors: co-morbidities, mutliple lines, use of AD, pain, WB restrictions, fall risk, and (+) home alone at night , social background, past experience, post op precautions  Examination: assessed/impairments of systems including multiple body structures involved; dec mobility, dec balance, dec endurance, dec amb, risk for falls, pain, WB restrictions, impairements in locomotion, musculoskeletal, balance, endurance, posture, coordination, assessed cognition, skin integrity, activity limitations (difficulties executing an action) , participation restrictions (problems associate w/ involvement in life situations), AM-PAC score suggesting inc assistance/supervision vs baseline, impaired judgment/safety awareness/impulsivity, gait deviations , dec activity tolerance/endurance vs baseline ,   Clinical: unpredictable (ongoing medical  status, risk for falls, need for input for mobility technique/safety, WBS, and recent admission )  Complexity: high        Nithin Barbosa, PT,DPT   04/03/24

## 2024-04-03 NOTE — PLAN OF CARE
Problem: Potential for Falls  Goal: Patient will remain free of falls  Description: INTERVENTIONS:  - Educate patient/family on patient safety including physical limitations  - Instruct patient to call for assistance with activity   - Consult OT/PT to assist with strengthening/mobility   - Keep Call bell within reach  - Keep bed low and locked with side rails adjusted as appropriate  - Keep care items and personal belongings within reach  - Initiate and maintain comfort rounds  - Make Fall Risk Sign visible to staff  - Offer Toileting every 2 Hours, in advance of need  - Initiate/Maintain bed alarm  - Obtain necessary fall risk management equipment: bed alarm  - Apply yellow socks and bracelet for high fall risk patients  - Consider moving patient to room near nurses station  Outcome: Progressing     Problem: PAIN - ADULT  Goal: Verbalizes/displays adequate comfort level or baseline comfort level  Description: Interventions:  - Encourage patient to monitor pain and request assistance  - Assess pain using appropriate pain scale  - Administer analgesics based on type and severity of pain and evaluate response  - Implement non-pharmacological measures as appropriate and evaluate response  - Consider cultural and social influences on pain and pain management  - Notify physician/advanced practitioner if interventions unsuccessful or patient reports new pain  Outcome: Progressing     Problem: INFECTION - ADULT  Goal: Absence or prevention of progression during hospitalization  Description: INTERVENTIONS:  - Assess and monitor for signs and symptoms of infection  - Monitor lab/diagnostic results  - Monitor all insertion sites, i.e. indwelling lines, tubes, and drains  - Monitor endotracheal if appropriate and nasal secretions for changes in amount and color  - [place] appropriate cooling/warming therapies per order  - Administer medications as ordered  - Instruct and encourage patient and family to use good hand  hygiene technique  - Identify and instruct in appropriate isolation precautions for identified infection/condition  Outcome: Progressing  Goal: Absence of fever/infection during neutropenic period  Description: INTERVENTIONS:  - Monitor WBC    Outcome: Progressing     Problem: SAFETY ADULT  Goal: Patient will remain free of falls  Description: INTERVENTIONS:  - Educate patient/family on patient safety including physical limitations  - Instruct patient to call for assistance with activity   - Consult OT/PT to assist with strengthening/mobility   - Keep Call bell within reach  - Keep bed low and locked with side rails adjusted as appropriate  - Keep care items and personal belongings within reach  - Initiate and maintain comfort rounds  - Make Fall Risk Sign visible to staff  - Offer Toileting every 2 Hours, in advance of need  - Initiate/Maintain bed alarm  - Obtain necessary fall risk management equipment: bed alarm  - Apply yellow socks and bracelet for high fall risk patients  - Consider moving patient to room near nurses station  Outcome: Progressing  Goal: Maintain or return to baseline ADL function  Description: INTERVENTIONS:  - Educate patient/family on patient safety including physical limitations  - Instruct patient to call for assistance with activity   - Consult OT/PT to assist with strengthening/mobility   - Keep Call bell within reach  - Keep bed low and locked with side rails adjusted as appropriate  - Keep care items and personal belongings within reach  - Initiate and maintain comfort rounds  - Make Fall Risk Sign visible to staff  - Offer Toileting every 2 Hours, in advance of need  - Initiate/Maintain bed alarm  - Obtain necessary fall risk management equipment: bed alarm  - Apply yellow socks and bracelet for high fall risk patients  - Consider moving patient to room near nurses station  Outcome: Progressing  Goal: Maintains/Returns to pre admission functional level  Description: INTERVENTIONS:  -  Perform AM-PAC 6 Click Basic Mobility/ Daily Activity assessment daily.  - Set and communicate daily mobility goal to care team and patient/family/caregiver.   - Collaborate with rehabilitation services on mobility goals if consulted  - Perform Range of Motion 3 times a day.  - Reposition patient every 2 hours.  - Dangle patient 3 times a day  - Stand patient 3 times a day  - Ambulate patient 3 times a day  - Out of bed to chair 3 times a day   - Out of bed for meals 3 times a day  - Out of bed for toileting  - Record patient progress and toleration of activity level   Outcome: Progressing     Problem: DISCHARGE PLANNING  Goal: Discharge to home or other facility with appropriate resources  Description: INTERVENTIONS:  - Identify barriers to discharge w/patient and caregiver  - Arrange for needed discharge resources and transportation as appropriate  - Identify discharge learning needs (meds, wound care, etc.)  - Arrange for interpretive services to assist at discharge as needed  - Refer to Case Management Department for coordinating discharge planning if the patient needs post-hospital services based on physician/advanced practitioner order or complex needs related to functional status, cognitive ability, or social support system  Outcome: Progressing     Problem: Knowledge Deficit  Goal: Patient/family/caregiver demonstrates understanding of disease process, treatment plan, medications, and discharge instructions  Description: Complete learning assessment and assess knowledge base.  Interventions:  - Provide teaching at level of understanding  - Provide teaching via preferred learning methods  Outcome: Progressing     Problem: SKIN/TISSUE INTEGRITY - ADULT  Goal: Skin Integrity remains intact(Skin Breakdown Prevention)  Description: Assess:  -Perform Marty assessment every shift  -Clean and moisturize skin every shift  -Inspect skin when repositioning, toileting, and assisting with ADLS  -Assess under medical  devices such as masimo every shift  -Assess extremities for adequate circulation and sensation     Bed Management:  -Have minimal linens on bed & keep smooth, unwrinkled  -Change linens as needed when moist or perspiring  -Avoid sitting or lying in one position for more than 2 hours while in bed  -Keep HOB at 30degrees     Toileting:  -Offer bedside commode  -Assess for incontinence every shift  -Use incontinent care products after each incontinent episode such as foam    Activity:  -Mobilize patient 3 times a day  -Encourage activity and walks on unit  -Encourage or provide ROM exercises   -Turn and reposition patient every 2 Hours  -Use appropriate equipment to lift or move patient in bed  -Instruct/ Assist with weight shifting every 2 when out of bed in chair  -Consider limitation of chair time 2 hour intervals    Skin Care:  -Avoid use of baby powder, tape, friction and shearing, hot water or constrictive clothing  -Relieve pressure over bony prominences using waffle cushion  -Do not massage red bony areas    Next Steps:  -Teach patient strategies to minimize risks such as skin breakdown   -Consider consults to  interdisciplinary teams such as nutrition  Outcome: Progressing  Goal: Incision(s), wounds(s) or drain site(s) healing without S/S of infection  Description: INTERVENTIONS  - Assess and document dressing, incision, wound bed, drain sites and surrounding tissue  - Provide patient and family education  - Perform skin care/dressing changes every day  Outcome: Progressing  Goal: Pressure injury heals and does not worsen  Description: Interventions:  - Implement low air loss mattress or specialty surface (Criteria met)  - Apply silicone foam dressing  - Instruct/assist with weight shifting every 120 minutes when in chair   - Limit chair time to 2 hour intervals  - Use special pressure reducing interventions such as wedges when in chair   - Apply fecal or urinary incontinence containment device   - Perform  passive or active ROM every day  - Turn and reposition patient & offload bony prominences every 2 hours   - Utilize friction reducing device or surface for transfers   - Consider consults to  interdisciplinary teams such as nutrition  - Use incontinent care products after each incontinent episode such as foam  - Consider nutrition services referral as needed  Outcome: Progressing     Problem: METABOLIC, FLUID AND ELECTROLYTES - ADULT  Goal: Electrolytes maintained within normal limits  Description: INTERVENTIONS:  - Monitor labs and assess patient for signs and symptoms of electrolyte imbalances  - Administer electrolyte replacement as ordered  - Monitor response to electrolyte replacements, including repeat lab results as appropriate  - Instruct patient on fluid and nutrition as appropriate  Outcome: Progressing  Goal: Fluid balance maintained  Description: INTERVENTIONS:  - Monitor labs   - Monitor I/O and WT  - Instruct patient on fluid and nutrition as appropriate  - Assess for signs & symptoms of volume excess or deficit  Outcome: Progressing  Goal: Glucose maintained within target range  Description: INTERVENTIONS:  - Monitor Blood Glucose as ordered  - Assess for signs and symptoms of hyperglycemia and hypoglycemia  - Administer ordered medications to maintain glucose within target range  - Assess nutritional intake and initiate nutrition service referral as needed  Outcome: Progressing

## 2024-04-03 NOTE — OCCUPATIONAL THERAPY NOTE
Occupational Therapy Evaluation  TIME ERROR: 8:55-9:13     Patient Name: Georgie King  Today's Date: 4/3/2024  Problem List  Principal Problem:    Gangrene of toe (Formerly [facility])  Active Problems:    PAD (peripheral artery disease) (Formerly [facility])    Type 2 diabetes mellitus, without long-term current use of insulin (Formerly [facility])    Preop cardiovascular exam    Past Medical History  Past Medical History:   Diagnosis Date    Acute ischemia of Right great toe 02/21/2024    Dyslipidemia 02/21/2024    PAD (peripheral artery disease) (Formerly [facility]) 02/19/2024    Tobacco abuse 02/19/2024    Type 2 diabetes mellitus, without long-term current use of insulin (Formerly [facility]) 02/19/2024     Past Surgical History  Past Surgical History:   Procedure Laterality Date    APPENDECTOMY      IR LOWER EXTREMITY ANGIOGRAM  2/21/2024    IR LOWER EXTREMITY ANGIOGRAM  2/21/2024    THROMBECTOMY W/ EMBOLECTOMY Right 2/21/2024    Procedure: EMBOLECTOMY/THROMBECTOMY LOWER EXTREMITY Right;  Surgeon: Linwood Hayes MD;  [facility]: [facility];  Service: Vascular    TOE AMPUTATION Right 4/2/2024    Procedure: AMPUTATION great TOE;  Surgeon: Beni Veliz DPM;  Location: [facility];  Service: Podiatry         04/03/24 0820   OT Last Visit   OT Visit Date 04/03/24   Note Type   Note type Evaluation   Pain Assessment   Pain Assessment Tool 0-10   Pain Score 5   Pain Location/Orientation Orientation: Right;Location: Foot   Hospital Pain Intervention(s) Ambulation/increased activity;Repositioned;Emotional support;Rest   Restrictions/Precautions   Weight Bearing Precautions Per Order Yes   RLE Weight Bearing Per Order NWB  (Per RN and pt, MD with verbal orders for WBAT to R heel only.)   Other Precautions WBS;Multiple lines;Fall Risk;Pain;Impulsive   Home Living   Type of Home House   Home Layout Two level;Able to live on main level with bedroom/bathroom;Bed/bath upstairs;Ramped entrance;Other (Comment)   Bathroom Shower/Tub Walk-in shower   Bathroom Toilet Standard   Bathroom  "Equipment Built-in shower seat   Home Equipment Walker;Other (Comment)  (Rollator)   Prior Function   Level of [place] Independent with ADLs;Independent with functional mobility;Other (Comment)  (Shares IADLs w spouse)   Lives With Spouse   Receives Help From Family   IADLs Independent with driving;Independent with meal prep;Independent with medication management   Falls in the last 6 months 0   Vocational Other (Comment)  (Previously part time employed, hoping to return to work)   Lifestyle   Autonomy PTA, was (I) with ADLs, shares IADLs w spouse. Patient lives in a 2 SH w full bathroom on main floor - walk in shower w built in shower seat, standard toilet. 0 HERSON. Has RW, rollator. Spouse works nights.   Reciprocal Relationships spouse   General   Additional Pertinent History Comorbidities affecting pt’s functional performance include a significant PMH of: PAD, DM2, obesity, HTN.  Recent agram completed 2/21/24. Patient with active OT orders and activity orders for Up and OOB as tolerated.   Family/Caregiver Present Yes   Additional General Comments spouse present.   Subjective   Subjective \"I really haven't been up yet\"   ADL   Where Assessed Edge of bed   Eating Assistance 7  Independent   Grooming Assistance 6  Modified Independent   UB Bathing Assistance 5  Supervision/Setup   LB Bathing Assistance 4  Minimal Assistance   UB Dressing Assistance 5  Supervision/Setup   LB Dressing Assistance 4  Minimal Assistance   Toileting Assistance  5  Supervision/Setup   Bed Mobility   Supine to Sit 6  Modified independent   Additional items HOB elevated   Sit to Supine 6  Modified independent   Additional items HOB elevated   Transfers   Sit to Stand 4  Minimal assistance   Additional items Assist x 1;Increased time required;Verbal cues  (RW)   Stand to Sit 5  Supervision   Additional items Increased time required;Verbal cues;Other  (RW)   Functional Mobility   Functional Mobility 4  Minimal assistance   Additional " Comments Moments of unsteadiness, somewhat impulsive. Education provided on slowly turning and to keep RW on ground. Hopped - completed as NWB given no formal order in chart for WB to heel.   Additional items Rolling walker   Balance   Static Sitting Normal   Dynamic Sitting Good   Static Standing Fair -   Dynamic Standing Poor +   Ambulatory Poor +   Activity Tolerance   Activity Tolerance Patient tolerated treatment well;Patient limited by fatigue  (HR 80's up to 111 with activity. SpO2 >93% thoughout.)   Medical Staff Made Aware Nithin PT   Nurse Made Aware Yes   RUE Assessment   RUE Assessment WFL   Edema   RUE Edema Non-pitting   LUE Assessment   LUE Assessment WFL   Edema   LUE Edema Non-pitting   Hand Function   Gross Motor Coordination Functional   Fine Motor Coordination Functional   Sensation   Light Touch No apparent deficits   Vision - Complex Assessment   Ocular Range of Motion Intact   Psychosocial   Psychosocial (WDL) WDL   Perception   Inattention/Neglect Appears intact   Cognition   Overall Cognitive Status WFL   Arousal/Participation Alert;Responsive;Cooperative   Attention Attends with cues to redirect   Orientation Level Oriented X4   Memory Within functional limits;Decreased recall of precautions   Following Commands Follows one step commands without difficulty   Comments Requiring frequent cues for safety with impuslivity noted during ambulation impacting fall risk.   Assessment   Limitation Decreased ADL status;Decreased Safe judgement during ADL;Decreased endurance;Decreased self-care trans;Decreased high-level ADLs   Prognosis Good   Assessment Patient is a 59 y.o. year old female seen for OT eval s/p admit to [facility] on 4/1/2024 with gangrene of toe s/p R hallux amputation 4/2. OT consulted to assess ADLs/IADLs/functional mobility and assist w/ D/C planning.  Patient demonstrates the following deficits impacting occupational performance: weakness, decreased strength , decreased balance,  decreased activity tolerance, limited functional reach, impulsivity, decreased safety awareness, increased pain, increased body habitus , impaired coordination, and decreased skin integrity . These impairments, as well at pt’s limited home support, difficulty performing ADLs, difficulty performing IADLs, difficulty performing transfers/mobility, WBS, fall risk , functional decline , increased reliance on DME , new use of AD for functional transfers/mobility, multiple admissions , inability to perform caregiver duties , and Inability to perform current job functions , limit pt’s ability to safely engage in all baseline areas of occupation. Pt's CLOF as follows: eating/grooming: Deanna, UB ADLs: supervision , LB ADLs: Ailyn, toileting: supervision , bed mobility: Deanna, functional transfers: supervision  and Ailyn, functional mobility: Ailyn, sitting/standing tolerance: ~3 min standing w b/l UE support. Pt would benefit from continued skilled OT while in acute setting to address deficits as defined above and to maximize (I) w/ ADLs/functional mobility. Occupational performance areas to address include: grooming, bathing/shower, toilet hygiene, dressing, functional mobility, community mobility, meal prep, household maintenance, and job performance/volunteering. Based on the aforementioned evaluation, functional performance deficits, and assessments, pt has been identified as a high complexity evaluation. At this time, recommendation for pt to receive post-acute rehabilitation services at a Level III (minimum resource intensity) due to above deficits and CLOF. OT will continue to follow pt 2-5x/wk to address the goals listed below to  w/in 10-14 days.   Goals   Patient Goals to start moving   LTG Time Frame 10-14   Plan   Treatment Interventions ADL retraining;Functional transfer training;UE strengthening/ROM;Endurance training;Patient/family training;Equipment evaluation/education;Neuromuscular reeducation;Compensatory  technique education;Continued evaluation;Activityengagement;Energy conservation   Goal Expiration Date 04/17/24   OT Treatment Day 0   OT Frequency 3-5x/wk;2-3x/wk   Discharge Recommendation   Rehab Resource Intensity Level, OT III (Minimum Resource Intensity)   Equipment Recommended Bedside commode  (manual wheelchair)   Commode Type Wide   AM-PAC Daily Activity Inpatient   Lower Body Dressing 3   Bathing 3   Toileting 3   Upper Body Dressing 3   Grooming 4   Eating 4   Daily Activity Raw Score 20   Daily Activity Standardized Score (Calc for Raw Score >=11) 42.03   AM-PAC Applied Cognition Inpatient   Following a Speech/Presentation 4   Understanding Ordinary Conversation 4   Taking Medications 4   Remembering Where Things Are Placed or Put Away 4   Remembering List of 4-5 Errands 4   Taking Care of Complicated Tasks 4   Applied Cognition Raw Score 24   Applied Cognition Standardized Score 62.21     Occupational Therapy goals: In 7-14 days:     1- Patient will verbalize and demonstrate use of energy conservation/deep breathing technique and work simplification skills during functional activity with no verbal cues.   2- Patient will verbalize and demonstrate good body mechanics and joint protection techniques during ADLs/IADLs with no verbal cues   3- Pt will complete bed mobility at a Mod I level w/ G balance/safety demonstrated to decrease caregiver assistance required   4- Patient will increase OOB/ sitting tolerance to 2-4 hours per day for increased participation in self care and leisure tasks with no s/s of exertion.   5-Patient will increase standing tolerance time to 5 minutes with unilateral UE support to complete sink level ADLs@ mod I level    6- Pt will improve functional transfers to Mod I on/off all surfaces using DME as needed w/ G balance/safety   7- Patient will complete UB ADLs with Joe utilizing appropriate DME/AE PRN   8- Patient will complete LB ADLs with Joe utilizing appropriate DME/AE PRN    9- Patient will complete toileting tasks with Joe with G hygiene/thoroughness utilizing appropriate DME/AE PRN   10- Pt will improve functional mobility during ADL/IADL/leisure tasks to Mod I using DME as needed w/ G balance/safety    11- Pt will be attentive 100% of the time during ongoing cognitive assessment w/ G participation to assist w/ safe d/c planning/recommendations   12- Pt will participate in simulated IADL management task to increase independence to Mod I w/ G safety and endurance   13- Pt will increase BUE strength by 1MM grade via AROM/AAROM/PROM exercises to increase independence in ADLs and transfers    14- Pt will demonstrate 100% adherence to WBS (NWB) during all functional activities w/o cues from therapist       KAMLA Smiley/GIULIANO

## 2024-04-03 NOTE — OCCUPATIONAL THERAPY NOTE
Occupational Therapy Evaluation     Patient Name: Georgie King  Today's Date: 4/3/2024  Problem List  Principal Problem:    Gangrene of toe (Formerly [facility])  Active Problems:    PAD (peripheral artery disease) (Formerly [facility])    Type 2 diabetes mellitus, without long-term current use of insulin (Formerly [facility])    Preop cardiovascular exam    Past Medical History  Past Medical History:   Diagnosis Date    Acute ischemia of Right great toe 02/21/2024    Dyslipidemia 02/21/2024    PAD (peripheral artery disease) (Formerly [facility]) 02/19/2024    Tobacco abuse 02/19/2024    Type 2 diabetes mellitus, without long-term current use of insulin (Formerly [facility]) 02/19/2024     Past Surgical History  Past Surgical History:   Procedure Laterality Date    APPENDECTOMY      IR LOWER EXTREMITY ANGIOGRAM  2/21/2024    IR LOWER EXTREMITY ANGIOGRAM  2/21/2024    THROMBECTOMY W/ EMBOLECTOMY Right 2/21/2024    Procedure: EMBOLECTOMY/THROMBECTOMY LOWER EXTREMITY Right;  Surgeon: Linwood Hayes MD;  [facility];  Service: Vascular    TOE AMPUTATION Right 4/2/2024    Procedure: AMPUTATION great TOE;  Surgeon: Beni Veliz DPM;  [facility];  Service: Podiatry        04/03/24 0820   OT Last Visit   OT Visit Date 04/03/24   Note Type   Note type Evaluation   Pain Assessment   Pain Assessment Tool 0-10   Pain Score 5   Pain Location/Orientation Orientation: Right;Location: [facility] Pain Intervention(s) Ambulation/increased activity;Repositioned;Emotional support;Rest   Restrictions/Precautions   Weight Bearing Precautions Per Order Yes   RLE Weight Bearing Per Order NWB  (Per RN and pt, MD with verbal orders for WBAT to R heel only.)   Other Precautions WBS;Multiple lines;Fall Risk;Pain;Impulsive   Home Living   Type of Home House   Home Layout Two level;Able to live on main level with bedroom/bathroom;Bed/bath upstairs;Ramped entrance;Other (Comment)   Bathroom Shower/Tub Walk-in shower   Bathroom Toilet Standard   Bathroom Equipment Built-in shower seat  "  Home Equipment Walker;Other (Comment)  (Rollator)   Prior Function   Level of [place] Independent with ADLs;Independent with functional mobility;Other (Comment)  (Shares IADLs w spouse)   Lives With Spouse   Receives Help From Family   IADLs Independent with driving;Independent with meal prep;Independent with medication management   Falls in the last 6 months 0   Vocational Other (Comment)  (Previously part time employed, hoping to return to work)   Lifestyle   Autonomy PTA, was (I) with ADLs, shares IADLs w spouse. Patient lives in a 2 SH w full bathroom on main floor - walk in shower w built in shower seat, standard toilet. 0 HERSON. Has RW, rollator. Spouse works nights.   Reciprocal Relationships spouse   General   Additional Pertinent History Comorbidities affecting pt’s functional performance include a significant PMH of: PAD, DM2, obesity, HTN.  Recent agram completed 2/21/24. Patient with active OT orders and activity orders for Up and OOB as tolerated.   Family/Caregiver Present Yes   Additional General Comments spouse present.   Subjective   Subjective \"I really haven't been up yet\"   ADL   Where Assessed Edge of bed   Eating Assistance 7  Independent   Grooming Assistance 6  Modified Independent   UB Bathing Assistance 5  Supervision/Setup   LB Bathing Assistance 4  Minimal Assistance   UB Dressing Assistance 5  Supervision/Setup   LB Dressing Assistance 4  Minimal Assistance   Toileting Assistance  5  Supervision/Setup   Bed Mobility   Supine to Sit 6  Modified independent   Additional items HOB elevated   Sit to Supine 6  Modified independent   Additional items HOB elevated   Transfers   Sit to Stand 4  Minimal assistance   Additional items Assist x 1;Increased time required;Verbal cues  (RW)   Stand to Sit 5  Supervision   Additional items Increased time required;Verbal cues;Other  (RW)   Functional Mobility   Functional Mobility 4  Minimal assistance   Additional Comments Moments of unsteadiness, " somewhat impulsive. Education provided on slowly turning and to keep RW on ground. Hopped - completed as NWB given no formal order in chart for WB to heel.   Additional items Rolling walker   Balance   Static Sitting Normal   Dynamic Sitting Good   Static Standing Fair -   Dynamic Standing Poor +   Ambulatory Poor +   Activity Tolerance   Activity Tolerance Patient tolerated treatment well;Patient limited by fatigue  (HR 80's up to 111 with activity. SpO2 >93% thoughout.)   Medical Staff Made Aware Nithin PT   Nurse Made Aware Yes   RUE Assessment   RUE Assessment WFL   Edema   RUE Edema Non-pitting   LUE Assessment   LUE Assessment WFL   Edema   LUE Edema Non-pitting   Hand Function   Gross Motor Coordination Functional   Fine Motor Coordination Functional   Sensation   Light Touch No apparent deficits   Vision - Complex Assessment   Ocular Range of Motion Intact   Psychosocial   Psychosocial (WDL) WDL   Perception   Inattention/Neglect Appears intact   Cognition   Overall Cognitive Status WFL   Arousal/Participation Alert;Responsive;Cooperative   Attention Attends with cues to redirect   Orientation Level Oriented X4   Memory Within functional limits;Decreased recall of precautions   Following Commands Follows one step commands without difficulty   Comments Requiring frequent cues for safety with impuslivity noted during ambulation impacting fall risk.   Assessment   Limitation Decreased ADL status;Decreased Safe judgement during ADL;Decreased endurance;Decreased self-care trans;Decreased high-level ADLs   Prognosis Good   Assessment Patient is a 59 y.o. year old female seen for OT eval s/p admit to [facility] on 4/1/2024 with gangrene of toe s/p R hallux amputation 4/2. OT consulted to assess ADLs/IADLs/functional mobility and assist w/ D/C planning.  Patient demonstrates the following deficits impacting occupational performance: weakness, decreased strength , decreased balance, decreased activity tolerance, limited  functional reach, impulsivity, decreased safety awareness, increased pain, increased body habitus , impaired coordination, and decreased skin integrity . These impairments, as well at pt’s limited home support, difficulty performing ADLs, difficulty performing IADLs, difficulty performing transfers/mobility, WBS, fall risk , functional decline , increased reliance on DME , new use of AD for functional transfers/mobility, multiple admissions , inability to perform caregiver duties , and Inability to perform current job functions , limit pt’s ability to safely engage in all baseline areas of occupation. Pt's CLOF as follows: eating/grooming: Deanna, UB ADLs: supervision , LB ADLs: Ailyn, toileting: supervision , bed mobility: Deanna, functional transfers: supervision  and Ailyn, functional mobility: Ailyn, sitting/standing tolerance: ~3 min standing w b/l UE support. Pt would benefit from continued skilled OT while in acute setting to address deficits as defined above and to maximize (I) w/ ADLs/functional mobility. Occupational performance areas to address include: grooming, bathing/shower, toilet hygiene, dressing, functional mobility, community mobility, meal prep, household maintenance, and job performance/volunteering. Based on the aforementioned evaluation, functional performance deficits, and assessments, pt has been identified as a high complexity evaluation. At this time, recommendation for pt to receive post-acute rehabilitation services at a Level III (minimum resource intensity) due to above deficits and CLOF. OT will continue to follow pt 2-5x/wk to address the goals listed below to  w/in 10-14 days.   Goals   Patient Goals to start moving   LTG Time Frame 10-14   Plan   Treatment Interventions ADL retraining;Functional transfer training;UE strengthening/ROM;Endurance training;Patient/family training;Equipment evaluation/education;Neuromuscular reeducation;Compensatory technique education;Continued  evaluation;Activityengagement;Energy conservation   Goal Expiration Date 04/17/24   OT Treatment Day 0   OT Frequency 3-5x/wk;2-3x/wk   Discharge Recommendation   Rehab Resource Intensity Level, OT III (Minimum Resource Intensity)   Equipment Recommended Bedside commode  (manual wheelchair)   Commode Type Wide   AM-PAC Daily Activity Inpatient   Lower Body Dressing 3   Bathing 3   Toileting 3   Upper Body Dressing 3   Grooming 4   Eating 4   Daily Activity Raw Score 20   Daily Activity Standardized Score (Calc for Raw Score >=11) 42.03   AM-PAC Applied Cognition Inpatient   Following a Speech/Presentation 4   Understanding Ordinary Conversation 4   Taking Medications 4   Remembering Where Things Are Placed or Put Away 4   Remembering List of 4-5 Errands 4   Taking Care of Complicated Tasks 4   Applied Cognition Raw Score 24   Applied Cognition Standardized Score 62.21     Occupational Therapy goals: In 7-14 days:     1- Patient will verbalize and demonstrate use of energy conservation/deep breathing technique and work simplification skills during functional activity with no verbal cues.   2- Patient will verbalize and demonstrate good body mechanics and joint protection techniques during ADLs/IADLs with no verbal cues   3- Pt will complete bed mobility at a Mod I level w/ G balance/safety demonstrated to decrease caregiver assistance required   4- Patient will increase OOB/ sitting tolerance to 2-4 hours per day for increased participation in self care and leisure tasks with no s/s of exertion.   5-Patient will increase standing tolerance time to 5 minutes with unilateral UE support to complete sink level ADLs@ mod I level    6- Pt will improve functional transfers to Mod I on/off all surfaces using DME as needed w/ G balance/safety   7- Patient will complete UB ADLs with Joe utilizing appropriate DME/AE PRN   8- Patient will complete LB ADLs with Joe utilizing appropriate DME/AE PRN   9- Patient will complete  toileting tasks with Joe with G hygiene/thoroughness utilizing appropriate DME/AE PRN   10- Pt will improve functional mobility during ADL/IADL/leisure tasks to Mod I using DME as needed w/ G balance/safety    11- Pt will be attentive 100% of the time during ongoing cognitive assessment w/ G participation to assist w/ safe d/c planning/recommendations   12- Pt will participate in simulated IADL management task to increase independence to Mod I w/ G safety and endurance   13- Pt will increase BUE strength by 1MM grade via AROM/AAROM/PROM exercises to increase independence in ADLs and transfers     14- Pt will demonstrate 100% adherence to WBS (NWB) during all functional activities w/o cues from therapist      KAMLA Smiley/GIULIANO

## 2024-04-03 NOTE — PLAN OF CARE
Problem: OCCUPATIONAL THERAPY ADULT  Goal: Performs self-care activities at highest level of function for planned discharge setting.  See evaluation for individualized goals.  Description: Treatment Interventions: ADL retraining, Functional transfer training, UE strengthening/ROM, Endurance training, Patient/family training, Equipment evaluation/education, Neuromuscular reeducation, Compensatory technique education, Continued evaluation, Activityengagement, Energy conservation  Equipment Recommended: Bedside commode (manual wheelchair)       See flowsheet documentation for full assessment, interventions and recommendations.   4/3/2024 1454 by Karla Mtz OT  Outcome: Progressing  Note: Limitation: Decreased ADL status, Decreased Safe judgement during ADL, Decreased endurance, Decreased self-care trans, Decreased high-level ADLs  Prognosis: Good  Assessment: Patient is a 59 y.o. year old female seen for OT eval s/p admit to [facility] on 4/1/2024 with gangrene of toe s/p R hallux amputation 4/2. OT consulted to assess ADLs/IADLs/functional mobility and assist w/ D/C planning.  Patient demonstrates the following deficits impacting occupational performance: weakness, decreased strength , decreased balance, decreased activity tolerance, limited functional reach, impulsivity, decreased safety awareness, increased pain, increased body habitus , impaired coordination, and decreased skin integrity . These impairments, as well at pt’s limited home support, difficulty performing ADLs, difficulty performing IADLs, difficulty performing transfers/mobility, WBS, fall risk , functional decline , increased reliance on DME , new use of AD for functional transfers/mobility, multiple admissions , inability to perform caregiver duties , and Inability to perform current job functions , limit pt’s ability to safely engage in all baseline areas of occupation. Pt's CLOF as follows: eating/grooming: Deanna, UB ADLs: supervision , LB ADLs:  Ailyn, toileting: supervision , bed mobility: Deanna, functional transfers: supervision  and Ailyn, functional mobility: Ailyn, sitting/standing tolerance: ~3 min standing w b/l UE support. Pt would benefit from continued skilled OT while in acute setting to address deficits as defined above and to maximize (I) w/ ADLs/functional mobility. Occupational performance areas to address include: grooming, bathing/shower, toilet hygiene, dressing, functional mobility, community mobility, meal prep, household maintenance, and job performance/volunteering. Based on the aforementioned evaluation, functional performance deficits, and assessments, pt has been identified as a high complexity evaluation. At this time, recommendation for pt to receive post-acute rehabilitation services at a Level III (minimum resource intensity) due to above deficits and CLOF. OT will continue to follow pt 2-5x/wk to address the goals listed below to  w/in 10-14 days.     Rehab Resource Intensity Level, OT: III (Minimum Resource Intensity)       4/3/2024 1453 by Karla Mtz OT  Note: Limitation: Decreased ADL status, Decreased Safe judgement during ADL, Decreased endurance, Decreased self-care trans, Decreased high-level ADLs  Prognosis: Good  Assessment: Patient is a 59 y.o. year old female seen for OT eval s/p admit to [facility] on 2024 with gangrene of toe s/p R hallux amputation . OT consulted to assess ADLs/IADLs/functional mobility and assist w/ D/C planning.  Patient demonstrates the following deficits impacting occupational performance: weakness, decreased strength , decreased balance, decreased activity tolerance, limited functional reach, impulsivity, decreased safety awareness, increased pain, increased body habitus , impaired coordination, and decreased skin integrity . These impairments, as well at pt’s limited home support, difficulty performing ADLs, difficulty performing IADLs, difficulty performing transfers/mobility, WBS,  fall risk , functional decline , increased reliance on DME , new use of AD for functional transfers/mobility, multiple admissions , inability to perform caregiver duties , and Inability to perform current job functions , limit pt’s ability to safely engage in all baseline areas of occupation. Pt's CLOF as follows: eating/grooming: Deanna, UB ADLs: supervision , LB ADLs: Ailyn, toileting: supervision , bed mobility: Deanna, functional transfers: supervision  and Ailyn, functional mobility: Ailyn, sitting/standing tolerance: ~3 min standing w b/l UE support. Pt would benefit from continued skilled OT while in acute setting to address deficits as defined above and to maximize (I) w/ ADLs/functional mobility. Occupational performance areas to address include: grooming, bathing/shower, toilet hygiene, dressing, functional mobility, community mobility, meal prep, household maintenance, and job performance/volunteering. Based on the aforementioned evaluation, functional performance deficits, and assessments, pt has been identified as a high complexity evaluation. At this time, recommendation for pt to receive post-acute rehabilitation services at a Level III (minimum resource intensity) due to above deficits and CLOF. OT will continue to follow pt 2-5x/wk to address the goals listed below to  w/in 10-14 days.     Rehab Resource Intensity Level, OT: III (Minimum Resource Intensity)

## 2024-04-03 NOTE — PROGRESS NOTES
"Podiatry - Progress Note  Patient: Georgie King 59 y.o. female   MRN: [id number]  PCP: Bassem Barton DO  Unit/Bed#: [address] 223-01 Encounter: [id number]  Date Of Visit: 24    ASSESSMENT:    Georgie King is a 59 y.o. female with:    Gangrene of Right great toe.   - s/p R Hallux amputation (DOS 24)  Cellulitis Right great toe  PAD  - S/p agram 24  History of tobacco use.      PLAN:    POD 1 s/p Right hallux amputation. Dressings clean, dry, and intact.   Plan for post-operative dressing change .   Per vascular surgery, continue plavix and lipitor for medical management of PAD. No vascular surgical intervention planned at this time.   Elevation and offloading on green foam wedges or pillows when non-ambulatory.  Appreciate consulting services for recommendations and management.     Antibiotics started: Ancef (completed)  Pharmacologic VTE Prophylaxis: Sequential compression device (Venodyne)    Mechanical VTE Prophylaxis: sequential compression device   Weightbearing status: Non-weightbearing      Disposition:  Patient requires >2 midnight stay for the above    SUBJECTIVE:     The patient was seen, evaluated, and assessed at bedside today. The patient was awake, alert, and in no acute distress. No acute events overnight. The patient reports that she is feeling well, mild pain in right foot. Patient denies N/V/F/chills/SOB/CP.      OBJECTIVE:     Vitals:   /78   Pulse 79   Temp 97.8 °F (36.6 °C)   Resp 19   Ht 5' 1\" (1.549 m)   Wt 113 kg (249 lb 1.9 oz)   SpO2 95%   BMI 47.07 kg/m²     Temp (24hrs), Av °F (36.7 °C), Min:97.4 °F (36.3 °C), Max:99 °F (37.2 °C)      Physical Exam:     Lungs: Non labored breathing  Abdomen: Soft, non-tender.  Lower Extremity:  Cardiovascular status at baseline from admission.  Neurological status at baseline from admission.  Musculoskeletal status at baseline from admission. No calf tenderness noted.     RLE: Dressing clean, dry, and intact with " "no visible strike-through. No ascending erythema.     Additional Data:     Labs:    Results from last 7 days   Lab Units 04/01/24  1133   WBC Thousand/uL 11.57*   HEMOGLOBIN g/dL 10.9*   HEMATOCRIT % 35.2   PLATELETS Thousands/uL 432*   NEUTROS PCT % 64   LYMPHS PCT % 29   MONOS PCT % 5   EOS PCT % 1     Results from last 7 days   Lab Units 04/01/24  1133   POTASSIUM mmol/L 3.8   CHLORIDE mmol/L 108   CO2 mmol/L 22   BUN mg/dL 14   CREATININE mg/dL 0.76   CALCIUM mg/dL 9.4   ALK PHOS U/L 89   ALT U/L 11   AST U/L 9*     Results from last 7 days   Lab Units 04/01/24  1133   INR  1.63*       * I Have Reviewed All Lab Data Listed Above.    Recent Cultures (last 7 days):     Results from last 7 days   Lab Units 04/01/24  1142 04/01/24  1133   BLOOD CULTURE  No Growth at 24 hrs. No Growth at 24 hrs.           Imaging: I have personally reviewed pertinent films in PACS  EKG, Pathology, and Other Studies: I have personally reviewed pertinent reports.    ** Please Note: Portions of the record may have been created with voice recognition software. Occasional wrong word or \"sound a like\" substitutions may have occurred due to the inherent limitations of voice recognition software. Read the chart carefully and recognize, using context, where substitutions have occurred. **      "

## 2024-04-03 NOTE — ASSESSMENT & PLAN NOTE
58 yo female ex-smoker w/ hx of HTN, dyslipidemia, obesity, DM II and severe PAD presented to [facility] on 4/1/24 w/ purulent drainage from base of R great toe. Pt has a hx of R great toe gangrene progressing over the past 2 months. Pt was hospitalized in 2/2024 w/ acute R toe pain and discoloration concerning for atheroembolic phenomenon. After vascular intervention, R great toe was allowed time to demarcate. Pt admitted by podiatry for gangrene and cellulitis of R great toe. Pt was started on IV ABX w/ plan for R hallux amputation. S/P R hallux amputation on 4/2 by podiatry.    Vascular surgery consulted for R great toe pain. Pt was hospitalized in 2/2024 w/ acute R toe pain and discoloration concerning for atheroembolic phenomenon for which she is S/P EVAR, RLE pop/TPT thrombectomy, R SFA stent and PT/AT angioplasty in OR on 2/21. LEAD shows >75% stenosis in prox PT w/ 50-75% stenosis in distal PT.     Diagnostics:  -4/1/24 LEAD: Right: Patent proximal SFA stent. Evidence of diffuse arterial occlusive disease throughout the remaining femoropopliteal segments without focal stenosis. Evidence of tibioperoneal disease with a >75% stenosis in the proximal and 50-75% stenosis in the distal segments of the PT artery. R EVELIA: 0.72/63/2nd toe 28. Left: L EVELIA: 0.97/127/91  -4/1/24 Xray R great toe: Noticeable interval change in the appearance of the soft tissues of the great toe which now appear either atrophied or debrided. The underlying bone appears intact.    Plan:  -R great toe gangrene  -Underlying PAOD w/ atheroembolic phenomenon S/P recent revascularization on 2/21  -S/P R hallux amputation w/ podiatry on 4/2; intra-op bleeding adequate  -LEAD shows >75% stenosis in prox PT w/ 50-75% stenosis in distal PT  -Continue plavix and lipitor for medical management of PAD  -Xarelto for PAD on hold secondary to planned podiatric surgical intervention  -No vascular surgical intervention at this time  -Will sign off. Please contact  vascular surgery for any questions or concerns  -Will follow up in vascular surgery office in one week to eval healing of amputation site and discuss possible need for RLE angiogram as outpt  -Will discuss w/ Dr. Moreland

## 2024-04-03 NOTE — ASSESSMENT & PLAN NOTE
Lab Results   Component Value Date    HGBA1C 12.3 (H) 02/19/2024       Recent Labs     04/02/24  2124 04/03/24  0725 04/03/24  1138 04/03/24  1624   POCGLU 104 114 162* 117         Blood Sugar Average: Last 72 hrs:  (P) 122.0553370724810779    She is well controlled in the hospital  She needs better outpatient control  Will discuss her diet.

## 2024-04-03 NOTE — PLAN OF CARE
Problem: OCCUPATIONAL THERAPY ADULT  Goal: Performs self-care activities at highest level of function for planned discharge setting.  See evaluation for individualized goals.  Description: Treatment Interventions: ADL retraining, Functional transfer training, UE strengthening/ROM, Endurance training, Patient/family training, Equipment evaluation/education, Neuromuscular reeducation, Compensatory technique education, Continued evaluation, Activityengagement, Energy conservation  Equipment Recommended: Bedside commode (manual wheelchair)       See flowsheet documentation for full assessment, interventions and recommendations.   Note: Limitation: Decreased ADL status, Decreased Safe judgement during ADL, Decreased endurance, Decreased self-care trans, Decreased high-level ADLs  Prognosis: Good  Assessment: Patient is a 59 y.o. year old female seen for OT eval s/p admit to [facility] on 4/1/2024 with gangrene of toe s/p R hallux amputation 4/2. OT consulted to assess ADLs/IADLs/functional mobility and assist w/ D/C planning.  Patient demonstrates the following deficits impacting occupational performance: weakness, decreased strength , decreased balance, decreased activity tolerance, limited functional reach, impulsivity, decreased safety awareness, increased pain, increased body habitus , impaired coordination, and decreased skin integrity . These impairments, as well at pt’s limited home support, difficulty performing ADLs, difficulty performing IADLs, difficulty performing transfers/mobility, WBS, fall risk , functional decline , increased reliance on DME , new use of AD for functional transfers/mobility, multiple admissions , inability to perform caregiver duties , and Inability to perform current job functions , limit pt’s ability to safely engage in all baseline areas of occupation. Pt's CLOF as follows: eating/grooming: Deanna, UB ADLs: supervision , LB ADLs: Ailyn, toileting: supervision , bed mobility: Deanna, functional  transfers: supervision  and Ailyn, functional mobility: Ailyn, sitting/standing tolerance: ~3 min standing w b/l UE support. Pt would benefit from continued skilled OT while in acute setting to address deficits as defined above and to maximize (I) w/ ADLs/functional mobility. Occupational performance areas to address include: grooming, bathing/shower, toilet hygiene, dressing, functional mobility, community mobility, meal prep, household maintenance, and job performance/volunteering. Based on the aforementioned evaluation, functional performance deficits, and assessments, pt has been identified as a high complexity evaluation. At this time, recommendation for pt to receive post-acute rehabilitation services at a Level III (minimum resource intensity) due to above deficits and CLOF. OT will continue to follow pt 2-5x/wk to address the goals listed below to  w/in 10-14 days.     Rehab Resource Intensity Level, OT: III (Minimum Resource Intensity)

## 2024-04-03 NOTE — PROGRESS NOTES
[facility]  Progress Note  Name: Georgie King I  MRN: [id number]  Unit/Bed#: [place] [address] [address] I Date of Admission: 2024   Date of Service: 4/3/2024 I Hospital Day: 2    Assessment/Plan   * Gangrene of toe (HCC)  Assessment & Plan  Had amputation yesterday  Doing very well.  Pain is controlled.      Type 2 diabetes mellitus, without long-term current use of insulin (HCC)  Assessment & Plan  Lab Results   Component Value Date    HGBA1C 12.3 (H) 2024       Recent Labs     24  2124 24  0725 24  1138 24  1624   POCGLU 104 114 162* 117         Blood Sugar Average: Last 72 hrs:  (P) [id number]    She is well controlled in the hospital  She needs better outpatient control  Will discuss her diet.             Subjective:   Doing well  Minimal pain to the foot.  No fever nor chills.      Objective:     Vitals:   Temp (24hrs), Av.2 °F (36.8 °C), Min:97.7 °F (36.5 °C), Max:99 °F (37.2 °C)    Temp:  [97.7 °F (36.5 °C)-99 °F (37.2 °C)] 98.1 °F (36.7 °C)  HR:  [72-88] 88  Resp:  [16-24] 18  BP: (127-168)/(62-78) 127/72  SpO2:  [95 %-99 %] 95 %  Body mass index is 47.07 kg/m².     Input and Output Summary (last 24 hours):       Intake/Output Summary (Last 24 hours) at 4/3/2024 1653  Last data filed at 4/3/2024 1045  Gross per 24 hour   Intake 700 ml   Output 1450 ml   Net -750 ml       Physical Exam:     Physical Exam  Vitals and nursing note reviewed.   HENT:      Head: Normocephalic and atraumatic.   Eyes:      Pupils: Pupils are equal, round, and reactive to light.   Cardiovascular:      Rate and Rhythm: Normal rate and regular rhythm.      Heart sounds: No murmur heard.     No friction rub. No gallop.   Pulmonary:      Effort: Pulmonary effort is normal.      Breath sounds: Normal breath sounds. No wheezing or rales.   Abdominal:      General: Bowel sounds are normal.      Palpations: Abdomen is soft.      Tenderness: There is no abdominal tenderness.    Musculoskeletal:      Right lower leg: No edema.      Left lower leg: No edema.      Comments: Foot dressing C/D/I       .       Additional Data:     Labs:    Results from last 7 days   Lab Units 04/01/24  1133   WBC Thousand/uL 11.57*   HEMOGLOBIN g/dL 10.9*   HEMATOCRIT % 35.2   PLATELETS Thousands/uL 432*   NEUTROS PCT % 64   LYMPHS PCT % 29   MONOS PCT % 5   EOS PCT % 1     Results from last 7 days   Lab Units 04/01/24  1133   POTASSIUM mmol/L 3.8   CHLORIDE mmol/L 108   CO2 mmol/L 22   BUN mg/dL 14   CREATININE mg/dL 0.76   CALCIUM mg/dL 9.4   ALK PHOS U/L 89   ALT U/L 11   AST U/L 9*     Results from last 7 days   Lab Units 04/01/24  1133   INR  1.63*     Results from last 7 days   Lab Units 04/03/24  1624 04/03/24  1138 04/03/24  0725 04/02/24  2124 04/02/24  1715 04/02/24  1153 04/02/24  0722 04/01/24  2125 04/01/24  1600   POC GLUCOSE mg/dl 117 162* 114 104 93 103 122 114 174*               * I Have Reviewed All Lab Data     Recent Cultures (last 7 days):     Results from last 7 days   Lab Units 04/01/24  1142 04/01/24  1133   BLOOD CULTURE  No Growth at 24 hrs. No Growth at 24 hrs.         Last 24 Hours Medication List:   Current Facility-Administered Medications   Medication Dose Route Frequency Provider Last Rate    acetaminophen  650 mg Oral Q4H PRN Anisa Allen DPM      amLODIPine  5 mg Oral Daily Benjamín Stout DPM      atorvastatin  40 mg Oral Daily With Dinner Benjamín Stout DPM      clopidogrel  75 mg Oral Daily Benjamín Stout DPM      cyclobenzaprine  5 mg Oral TID Benjamín Stout DPM      glimepiride  2 mg Oral Daily With Breakfast Benjamín Stout DPM      heparin (porcine)  5,000 Units Subcutaneous Q8H OMID Benjamín Stout DPM      insulin lispro  1-6 Units Subcutaneous TID AC Benjamín Stout DPM      lisinopril  10 mg Oral Daily Benjamín Stout DPM      naloxone  0.04 mg Intravenous Q1MIN PRN Anisa Allen DPM      oxyCODONE  2.5 mg Oral  Q4H PRN Anisa Augustineiraky, DPM      Or    oxyCODONE  5 mg Oral Q4H PRN Anisa Augustineiraky, DPM           VTE Pharmacologic Prophylaxis:   Pharmacologic: Heparin      Current Length of Stay: 2 day(s)    Current Patient Status: Inpatient       Discharge Plan: home likely tomorrow.    Code Status: Level 1 - Full Code           Today, Patient Was Seen By: Isidro Real DO    ** Please Note: Dictation voice to text software may have been used in the creation of this document. **

## 2024-04-04 ENCOUNTER — TELEPHONE (OUTPATIENT)
Dept: OBGYN CLINIC | Facility: HOSPITAL | Age: 59
End: 2024-04-04

## 2024-04-04 ENCOUNTER — TRANSITIONAL CARE MANAGEMENT (OUTPATIENT)
Dept: INTERNAL MEDICINE CLINIC | Facility: CLINIC | Age: 59
End: 2024-04-04

## 2024-04-04 VITALS
RESPIRATION RATE: 20 BRPM | DIASTOLIC BLOOD PRESSURE: 76 MMHG | TEMPERATURE: 97.6 F | BODY MASS INDEX: 47.03 KG/M2 | OXYGEN SATURATION: 96 % | HEIGHT: 61 IN | SYSTOLIC BLOOD PRESSURE: 159 MMHG | WEIGHT: 249.12 LBS | HEART RATE: 77 BPM

## 2024-04-04 PROBLEM — Z01.810 PREOP CARDIOVASCULAR EXAM: Status: RESOLVED | Noted: 2024-04-02 | Resolved: 2024-04-04

## 2024-04-04 PROBLEM — I96 GANGRENE OF TOE (HCC): Status: RESOLVED | Noted: 2024-03-18 | Resolved: 2024-04-04

## 2024-04-04 LAB — GLUCOSE SERPL-MCNC: 126 MG/DL (ref 65–140)

## 2024-04-04 PROCEDURE — 0Y6P0Z0 DETACHMENT AT RIGHT 1ST TOE, COMPLETE, OPEN APPROACH: ICD-10-PCS | Performed by: PODIATRIST

## 2024-04-04 PROCEDURE — 82948 REAGENT STRIP/BLOOD GLUCOSE: CPT

## 2024-04-04 PROCEDURE — NC001 PR NO CHARGE: Performed by: PODIATRIST

## 2024-04-04 RX ORDER — IBUPROFEN 600 MG/1
600 TABLET ORAL EVERY 6 HOURS PRN
Qty: 30 TABLET | Refills: 0 | Status: SHIPPED | OUTPATIENT
Start: 2024-04-04

## 2024-04-04 RX ORDER — ACETAMINOPHEN 325 MG/1
650 TABLET ORAL EVERY 4 HOURS PRN
Qty: 30 TABLET | Refills: 0 | Status: SHIPPED | OUTPATIENT
Start: 2024-04-04

## 2024-04-04 RX ORDER — CYCLOBENZAPRINE HCL 5 MG
5 TABLET ORAL 3 TIMES DAILY
Qty: 30 TABLET | Refills: 0 | Status: SHIPPED | OUTPATIENT
Start: 2024-04-04 | End: 2024-04-14

## 2024-04-04 RX ADMIN — LISINOPRIL 10 MG: 10 TABLET ORAL at 08:21

## 2024-04-04 RX ADMIN — CLOPIDOGREL BISULFATE 75 MG: 75 TABLET ORAL at 08:21

## 2024-04-04 RX ADMIN — OXYCODONE 5 MG: 5 TABLET ORAL at 08:23

## 2024-04-04 RX ADMIN — AMLODIPINE BESYLATE 5 MG: 5 TABLET ORAL at 08:21

## 2024-04-04 RX ADMIN — HEPARIN SODIUM 5000 UNITS: 5000 INJECTION INTRAVENOUS; SUBCUTANEOUS at 05:30

## 2024-04-04 RX ADMIN — CYCLOBENZAPRINE HYDROCHLORIDE 5 MG: 10 TABLET, FILM COATED ORAL at 08:21

## 2024-04-04 RX ADMIN — GLIMEPIRIDE 2 MG: 2 TABLET ORAL at 08:26

## 2024-04-04 NOTE — DISCHARGE INSTR - AVS FIRST PAGE
Discharge Instructions - Podiatry    Weight Bearing Status: Weightbearing as tolerated in toe-offloading shoe. No strenuous activity or exercise.                        Pain: Continue analgesics as directed    Follow-up appointment instructions: Please make an appointment within one week of discharge with Dr. Veliz. Contact sooner if any increase in pain, or signs of infection occur    Patient Wound Care Instructions: Leave dressings clean, dry, and intact until 1st outpatient office visit. Please call if any persistent bleeding, or bleeding through dressing. Avoid getting dressing wet with bathing.

## 2024-04-04 NOTE — PLAN OF CARE
Problem: Potential for Falls  Goal: Patient will remain free of falls  Description: INTERVENTIONS:  - Educate patient/family on patient safety including physical limitations  - Instruct patient to call for assistance with activity   - Consult OT/PT to assist with strengthening/mobility   - Keep Call bell within reach  - Keep bed low and locked with side rails adjusted as appropriate  - Keep care items and personal belongings within reach  - Initiate and maintain comfort rounds  - Make Fall Risk Sign visible to staff  - Offer Toileting every 2 Hours, in advance of need  - Initiate/Maintain bed alarm  - Obtain necessary fall risk management equipment: bed alarm  - Apply yellow socks and bracelet for high fall risk patients  - Consider moving patient to room near nurses station  Outcome: Progressing     Problem: PAIN - ADULT  Goal: Verbalizes/displays adequate comfort level or baseline comfort level  Description: Interventions:  - Encourage patient to monitor pain and request assistance  - Assess pain using appropriate pain scale  - Administer analgesics based on type and severity of pain and evaluate response  - Implement non-pharmacological measures as appropriate and evaluate response  - Consider cultural and social influences on pain and pain management  - Notify physician/advanced practitioner if interventions unsuccessful or patient reports new pain  Outcome: Progressing     Problem: INFECTION - ADULT  Goal: Absence or prevention of progression during hospitalization  Description: INTERVENTIONS:  - Assess and monitor for signs and symptoms of infection  - Monitor lab/diagnostic results  - Monitor all insertion sites, i.e. indwelling lines, tubes, and drains  - Monitor endotracheal if appropriate and nasal secretions for changes in amount and color  - Mercer appropriate cooling/warming therapies per order  - Administer medications as ordered  - Instruct and encourage patient and family to use good hand  hygiene technique  - Identify and instruct in appropriate isolation precautions for identified infection/condition  Outcome: Progressing  Goal: Absence of fever/infection during neutropenic period  Description: INTERVENTIONS:  - Monitor WBC    Outcome: Progressing     Problem: SAFETY ADULT  Goal: Patient will remain free of falls  Description: INTERVENTIONS:  - Educate patient/family on patient safety including physical limitations  - Instruct patient to call for assistance with activity   - Consult OT/PT to assist with strengthening/mobility   - Keep Call bell within reach  - Keep bed low and locked with side rails adjusted as appropriate  - Keep care items and personal belongings within reach  - Initiate and maintain comfort rounds  - Make Fall Risk Sign visible to staff  - Offer Toileting every 2 Hours, in advance of need  - Initiate/Maintain bed alarm  - Obtain necessary fall risk management equipment: bed alarm  - Apply yellow socks and bracelet for high fall risk patients  - Consider moving patient to room near nurses station  Outcome: Progressing  Goal: Maintain or return to baseline ADL function  Description: INTERVENTIONS:  - Educate patient/family on patient safety including physical limitations  - Instruct patient to call for assistance with activity   - Consult OT/PT to assist with strengthening/mobility   - Keep Call bell within reach  - Keep bed low and locked with side rails adjusted as appropriate  - Keep care items and personal belongings within reach  - Initiate and maintain comfort rounds  - Make Fall Risk Sign visible to staff  - Offer Toileting every 2 Hours, in advance of need  - Initiate/Maintain bed alarm  - Obtain necessary fall risk management equipment: bed alarm  - Apply yellow socks and bracelet for high fall risk patients  - Consider moving patient to room near nurses station  Outcome: Progressing  Goal: Maintains/Returns to pre admission functional level  Description: INTERVENTIONS:  -  Perform AM-PAC 6 Click Basic Mobility/ Daily Activity assessment daily.  - Set and communicate daily mobility goal to care team and patient/family/caregiver.   - Collaborate with rehabilitation services on mobility goals if consulted  - Perform Range of Motion 3 times a day.  - Reposition patient every 2 hours.  - Dangle patient 3 times a day  - Stand patient 3 times a day  - Ambulate patient 3 times a day  - Out of bed to chair 3 times a day   - Out of bed for meals 3 times a day  - Out of bed for toileting  - Record patient progress and toleration of activity level   Outcome: Progressing     Problem: DISCHARGE PLANNING  Goal: Discharge to home or other facility with appropriate resources  Description: INTERVENTIONS:  - Identify barriers to discharge w/patient and caregiver  - Arrange for needed discharge resources and transportation as appropriate  - Identify discharge learning needs (meds, wound care, etc.)  - Arrange for interpretive services to assist at discharge as needed  - Refer to Case Management Department for coordinating discharge planning if the patient needs post-hospital services based on physician/advanced practitioner order or complex needs related to functional status, cognitive ability, or social support system  Outcome: Progressing     Problem: Knowledge Deficit  Goal: Patient/family/caregiver demonstrates understanding of disease process, treatment plan, medications, and discharge instructions  Description: Complete learning assessment and assess knowledge base.  Interventions:  - Provide teaching at level of understanding  - Provide teaching via preferred learning methods  Outcome: Progressing     Problem: SKIN/TISSUE INTEGRITY - ADULT  Goal: Skin Integrity remains intact(Skin Breakdown Prevention)  Description: Assess:  -Perform Marty assessment every shift  -Clean and moisturize skin every shift  -Inspect skin when repositioning, toileting, and assisting with ADLS  -Assess under medical  devices such as masimo every shift  -Assess extremities for adequate circulation and sensation     Bed Management:  -Have minimal linens on bed & keep smooth, unwrinkled  -Change linens as needed when moist or perspiring  -Avoid sitting or lying in one position for more than 2 hours while in bed  -Keep HOB at 30degrees     Toileting:  -Offer bedside commode  -Assess for incontinence every shift  -Use incontinent care products after each incontinent episode such as foam    Activity:  -Mobilize patient 3 times a day  -Encourage activity and walks on unit  -Encourage or provide ROM exercises   -Turn and reposition patient every 2 Hours  -Use appropriate equipment to lift or move patient in bed  -Instruct/ Assist with weight shifting every 2 when out of bed in chair  -Consider limitation of chair time 2 hour intervals    Skin Care:  -Avoid use of baby powder, tape, friction and shearing, hot water or constrictive clothing  -Relieve pressure over bony prominences using waffle cushion  -Do not massage red bony areas    Next Steps:  -Teach patient strategies to minimize risks such as skin breakdown   -Consider consults to  interdisciplinary teams such as nutrition  Outcome: Progressing  Goal: Incision(s), wounds(s) or drain site(s) healing without S/S of infection  Description: INTERVENTIONS  - Assess and document dressing, incision, wound bed, drain sites and surrounding tissue  - Provide patient and family education  - Perform skin care/dressing changes every day  Outcome: Progressing  Goal: Pressure injury heals and does not worsen  Description: Interventions:  - Implement low air loss mattress or specialty surface (Criteria met)  - Apply silicone foam dressing  - Instruct/assist with weight shifting every 120 minutes when in chair   - Limit chair time to 2 hour intervals  - Use special pressure reducing interventions such as wedges when in chair   - Apply fecal or urinary incontinence containment device   - Perform  passive or active ROM every day  - Turn and reposition patient & offload bony prominences every 2 hours   - Utilize friction reducing device or surface for transfers   - Consider consults to  interdisciplinary teams such as nutrition  - Use incontinent care products after each incontinent episode such as foam  - Consider nutrition services referral as needed  Outcome: Progressing     Problem: METABOLIC, FLUID AND ELECTROLYTES - ADULT  Goal: Electrolytes maintained within normal limits  Description: INTERVENTIONS:  - Monitor labs and assess patient for signs and symptoms of electrolyte imbalances  - Administer electrolyte replacement as ordered  - Monitor response to electrolyte replacements, including repeat lab results as appropriate  - Instruct patient on fluid and nutrition as appropriate  Outcome: Progressing  Goal: Fluid balance maintained  Description: INTERVENTIONS:  - Monitor labs   - Monitor I/O and WT  - Instruct patient on fluid and nutrition as appropriate  - Assess for signs & symptoms of volume excess or deficit  Outcome: Progressing  Goal: Glucose maintained within target range  Description: INTERVENTIONS:  - Monitor Blood Glucose as ordered  - Assess for signs and symptoms of hyperglycemia and hypoglycemia  - Administer ordered medications to maintain glucose within target range  - Assess nutritional intake and initiate nutrition service referral as needed  Outcome: Progressing     Problem: Prexisting or High Potential for Compromised Skin Integrity  Goal: Skin integrity is maintained or improved  Description: INTERVENTIONS:  - Identify patients at risk for skin breakdown  - Assess and monitor skin integrity  - Assess and monitor nutrition and hydration status  - Monitor labs   - Assess for incontinence   - Turn and reposition patient  - Assist with mobility/ambulation  - Relieve pressure over bony prominences  - Avoid friction and shearing  - Provide appropriate hygiene as needed including keeping  skin clean and dry  - Evaluate need for skin moisturizer/barrier cream  - Collaborate with interdisciplinary team   - Patient/family teaching  - Consider wound care consult   Outcome: Progressing

## 2024-04-04 NOTE — NURSING NOTE
Reviewed discharge paperwork with patient. All questions and concerns answered at this time. IV and masimo removed. Patient and belongings accompanied with RN and spouse via wheelchair to lobby for transport home.

## 2024-04-04 NOTE — RESTORATIVE TECHNICIAN NOTE
Restorative Technician Note      Patient Name: Georgie King     Restorative Tech Visit Date: 04/04/24  Note Type: Bracing, Follow-up fitting  Patient Position Upon Consult: Supine  Activity Performed: Stood  Assistive Device: Other (Comment) (Rollator)  Brace Applied: Darco Wedge Shoe (Toe Unloading)  Additional Brace Ordered: No  Patient Position When Brace Applied: Supine  Bracing Recommendations: None  Patient Position at End of Consult: Seated edge of bed; All needs within reach

## 2024-04-04 NOTE — CASE MANAGEMENT
Case Management Discharge Planning Note    Patient name Georgie King  Location [facility] 2 /[facility] 2 M* MRN [id number]  : 1965 Date 2024       Current Admission Date: 2024  Current Admission Diagnosis:PAD (peripheral artery disease) (Former[facility])   Patient Active Problem List    Diagnosis Date Noted    Diabetes mellitus type 2 with peripheral artery disease (HCC) 2024    HTN (hypertension) 2024    Acute ischemia of Right great toe 2024    Dyslipidemia 2024    Abnormal CT scan 2024    Morbid obesity (HCC) 2024    PAD (peripheral artery disease) ([facility]) 2024    Tobacco abuse 2024    Type 2 diabetes mellitus, without long-term current use of insulin ([facility]) 2024      LOS (days): 3  Geometric Mean LOS (GMLOS) (days):   Days to GMLOS:     OBJECTIVE:  Risk of Unplanned Readmission Score: 11.29         Current admission status: Inpatient   Preferred Pharmacy:   [facility] #[id number]  [address]  [address] [address]  Phone: [phone] Fax: [phone]    [id number]  [address] - [address] [place] [address]  Phone: [phone] Fax: [phone]    Primary Care Provider: Bassem Barton DO    Primary Insurance: BLUE CROSS  Secondary Insurance:     DISCHARGE DETAILS:       Requested Home Health Care         Is the patient interested in HHC at discharge?: Yes  Home Health Discipline requested:: Occupational Therapy, Physical Therapy  Home Health Agency Name:: Other  Home Health Follow-Up Provider:: PCP  Home Health Services Needed:: Gait/ADL Training, Evaluate Functional Status and Safety, Strengthening/Theraputic Exercises to Improve Function  Homebound Criteria Met:: Requires the Assistance of Another Person for Safe Ambulation or to Leave the Home  Supporting Clincal Findings:: Limited Endurance    DME Referral Provided  Referral made for DME?: Yes  DME referral completed  for the following items:: Wheelchair, Bedside Commode  DME Supplier Name:: [employer]    Other Referral/Resources/Interventions Provided:  Interventions: HHC    Treatment Team Recommendation: Home with Home Health Care  Discharge Destination Plan:: Home with Home Health Care       Additional Comments: PT OT recommending home health, bedside commode and a wheel chair. CM unable to reach patient, patient was discharged. CM made referrals for [facility] ehealth and ordered DME to be delivered to patient's home.

## 2024-04-04 NOTE — DISCHARGE SUMMARY
PODIATRY DISCHARGE SUMMARY     Patient Name: Georgie King   Age & Sex: 59 y.o. female   MRN: [id number]  Unit/Bed#: [address] 223[address]01   Encounter: [id number]  Length of Stay: 3 days    Active Problems:    PAD (peripheral artery disease) (HCC)    Type 2 diabetes mellitus, without long-term current use of insulin (HCC)      HPI from Admission 4/1/24:   Georgie King is a 59 y.o. female with pmh of PAD, Tobacco abuse,  who presents with gangrene of the right great toe progressing over the last 2 months. Patient reports purulent drainage from the toe this morning. She is tender to the touch and red around the big toe. Denies F/C/N/V  She had a previous planned surgery for 4/26/23 with Armando.      DETAILS OF HOSPITAL COURSE     Georgie King is a 59 y.o. female who was admitted on 4/1/2024 for gangrene of right great toe. Patient was seen and admitted through the ED, and cleared for surgical intervention from both cardiology and vascular surgery. Patient went to OR on 4/2 for Right hallux amputation, intra-operatively bleeding was adequate and closure obtained. Post-operative day 2 incision is stable and patient is stable for discharge home with close follow up outpatient.     DISCHARGE INFORMATION     PCP at Discharge: Bassem Barton DO    Admitting Provider: Dr. Roberts  Admission Date: 4/1/2024     Discharge Provider: Dr. Frazier  Discharge Date: 04/04/24    Discharge Disposition: Home  Discharge Condition: Good  Discharge with Lines: No  Discharge Diet: Diabetic  Activity Restrictions: WBAT in toe-offloading shoe.   Test Results Pending at Discharge: None  Medications at Discharge: See after visit summary for reconciled discharge medications provided to patient and family.      Discharge Diagnoses:  Active Problems:    PAD (peripheral artery disease) (HCC)    Type 2 diabetes mellitus, without long-term current use of insulin (HCC)      Consulting Providers:  Internal medicine and Vascular  "surgery    Diagnostic Imaging Performed:  XR foot right 3+ views    Result Date: 4/3/2024  Impression: Amputation of the great toe. Workstation performed: [id number]     XR toe great min 2 views RIGHT    Result Date: 4/1/2024  Impression: Noticeable interval change in the appearance of the soft tissues of the great toe which now appear either atrophied or debrided. The underlying bone appears intact. Workstation performed: [id number]       Procedures Performed:  Procedure(s):  AMPUTATION great TOE      Code Status: Level 1 - Full Code  Advance Directive and Living Will:      Power of :    POLST:      FOLLOW-UP     PCP Outpatient Follow-up:  Yes    Consulting Providers Follow-up:  Vascular surgery    Active Issues Requiring Follow-Up:  - PAD, Type 2 Diabetes mellitus     Discharge Statement:  I spent 25 minutes discharging the patient. This time was spent on the day of discharge. I had direct contact with the patient on the day of discharge. Additional documentation is required if more than 30 minutes were spent on discharge.       Portions of the record may have been created with voice recognition software.  Occasional wrong word or \"sound a like\" substitutions may have occurred due to the inherent limitations of voice recognition software.  Read the chart carefully and recognize, using context, where substitutions have occurred.  ==  Benjamín Stout DPM   [facility]  Podiatric Medicine & Surgery     "

## 2024-04-04 NOTE — PROGRESS NOTES
"Podiatry - Progress Note  Patient: Georgie King 59 y.o. female   MRN: [id number]  PCP: Bassem Barton DO  Unit/Bed#: [address] 223[address]01 Encounter: [id number]  Date Of Visit: 24    ASSESSMENT:    Georgie King is a 59 y.o. female with:    Gangrene of Right great toe.   - s/p R Hallux amputation (DOS 24)  Cellulitis Right great toe  PAD  - S/p agram 24  History of tobacco use.      PLAN:    POD2, dressings changed today, incision site stable with no acute clinical signs of infection. Patient stable for discharge later today.  Per vascular surgery, continue plavix and lipitor for medical management of PAD. No vascular surgical intervention planned at this time.   Pain is well controlled. Continue current pain management regimen.  Elevation on green foam wedges or pillows when non-ambulatory      Antibiotics started: Ancef (completed)  Pharmacologic VTE Prophylaxis: Sequential compression device (Venodyne)    Mechanical VTE Prophylaxis: sequential compression device   Weightbearing status: Non-weightbearing      Disposition:  Plan for discharge in the next -       SUBJECTIVE:     The patient was seen, evaluated, and assessed at bedside today. The patient was awake, alert, and in no acute distress. The patient reports minimal pain at this time. Pain is well controlled with current pain management regimen. Patient reports normal appetite and using the restroom postoperatively. Patient denies N/V/F/chills/SOB/CP.      OBJECTIVE:     Vitals:   /76   Pulse 77   Temp 97.6 °F (36.4 °C)   Resp 20   Ht 5' 1\" (1.549 m)   Wt 113 kg (249 lb 1.9 oz)   SpO2 96%   BMI 47.07 kg/m²     Temp (24hrs), Av.8 °F (36.6 °C), Min:97.4 °F (36.3 °C), Max:98.2 °F (36.8 °C)      Physical Exam:     General:  Alert, cooperative, and in no distress.  Lower Extremity:    Vascular:   DP: Right: 1+ Left: 1+  PT: Right: 1+ Left: 1+  CRT < 3 seconds at the digits. +0/4 edema noted at bilateral lower extremities. " "  Pedal hair is absent.   Skin temperature is WNL bilaterally.    Musculoskeletal:  S/p R hallux amputation    Dermatological:  LLE: incision site intact, capillary refill <3seconds, without signs of active infection: no purulence, no malodor, no ascending erythema, no crepitus, no fluctuance. Lateral distal aspect of incision partially dusky appearance but with adequate cap refill.      Neurological:  Gross sensation is diminished.   Light touch is diminished.   Protective sensation is diminished.        Additional Data:     Labs:    Results from last 7 days   Lab Units 04/01/24  1133   WBC Thousand/uL 11.57*   HEMOGLOBIN g/dL 10.9*   HEMATOCRIT % 35.2   PLATELETS Thousands/uL 432*   NEUTROS PCT % 64   LYMPHS PCT % 29   MONOS PCT % 5   EOS PCT % 1     Results from last 7 days   Lab Units 04/01/24  1133   POTASSIUM mmol/L 3.8   CHLORIDE mmol/L 108   CO2 mmol/L 22   BUN mg/dL 14   CREATININE mg/dL 0.76   CALCIUM mg/dL 9.4   ALK PHOS U/L 89   ALT U/L 11   AST U/L 9*     Results from last 7 days   Lab Units 04/01/24  1133   INR  1.63*       * I Have Reviewed All Lab Data Listed Above.    Recent Cultures (last 7 days):     Results from last 7 days   Lab Units 04/01/24  1142 04/01/24  1133   BLOOD CULTURE  No Growth at 48 hrs. No Growth at 48 hrs.           Imaging: I have personally reviewed pertinent films in PACS  EKG, Pathology, and Other Studies: I have personally reviewed pertinent reports.    ** Please Note: Portions of the record may have been created with voice recognition software. Occasional wrong word or \"sound a like\" substitutions may have occurred due to the inherent limitations of voice recognition software. Read the chart carefully and recognize, using context, where substitutions have occurred. **      "

## 2024-04-04 NOTE — TELEPHONE ENCOUNTER
Caller: Patient    Doctor: Jose Alfredo    Reason for call: Patient calling for 1 week post-op visit.  4/2/24 AMPUTATION great TOE (Right: Toe).  Doctors schedule full.  Please advise.  Would like AM appt if possible    Call back#: [phone]

## 2024-04-04 NOTE — PLAN OF CARE
Problem: Potential for Falls  Goal: Patient will remain free of falls  Description: INTERVENTIONS:  - Educate patient/family on patient safety including physical limitations  - Instruct patient to call for assistance with activity   - Consult OT/PT to assist with strengthening/mobility   - Keep Call bell within reach  - Keep bed low and locked with side rails adjusted as appropriate  - Keep care items and personal belongings within reach  - Initiate and maintain comfort rounds  - Make Fall Risk Sign visible to staff  - Offer Toileting every 2 Hours, in advance of need  - Initiate/Maintain bed alarm  - Obtain necessary fall risk management equipment: bed alarm  - Apply yellow socks and bracelet for high fall risk patients  - Consider moving patient to room near nurses station  Outcome: Progressing     Problem: PAIN - ADULT  Goal: Verbalizes/displays adequate comfort level or baseline comfort level  Description: Interventions:  - Encourage patient to monitor pain and request assistance  - Assess pain using appropriate pain scale  - Administer analgesics based on type and severity of pain and evaluate response  - Implement non-pharmacological measures as appropriate and evaluate response  - Consider cultural and social influences on pain and pain management  - Notify physician/advanced practitioner if interventions unsuccessful or patient reports new pain  Outcome: Progressing     Problem: INFECTION - ADULT  Goal: Absence or prevention of progression during hospitalization  Description: INTERVENTIONS:  - Assess and monitor for signs and symptoms of infection  - Monitor lab/diagnostic results  - Monitor all insertion sites, i.e. indwelling lines, tubes, and drains  - Monitor endotracheal if appropriate and nasal secretions for changes in amount and color  - [place] appropriate cooling/warming therapies per order  - Administer medications as ordered  - Instruct and encourage patient and family to use good hand  hygiene technique  - Identify and instruct in appropriate isolation precautions for identified infection/condition  Outcome: Progressing  Goal: Absence of fever/infection during neutropenic period  Description: INTERVENTIONS:  - Monitor WBC    Outcome: Progressing     Problem: SAFETY ADULT  Goal: Patient will remain free of falls  Description: INTERVENTIONS:  - Educate patient/family on patient safety including physical limitations  - Instruct patient to call for assistance with activity   - Consult OT/PT to assist with strengthening/mobility   - Keep Call bell within reach  - Keep bed low and locked with side rails adjusted as appropriate  - Keep care items and personal belongings within reach  - Initiate and maintain comfort rounds  - Make Fall Risk Sign visible to staff  - Offer Toileting every 2 Hours, in advance of need  - Initiate/Maintain bed alarm  - Obtain necessary fall risk management equipment: bed alarm  - Apply yellow socks and bracelet for high fall risk patients  - Consider moving patient to room near nurses station  Outcome: Progressing  Goal: Maintain or return to baseline ADL function  Description: INTERVENTIONS:  - Educate patient/family on patient safety including physical limitations  - Instruct patient to call for assistance with activity   - Consult OT/PT to assist with strengthening/mobility   - Keep Call bell within reach  - Keep bed low and locked with side rails adjusted as appropriate  - Keep care items and personal belongings within reach  - Initiate and maintain comfort rounds  - Make Fall Risk Sign visible to staff  - Offer Toileting every 2 Hours, in advance of need  - Initiate/Maintain bed alarm  - Obtain necessary fall risk management equipment: bed alarm  - Apply yellow socks and bracelet for high fall risk patients  - Consider moving patient to room near nurses station  Outcome: Progressing  Goal: Maintains/Returns to pre admission functional level  Description: INTERVENTIONS:  -  Perform AM-PAC 6 Click Basic Mobility/ Daily Activity assessment daily.  - Set and communicate daily mobility goal to care team and patient/family/caregiver.   - Collaborate with rehabilitation services on mobility goals if consulted  - Perform Range of Motion 2 times a day.  - Reposition patient every 2 hours.  - Dangle patient 2 times a day  - Stand patient 2 times a day  - Ambulate patient 2 times a day  - Out of bed to chair 2 times a day   - Out of bed for meals 2 times a day  - Out of bed for toileting  - Record patient progress and toleration of activity level   Outcome: Progressing     Problem: DISCHARGE PLANNING  Goal: Discharge to home or other facility with appropriate resources  Description: INTERVENTIONS:  - Identify barriers to discharge w/patient and caregiver  - Arrange for needed discharge resources and transportation as appropriate  - Identify discharge learning needs (meds, wound care, etc.)  - Arrange for interpretive services to assist at discharge as needed  - Refer to Case Management Department for coordinating discharge planning if the patient needs post-hospital services based on physician/advanced practitioner order or complex needs related to functional status, cognitive ability, or social support system  Outcome: Progressing     Problem: Knowledge Deficit  Goal: Patient/family/caregiver demonstrates understanding of disease process, treatment plan, medications, and discharge instructions  Description: Complete learning assessment and assess knowledge base.  Interventions:  - Provide teaching at level of understanding  - Provide teaching via preferred learning methods  Outcome: Progressing     Problem: SKIN/TISSUE INTEGRITY - ADULT  Goal: Skin Integrity remains intact(Skin Breakdown Prevention)  Description: Assess:  -Perform Marty assessment every shift  -Clean and moisturize skin every shift  -Inspect skin when repositioning, toileting, and assisting with ADLS  -Assess under medical  devices such as masimo every shift  -Assess extremities for adequate circulation and sensation     Bed Management:  -Have minimal linens on bed & keep smooth, unwrinkled  -Change linens as needed when moist or perspiring  -Avoid sitting or lying in one position for more than 2 hours while in bed  -Keep HOB at 30degrees     Toileting:  -Offer bedside commode  -Assess for incontinence every shift  -Use incontinent care products after each incontinent episode such as foam    Activity:  -Mobilize patient 3 times a day  -Encourage activity and walks on unit  -Encourage or provide ROM exercises   -Turn and reposition patient every 2 Hours  -Use appropriate equipment to lift or move patient in bed  -Instruct/ Assist with weight shifting every 2 when out of bed in chair  -Consider limitation of chair time 2 hour intervals    Skin Care:  -Avoid use of baby powder, tape, friction and shearing, hot water or constrictive clothing  -Relieve pressure over bony prominences using waffle cushion  -Do not massage red bony areas    Next Steps:  -Teach patient strategies to minimize risks such as skin breakdown   -Consider consults to  interdisciplinary teams such as nutrition  Outcome: Progressing  Goal: Incision(s), wounds(s) or drain site(s) healing without S/S of infection  Description: INTERVENTIONS  - Assess and document dressing, incision, wound bed, drain sites and surrounding tissue  - Provide patient and family education  - Perform skin care/dressing changes every day  Outcome: Progressing  Goal: Pressure injury heals and does not worsen  Description: Interventions:  - Implement low air loss mattress or specialty surface (Criteria met)  - Apply silicone foam dressing  - Instruct/assist with weight shifting every 120 minutes when in chair   - Limit chair time to 2 hour intervals  - Use special pressure reducing interventions such as wedges when in chair   - Apply fecal or urinary incontinence containment device   - Perform  passive or active ROM every day  - Turn and reposition patient & offload bony prominences every 2 hours   - Utilize friction reducing device or surface for transfers   - Consider consults to  interdisciplinary teams such as nutrition  - Use incontinent care products after each incontinent episode such as foam  - Consider nutrition services referral as needed  Outcome: Progressing     Problem: METABOLIC, FLUID AND ELECTROLYTES - ADULT  Goal: Electrolytes maintained within normal limits  Description: INTERVENTIONS:  - Monitor labs and assess patient for signs and symptoms of electrolyte imbalances  - Administer electrolyte replacement as ordered  - Monitor response to electrolyte replacements, including repeat lab results as appropriate  - Instruct patient on fluid and nutrition as appropriate  Outcome: Progressing  Goal: Fluid balance maintained  Description: INTERVENTIONS:  - Monitor labs   - Monitor I/O and WT  - Instruct patient on fluid and nutrition as appropriate  - Assess for signs & symptoms of volume excess or deficit  Outcome: Progressing  Goal: Glucose maintained within target range  Description: INTERVENTIONS:  - Monitor Blood Glucose as ordered  - Assess for signs and symptoms of hyperglycemia and hypoglycemia  - Administer ordered medications to maintain glucose within target range  - Assess nutritional intake and initiate nutrition service referral as needed  Outcome: Progressing

## 2024-04-05 LAB
DME PARACHUTE DELIVERY DATE ACTUAL: NORMAL
DME PARACHUTE DELIVERY DATE EXPECTED: NORMAL
DME PARACHUTE DELIVERY DATE REQUESTED: NORMAL
DME PARACHUTE ITEM DESCRIPTION: NORMAL
DME PARACHUTE ORDER STATUS: NORMAL
DME PARACHUTE SUPPLIER NAME: NORMAL
DME PARACHUTE SUPPLIER PHONE: NORMAL

## 2024-04-05 NOTE — UTILIZATION REVIEW
NOTIFICATION OF ADMISSION DISCHARGE   This is a Notification of Discharge from [facility]. Please be advised that this patient has been discharge from our facility. Below you will find the admission and discharge date and time including the patient’s disposition.   UTILIZATION REVIEW CONTACT:  Machelle Jurado  Utilization   Network Utilization Review Department  Phone: [phone] x carefully listen to the prompts. All voicemails are confidential.  Email: [email or web address]     ADMISSION INFORMATION  PRESENTATION DATE: 4/1/2024 10:50 AM  OBERVATION ADMISSION DATE:   INPATIENT ADMISSION DATE: 4/1/24 11:48 AM   DISCHARGE DATE: 4/4/2024 11:08 AM   DISPOSITION:Home/Self Care    Network Utilization Review Department  ATTENTION: Please call with any questions or concerns to [phone] and carefully listen to the prompts so that you are directed to the right person. All voicemails are confidential.   For Discharge needs, contact Care Management DC Support Team at [phone] opt. 2  Send all requests for admission clinical reviews, approved or denied determinations and any other requests to dedicated fax number below belonging to the campus where the patient is receiving treatment. List of dedicated fax numbers for the Facilities:  FACILITY NAME UR FAX NUMBER   ADMISSION DENIALS (Administrative/Medical Necessity) [phone]   DISCHARGE SUPPORT TEAM ([facility]) [phone]   PARENT CHILD HEALTH (Maternity/NICU/Pediatrics) [phone]   [facility] [phone]   [facility] [phone]   [facility] [phone]   [facility] [phone]   [facility] [phone]   [facility] [phone]   [facility] [phone]   [facility] [phone]   [facility]  [facility] [phone]   [facility] [phone]   [facility] [phone]   [facility] [phone]

## 2024-04-06 LAB
BACTERIA BLD CULT: NORMAL
BACTERIA BLD CULT: NORMAL

## 2024-04-08 ENCOUNTER — OFFICE VISIT (OUTPATIENT)
Dept: INTERNAL MEDICINE CLINIC | Facility: CLINIC | Age: 59
End: 2024-04-08
Payer: COMMERCIAL

## 2024-04-08 VITALS
WEIGHT: 249 LBS | SYSTOLIC BLOOD PRESSURE: 130 MMHG | DIASTOLIC BLOOD PRESSURE: 80 MMHG | OXYGEN SATURATION: 97 % | BODY MASS INDEX: 47.01 KG/M2 | RESPIRATION RATE: 18 BRPM | HEIGHT: 61 IN | HEART RATE: 111 BPM | TEMPERATURE: 98.1 F

## 2024-04-08 DIAGNOSIS — I96 GANGRENE OF TOE OF RIGHT FOOT (HCC): Primary | ICD-10-CM

## 2024-04-08 DIAGNOSIS — I73.9 PAD (PERIPHERAL ARTERY DISEASE) (HCC): ICD-10-CM

## 2024-04-08 PROCEDURE — 99496 TRANSJ CARE MGMT HIGH F2F 7D: CPT | Performed by: INTERNAL MEDICINE

## 2024-04-08 NOTE — PROGRESS NOTES
INTERNAL MEDICINE OFFICE VISIT  [facility]    NAME: Georgie King  AGE: 59 y.o. SEX: female    DATE OF ENCOUNTER: 4/8/2024    History of Present Illness/Assessment and Plan     Here today for TCM appointment.  Medical history of type 2 diabetes, hypertension, dyslipidemia, peripheral arterial disease, nicotine dependence     Patient admitted at [facility] from 4/1 to 4/4.  Admitted for gangrene of the right great toe.  She has known history of PAD with vascular compromise and history of gangrene of the right great toe.  She went to the OR on 4/2 for right hallux amputation.  She was cleared for discharge home    She has been doing well since discharge.  Right foot wrapped today and she is wearing boot.  She denies any issues with excess wound drainage.  Pain is tolerable    Plan:  Continue follow-up with podiatry, appointment scheduled for 4/10  Continue follow-up with vascular surgery appointment scheduled for 4/11  Pain control regimen with Tylenol, ibuprofen, Flexeril as needed          1. Gangrene of toe of right foot (HCC)    2. PAD (peripheral artery disease) (HCC)               No orders of the defined types were placed in this encounter.      Chief Complaint     Chief Complaint   Patient presents with   • Transition of Care Management       TCM Call     Date and time call was made  4/4/2024 11:50 AM    Patient was hospitialized at  [facility]    Date of Admission  04/01/24    Date of discharge  04/04/24    Diagnosis  TCM D/C4/4/2024 Gangrene of toe    Disposition  Home    Were the patients medications reviewed and updated  Yes    Current Symptoms  None      TCM Call     Post hospital issues  None    Should patient be enrolled in anticoag monitoring?  No    Scheduled for follow up?  Yes    Did you obtain your prescribed medications  Yes    Do you need help managing your prescriptions or medications  No    Is transportation to your appointment needed  No    I  "have advised the patient to call PCP with any new or worsening symptoms  ROBIN RAMOS (MA)             The following portions of the patient's history were reviewed and updated as appropriate: allergies, current medications, past family history, past medical history, past social history, past surgical history and problem list.    Review of Systems     10 point ROS negative except per HPI    Active Problem List     Patient Active Problem List   Diagnosis   • PAD (peripheral artery disease) (HCC)   • Tobacco abuse   • Type 2 diabetes mellitus, without long-term current use of insulin (HCC)   • Abnormal CT scan   • Morbid obesity (HCC)   • HTN (hypertension)   • Acute ischemia of Right great toe   • Dyslipidemia   • Diabetes mellitus type 2 with peripheral artery disease (HCC)       Objective     /80 (BP Location: Left arm, Patient Position: Sitting, Cuff Size: Standard)   Pulse (!) 111   Temp 98.1 °F (36.7 °C)   Resp 18   Ht 5' 1\" (1.549 m)   Wt 113 kg (249 lb)   SpO2 97%   BMI 47.05 kg/m²     Physical Exam    Pertinent Laboratory/Diagnostic Studies:  XR foot right 3+ views    Result Date: 4/3/2024  Impression: Amputation of the great toe. Workstation performed: [id number]     XR toe great min 2 views RIGHT    Result Date: 4/1/2024  Impression: Noticeable interval change in the appearance of the soft tissues of the great toe which now appear either atrophied or debrided. The underlying bone appears intact. Workstation performed: [id number]       Images and diagnostics reviewed     Current Medications     Current Outpatient Medications:   •  acetaminophen (TYLENOL) 325 mg tablet, Take 2 tablets (650 mg total) by mouth every 4 (four) hours as needed for mild pain for up to 30 doses, Disp: 30 tablet, Rfl: 0  •  Alcohol Swabs 70 % PADS, May substitute brand based on insurance coverage. Check glucose ACHS., Disp: 200 each, Rfl: 0  •  amLODIPine (NORVASC) 5 mg tablet, Take 1 tablet (5 mg total) by mouth " daily, Disp: 90 tablet, Rfl: 1  •  atorvastatin (LIPITOR) 40 mg tablet, Take 1 tablet (40 mg total) by mouth daily with dinner, Disp: 30 tablet, Rfl: 1  •  Blood Glucose Monitoring Suppl (OneTouch Verio Reflect) w/Device KIT, May substitute brand based on insurance coverage. Check glucose ACHS., Disp: 1 kit, Rfl: 0  •  clopidogrel (PLAVIX) 75 mg tablet, Take 1 tablet (75 mg total) by mouth daily, Disp: 30 tablet, Rfl: 1  •  cyclobenzaprine (FLEXERIL) 5 mg tablet, Take 1 tablet (5 mg total) by mouth 3 (three) times a day for 10 days, Disp: 30 tablet, Rfl: 0  •  glimepiride (AMARYL) 2 mg tablet, Take 1 tablet (2 mg total) by mouth daily with breakfast, Disp: 90 tablet, Rfl: 1  •  glucose blood (OneTouch Verio) test strip, May substitute brand based on insurance coverage. Check glucose ACHS., Disp: 200 each, Rfl: 0  •  ibuprofen (MOTRIN) 600 mg tablet, Take 1 tablet (600 mg total) by mouth every 6 (six) hours as needed for mild pain for up to 30 doses, Disp: 30 tablet, Rfl: 0  •  lisinopril (ZESTRIL) 10 mg tablet, Take 1 tablet (10 mg total) by mouth daily, Disp: 90 tablet, Rfl: 1  •  metFORMIN (GLUCOPHAGE-XR) 750 mg 24 hr tablet, Take 1 tablet (750 mg total) by mouth daily with breakfast, Disp: 90 tablet, Rfl: 1  •  OneTouch Delica Lancets 33G MISC, May substitute brand based on insurance coverage. Check glucose ACHS., Disp: 200 each, Rfl: 0  •  rivaroxaban (Xarelto) 20 mg tablet, Take 1 tablet (20 mg total) by mouth daily with breakfast, Disp: 30 tablet, Rfl: 1    Health Maintenance     Health Maintenance   Topic Date Due   • Hepatitis C Screening  Never done   • Kidney Health Evaluation: Albumin/Creatinine Ratio  Never done   • Pneumococcal Vaccine: Pediatrics (0 to 5 Years) and At-Risk Patients (6 to 64 Years) (1 of 2 - PCV) Never done   • DM Eye Exam  Never done   • HIV Screening  Never done   • Annual Physical  Never done   • DTaP,Tdap,and Td Vaccines (1 - Tdap) Never done   • Cervical Cancer Screening  Never  done   • Breast Cancer Screening: Mammogram  Never done   • Colorectal Cancer Screening  Never done   • Zoster Vaccine (1 of 2) Never done   • COVID-19 Vaccine (1 - 2023-24 season) Never done   • HEMOGLOBIN A1C  08/19/2024   • Depression Screening  03/11/2025   • Diabetic Foot Exam  03/18/2025   • Kidney Health Evaluation: GFR  04/01/2025   • Influenza Vaccine  Completed   • HIB Vaccine  Aged Out   • IPV Vaccine  Aged Out   • Hepatitis A Vaccine  Aged Out   • Meningococcal ACWY Vaccine  Aged Out   • HPV Vaccine  Aged Out     Immunization History   Administered Date(s) Administered   • INFLUENZA 10/25/2023       Bassem Barton D.O.  [facility] - [address]  [place] [address]  Office: [phone]  Fax: [phone]

## 2024-04-10 ENCOUNTER — OFFICE VISIT (OUTPATIENT)
Dept: PODIATRY | Facility: CLINIC | Age: 59
End: 2024-04-10

## 2024-04-10 ENCOUNTER — TELEPHONE (OUTPATIENT)
Age: 59
End: 2024-04-10

## 2024-04-10 VITALS
SYSTOLIC BLOOD PRESSURE: 151 MMHG | BODY MASS INDEX: 47.05 KG/M2 | HEIGHT: 61 IN | DIASTOLIC BLOOD PRESSURE: 99 MMHG | HEART RATE: 97 BPM | OXYGEN SATURATION: 99 %

## 2024-04-10 DIAGNOSIS — E11.51 DIABETES MELLITUS TYPE 2 WITH PERIPHERAL ARTERY DISEASE (HCC): ICD-10-CM

## 2024-04-10 DIAGNOSIS — I96 GANGRENE OF TOE OF RIGHT FOOT (HCC): ICD-10-CM

## 2024-04-10 DIAGNOSIS — G89.18 ACUTE POST-OPERATIVE PAIN: Primary | ICD-10-CM

## 2024-04-10 PROCEDURE — 99024 POSTOP FOLLOW-UP VISIT: CPT | Performed by: PODIATRIST

## 2024-04-10 RX ORDER — CEPHALEXIN 500 MG/1
500 CAPSULE ORAL EVERY 8 HOURS SCHEDULED
Qty: 21 CAPSULE | Refills: 0 | Status: SHIPPED | OUTPATIENT
Start: 2024-04-10 | End: 2024-04-17

## 2024-04-10 RX ORDER — OXYCODONE HYDROCHLORIDE AND ACETAMINOPHEN 5; 325 MG/1; MG/1
1 TABLET ORAL EVERY 8 HOURS PRN
Qty: 9 TABLET | Refills: 0 | Status: SHIPPED | OUTPATIENT
Start: 2024-04-10

## 2024-04-10 NOTE — TELEPHONE ENCOUNTER
[facility] to discuss the patient's recent admission and to see if the patient did complete a TCM appointment (patient did on 4/8/24) and also to see if the patient completed a post-surgery appointment (patient did on 4/10/24). RN case manager also wanted to let us know that [facility] is providing home care. Patient is set up for weekly podiatry visits. RN case manager also wanted to provide her contact information if Dr. Barton thought the patient would benefit from disease process education, medication education or any other case management needs. Vanessa can be reached at [phone]. Thank you.

## 2024-04-11 ENCOUNTER — OFFICE VISIT (OUTPATIENT)
Dept: VASCULAR SURGERY | Facility: CLINIC | Age: 59
End: 2024-04-11
Payer: COMMERCIAL

## 2024-04-11 VITALS
BODY MASS INDEX: 46.82 KG/M2 | SYSTOLIC BLOOD PRESSURE: 136 MMHG | DIASTOLIC BLOOD PRESSURE: 82 MMHG | OXYGEN SATURATION: 99 % | WEIGHT: 248 LBS | HEIGHT: 61 IN | HEART RATE: 97 BPM

## 2024-04-11 DIAGNOSIS — I73.9 PAD (PERIPHERAL ARTERY DISEASE) (HCC): ICD-10-CM

## 2024-04-11 PROCEDURE — 99214 OFFICE O/P EST MOD 30 MIN: CPT | Performed by: SURGERY

## 2024-04-11 RX ORDER — ATORVASTATIN CALCIUM 40 MG/1
40 TABLET, FILM COATED ORAL
Qty: 90 TABLET | Refills: 3 | Status: SHIPPED | OUTPATIENT
Start: 2024-04-11

## 2024-04-11 RX ORDER — CLOPIDOGREL BISULFATE 75 MG/1
75 TABLET ORAL DAILY
Qty: 90 TABLET | Refills: 3 | Status: SHIPPED | OUTPATIENT
Start: 2024-04-11

## 2024-04-11 NOTE — PROGRESS NOTES
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    Since her procedure patient had progression of ischemia of her right great toe.  She is status post right hallux amputation which was performed on 4/2/2024.    There appears to be slow healing of her wound but noted in her podiatrist note there is some dehiscence at the distal ends of the incision.  Patient may require a repeat right lower extremity angiogram however we will discuss with her podiatrist.  If there is concern for nonhealing or her healing plateaus we will then likely proceed with a right lower extremity angiogram.  If healing continues, we can then hold off and continue to monitor clinically       Diagnoses and all orders for this visit:    PAD (peripheral artery disease) ([facility])  -     clopidogrel (PLAVIX) 75 mg tablet; Take 1 tablet (75 mg total) by mouth daily  -     rivaroxaban (Xarelto) 20 mg tablet; Take 1 tablet (20 mg total) by mouth daily with breakfast  -     atorvastatin (LIPITOR) 40 mg tablet; Take 1 tablet (40 mg total) by mouth daily with dinner          Subjective:      Patient ID: Georgie King is a 59 y.o. female.    Patient is here today to recheck incisional wound of right great toe. Patient is s/p a right great toe amputation done 4/2/2024 by Podiatry. Patient had a SHONNA done 4/1/2024. Patient denies any pain in her right leg or right foot. Patient is taking Xarelto, Plavix and Atorvastatin. Patient is a former smoker.     Wound Check        The following portions of the patient's history were reviewed and updated as appropriate:  "allergies, current medications, past family history, past medical history, past social history, past surgical history, and problem list.    I have spent a total time of 30 minutes on 04/11/24 in caring for this patient including Diagnostic results, Prognosis, Risks and benefits of tx options, Instructions for management, Patient and family education, Importance of tx compliance, Risk factor reductions, Impressions, Counseling / Coordination of care, Documenting in the medical record, Reviewing / ordering tests, medicine, procedures  , and Obtaining or reviewing history  .    Review of Systems   Constitutional: Negative.    HENT: Negative.     Eyes: Negative.    Respiratory: Negative.     Cardiovascular: Negative.    Gastrointestinal: Negative.    Endocrine: Negative.    Genitourinary: Negative.    Musculoskeletal: Negative.    Skin:  Positive for wound.   Allergic/Immunologic: Negative.    Neurological: Negative.    Hematological: Negative.    Psychiatric/Behavioral: Negative.           Objective:      /82 (BP Location: Left arm, Patient Position: Sitting, Cuff Size: Large)   Pulse 97   Ht 5' 1\" (1.549 m)   Wt 112 kg (248 lb)   SpO2 99%   BMI 46.86 kg/m²          Physical Exam  Constitutional:       Appearance: Normal appearance.   HENT:      Head: Normocephalic.      Mouth/Throat:      Mouth: Mucous membranes are moist.      Pharynx: Oropharynx is clear.   Eyes:      Extraocular Movements: Extraocular movements intact.      Pupils: Pupils are equal, round, and reactive to light.   Cardiovascular:      Rate and Rhythm: Normal rate and regular rhythm.   Pulmonary:      Effort: Pulmonary effort is normal.   Abdominal:      General: Abdomen is flat.      Tenderness: There is no abdominal tenderness.   Musculoskeletal:      Cervical back: Normal range of motion.   Skin:     Comments: Right foot dressed, not taken down per patient request   Neurological:      General: No focal deficit present.      Mental " Status: She is alert and oriented to person, place, and time.   Psychiatric:         Mood and Affect: Mood normal.         Behavior: Behavior normal.

## 2024-04-11 NOTE — ASSESSMENT & PLAN NOTE
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    Since her procedure patient had progression of ischemia of her right great toe.  She is status post right hallux amputation which was performed on 4/2/2024.    There appears to be slow healing of her wound but noted in her podiatrist note there is some dehiscence at the distal ends of the incision.  Patient may require a repeat right lower extremity angiogram however we will discuss with her podiatrist.  If there is concern for nonhealing or her healing plateaus we will then likely proceed with a right lower extremity angiogram.  If healing continues, we can then hold off and continue to monitor clinically

## 2024-04-11 NOTE — PROGRESS NOTES
Assessment/Plan:     The patient's postoperative visit today is significant for a superficial dehiscence of the distal margins of the incision line status post right hallux amputation secondary to gangrene.  Proximal half of the incision line seems to be healing well.  There is some slight erythema along the distal margins of the incision line is well concerning for low-grade, localized cellulitis.  There is no active drainage no purulence.  There is no malodor.  The dehiscence site does seem fairly granular with some mixed fibrin deposition.    The postsurgical wound was cleansed with Hibiclens scrub and painted with Betadine.  A silver alginate dressing was then applied.  Out of an abundance of caution, I will start the patient on a 7-day course of cephalexin 500 mg 3 times a day.  The patient is also still noting some significant postoperative pain at the amputation site.  She is requesting pain medicine to assist with this.  I feel this is reasonable.  The [place] PDMP was checked and was noted to be consistent.  A prescription for Percocet 5/325 mg was sent to her pharmacy.  A total of 9 pills was ordered to be taken once every 8 hours as needed for severe postop pain only.    Continue guarded weightbearing in a Darco wedge shoe.  She will keep the dressing clean dry and intact until follow-up in 1 week.     Diagnoses and all orders for this visit:    Acute post-operative pain  -     oxyCODONE-acetaminophen (Percocet) 5-325 mg per tablet; Take 1 tablet by mouth every 8 (eight) hours as needed for severe pain for up to 9 doses Max Daily Amount: 3 tablets    Gangrene of toe of right foot (HCC)  Comments:  right great toe  Orders:  -     Ambulatory Referral to Podiatry  -     oxyCODONE-acetaminophen (Percocet) 5-325 mg per tablet; Take 1 tablet by mouth every 8 (eight) hours as needed for severe pain for up to 9 doses Max Daily Amount: 3 tablets  -     cephalexin (KEFLEX) 500 mg capsule; Take 1 capsule (500  mg total) by mouth every 8 (eight) hours for 7 days    Diabetes mellitus type 2 with peripheral artery disease (HCC)  -     oxyCODONE-acetaminophen (Percocet) 5-325 mg per tablet; Take 1 tablet by mouth every 8 (eight) hours as needed for severe pain for up to 9 doses Max Daily Amount: 3 tablets  -     cephalexin (KEFLEX) 500 mg capsule; Take 1 capsule (500 mg total) by mouth every 8 (eight) hours for 7 days          Subjective:     Patient ID: Georgie King is a 59 y.o. female.    The patient presents today for follow-up status post right great toe amputation secondary to gangrene.  The patient is noting some postoperative pain at the amputation site.  She states that it tends to come and go and shoots through.  However when it is present, it is fairly tender.  She is requesting pain medications to help with this.  Otherwise, she has been keeping the postsurgical dressing clean dry and intact.  She is ambulating right now with a Darco wedge shoe which she is tolerating very well.  Her  is present with her in the exam room today.        Review of Systems   Constitutional: Negative.    HENT: Negative.     Eyes: Negative.    Respiratory: Negative.     Cardiovascular: Negative.    Endocrine: Negative.    Musculoskeletal: Negative.    Neurological: Negative.    Hematological: Negative.    Psychiatric/Behavioral: Negative.           Objective:     Physical Exam  Constitutional:       Appearance: Normal appearance.   HENT:      Head: Normocephalic and atraumatic.      Nose: Nose normal.   Cardiovascular:      Pulses:           Dorsalis pedis pulses are 0 on the right side.        Posterior tibial pulses are 0 on the right side.   Pulmonary:      Effort: Pulmonary effort is normal.   Feet:      Right foot:      Skin integrity: Erythema present.      Comments: The patient's postoperative visit today is significant for a superficial dehiscence of the distal margins of the incision line status post right hallux  amputation secondary to gangrene.  Proximal half of the incision line seems to be healing well.  There is some slight erythema along the distal margins of the incision line is well concerning for low-grade, localized cellulitis.  There is no active drainage no purulence.  There is no malodor.  The dehiscence site does seem fairly granular with some mixed fibrin deposition.  Skin:     General: Skin is warm.      Capillary Refill: Capillary refill takes 2 to 3 seconds.   Neurological:      General: No focal deficit present.      Mental Status: She is alert and oriented to person, place, and time.   Psychiatric:         Mood and Affect: Mood normal.         Behavior: Behavior normal.         Thought Content: Thought content normal.

## 2024-04-15 ENCOUNTER — TELEPHONE (OUTPATIENT)
Dept: PODIATRY | Facility: CLINIC | Age: 59
End: 2024-04-15

## 2024-04-15 NOTE — TELEPHONE ENCOUNTER
Caller: Georgie Ajelviraida    Doctor and/or Office: Alberto Roberts DPM    #: [phone]    Escalation: Georgie is still on the surgery schedule for 4/26/24.  She had the surgery on 4/2/24 with Dr. Veliz. She went through the emergency room.

## 2024-04-18 ENCOUNTER — OFFICE VISIT (OUTPATIENT)
Dept: PODIATRY | Facility: CLINIC | Age: 59
End: 2024-04-18

## 2024-04-18 VITALS
HEART RATE: 100 BPM | OXYGEN SATURATION: 99 % | HEIGHT: 61 IN | BODY MASS INDEX: 46.86 KG/M2 | SYSTOLIC BLOOD PRESSURE: 126 MMHG | DIASTOLIC BLOOD PRESSURE: 85 MMHG

## 2024-04-18 DIAGNOSIS — I96 GANGRENE OF TOE OF RIGHT FOOT (HCC): ICD-10-CM

## 2024-04-18 DIAGNOSIS — Z98.890 POSTOPERATIVE STATE: Primary | ICD-10-CM

## 2024-04-18 DIAGNOSIS — E11.51 DIABETES MELLITUS TYPE 2 WITH PERIPHERAL ARTERY DISEASE (HCC): ICD-10-CM

## 2024-04-18 DIAGNOSIS — Z89.411 HISTORY OF AMPUTATION OF RIGHT GREAT TOE (HCC): ICD-10-CM

## 2024-04-18 PROCEDURE — 99024 POSTOP FOLLOW-UP VISIT: CPT | Performed by: PODIATRIST

## 2024-04-18 RX ORDER — CEPHALEXIN 500 MG/1
500 CAPSULE ORAL EVERY 8 HOURS SCHEDULED
Qty: 21 CAPSULE | Refills: 0 | Status: SHIPPED | OUTPATIENT
Start: 2024-04-18 | End: 2024-04-25 | Stop reason: SDUPTHER

## 2024-04-18 NOTE — PROGRESS NOTES
Assessment/Plan:     The patient's clinical examination today is significant for a healing superficial dehiscence site to the distal aspect of the right foot.  There is very mild periwound induration and erythema.  There is no active drainage nor purulence.    The wound site was cleansed with a Hibiclens scrub and then painted with Betadine.  It was redressed with silver alginate.  She will keep the dressing clean dry and intact until follow-up in 7 days.  Out of an abundance of caution, I will add an additional 7 days of cephalexin.  She is thus far tolerating it well.  Continue guarded weightbearing in a surgical shoe.     Diagnoses and all orders for this visit:    Postoperative state    Gangrene of toe of right foot (HCC)  -     cephalexin (KEFLEX) 500 mg capsule; Take 1 capsule (500 mg total) by mouth every 8 (eight) hours for 7 days    Diabetes mellitus type 2 with peripheral artery disease (HCC)  -     cephalexin (KEFLEX) 500 mg capsule; Take 1 capsule (500 mg total) by mouth every 8 (eight) hours for 7 days    History of amputation of right great toe (HCC)          Subjective:     Patient ID: Georgie King is a 59 y.o. female.    The patient presents today for follow-up of a superficial wound dehiscence site to the distal aspect of the incision line.  The patient is status post recent hallux amputation secondary to gangrene and osteomyelitis.  She just finished a course of cephalexin yesterday and tolerated it well.        Review of Systems   Constitutional: Negative.    HENT: Negative.     Eyes: Negative.    Respiratory: Negative.     Cardiovascular: Negative.    Endocrine: Negative.    Musculoskeletal: Negative.    Skin:  Positive for wound.   Neurological: Negative.    Hematological: Negative.    Psychiatric/Behavioral: Negative.           Objective:     Physical Exam  Constitutional:       Appearance: Normal appearance.   HENT:      Head: Normocephalic and atraumatic.      Nose: Nose normal.    Cardiovascular:      Pulses:           Dorsalis pedis pulses are 0 on the right side.        Posterior tibial pulses are 0 on the right side.   Pulmonary:      Effort: Pulmonary effort is normal.   Feet:      Right foot:      Skin integrity: Skin breakdown and erythema present. No warmth.      Comments: The patient's clinical examination today is significant for a healing superficial dehiscence site to the distal aspect of the right foot.  There is very mild periwound induration and erythema.  There is no active drainage nor purulence.  Skin:     General: Skin is warm.      Capillary Refill: Capillary refill takes less than 2 seconds.   Neurological:      General: No focal deficit present.      Mental Status: She is alert and oriented to person, place, and time.   Psychiatric:         Mood and Affect: Mood normal.         Behavior: Behavior normal.         Thought Content: Thought content normal.

## 2024-04-25 ENCOUNTER — OFFICE VISIT (OUTPATIENT)
Dept: PODIATRY | Facility: CLINIC | Age: 59
End: 2024-04-25

## 2024-04-25 VITALS
DIASTOLIC BLOOD PRESSURE: 90 MMHG | OXYGEN SATURATION: 100 % | BODY MASS INDEX: 46.86 KG/M2 | HEIGHT: 61 IN | HEART RATE: 91 BPM | SYSTOLIC BLOOD PRESSURE: 125 MMHG

## 2024-04-25 DIAGNOSIS — I96 GANGRENE OF TOE OF RIGHT FOOT (HCC): ICD-10-CM

## 2024-04-25 DIAGNOSIS — Z98.890 POSTOPERATIVE STATE: Primary | ICD-10-CM

## 2024-04-25 DIAGNOSIS — Z89.411 HISTORY OF AMPUTATION OF RIGHT GREAT TOE (HCC): ICD-10-CM

## 2024-04-25 DIAGNOSIS — E11.51 DIABETES MELLITUS TYPE 2 WITH PERIPHERAL ARTERY DISEASE (HCC): ICD-10-CM

## 2024-04-25 PROCEDURE — 99024 POSTOP FOLLOW-UP VISIT: CPT | Performed by: PODIATRIST

## 2024-04-25 RX ORDER — CEPHALEXIN 500 MG/1
500 CAPSULE ORAL EVERY 8 HOURS SCHEDULED
Qty: 21 CAPSULE | Refills: 0 | Status: SHIPPED | OUTPATIENT
Start: 2024-04-25 | End: 2024-05-02

## 2024-04-25 NOTE — PROGRESS NOTES
Assessment/Plan:     The patient's clinical examination today is significant for a stable superficial dehiscence site to the distal aspect of the right foot.  There is very mild periwound induration and erythema.  There is no active drainage nor purulence.  The wound is appear to be improved.  No significant tenderness palpation.     The wound site was cleansed with a Hibiclens scrub and then painted with Betadine.  It was redressed with silver alginate.  She will keep the dressing clean dry and intact until follow-up in 7 days.  Out of an abundance of caution, I will add an additional 7 days of cephalexin.  She is thus far tolerating it well.  Continue guarded weightbearing in a surgical shoe.        Diagnoses and all orders for this visit:    Postoperative state    Gangrene of toe of right foot (HCC)  -     cephalexin (KEFLEX) 500 mg capsule; Take 1 capsule (500 mg total) by mouth every 8 (eight) hours for 7 days    Diabetes mellitus type 2 with peripheral artery disease (HCC)  -     cephalexin (KEFLEX) 500 mg capsule; Take 1 capsule (500 mg total) by mouth every 8 (eight) hours for 7 days    History of amputation of right great toe (HCC)          Subjective:     Patient ID: Georgie King is a 59 y.o. female.    The patient presents today for follow-up of a superficial wound dehiscence site to the distal aspect of the incision line.  The patient is status post recent hallux amputation secondary to gangrene and osteomyelitis.  She has no new issues nor concerns today.                Review of Systems   Constitutional: Negative.    HENT: Negative.     Eyes: Negative.    Respiratory: Negative.     Cardiovascular: Negative.    Endocrine: Negative.    Musculoskeletal: Negative.    Neurological: Negative.    Hematological: Negative.    Psychiatric/Behavioral: Negative.           Objective:     Physical Exam  Constitutional:       Appearance: Normal appearance.   HENT:      Head: Normocephalic and atraumatic.      Nose:  Nose normal.   Cardiovascular:      Pulses:           Dorsalis pedis pulses are 0 on the right side.        Posterior tibial pulses are 0 on the right side.   Pulmonary:      Effort: Pulmonary effort is normal.   Feet:      Comments: The patient's clinical examination today is significant for a stable superficial dehiscence site to the distal aspect of the right foot.  There is very mild periwound induration and erythema.  There is no active drainage nor purulence.  The wound is appear to be improved.  No significant tenderness palpation.  Skin:     General: Skin is warm.      Capillary Refill: Capillary refill takes less than 2 seconds.   Neurological:      General: No focal deficit present.      Mental Status: She is alert and oriented to person, place, and time.   Psychiatric:         Mood and Affect: Mood normal.         Behavior: Behavior normal.         Thought Content: Thought content normal.

## 2024-05-02 ENCOUNTER — OFFICE VISIT (OUTPATIENT)
Dept: PODIATRY | Facility: CLINIC | Age: 59
End: 2024-05-02
Payer: COMMERCIAL

## 2024-05-02 VITALS
HEART RATE: 96 BPM | SYSTOLIC BLOOD PRESSURE: 119 MMHG | BODY MASS INDEX: 46.86 KG/M2 | DIASTOLIC BLOOD PRESSURE: 76 MMHG | HEIGHT: 61 IN | OXYGEN SATURATION: 98 %

## 2024-05-02 DIAGNOSIS — Z89.411 HISTORY OF AMPUTATION OF RIGHT GREAT TOE (HCC): ICD-10-CM

## 2024-05-02 DIAGNOSIS — G54.6 PHANTOM PAIN AFTER AMPUTATION OF LOWER EXTREMITY (HCC): ICD-10-CM

## 2024-05-02 DIAGNOSIS — Z98.890 POSTOPERATIVE STATE: Primary | ICD-10-CM

## 2024-05-02 DIAGNOSIS — E11.42 DIABETIC POLYNEUROPATHY ASSOCIATED WITH TYPE 2 DIABETES MELLITUS (HCC): ICD-10-CM

## 2024-05-02 PROCEDURE — 99213 OFFICE O/P EST LOW 20 MIN: CPT | Performed by: PODIATRIST

## 2024-05-02 RX ORDER — GABAPENTIN 100 MG/1
100 CAPSULE ORAL
Qty: 30 CAPSULE | Refills: 2 | Status: SHIPPED | OUTPATIENT
Start: 2024-05-02

## 2024-05-02 NOTE — PROGRESS NOTES
Assessment/Plan:     The patient's clinical examination today is significant for a stable superficial dehiscence site to the distal aspect of the right foot.  The periwound induration and erythema has resolved.  There is no active drainage nor purulence.  The wound is appear to be improved.  No significant tenderness palpation.  The remaining sutures were removed today.     The wound site was cleansed with a Hibiclens scrub and then painted with Betadine.  It was redressed with silver alginate.  She will keep the dressing clean dry and intact until follow-up in 7 days.  Out of an abundance of caution, I will add an additional 7 days of cephalexin.  She is thus far tolerating it well.  Continue guarded weightbearing in a surgical shoe.    I will start her on a trial of gabapentin 100 mg at bedtime for management of her phantom pains which seems to be worse at night.  We can titrate up as needed and tolerated.        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 female.    The patient presents today for follow-up of a superficial wound dehiscence site to the distal aspect of the incision line.  The patient is status post recent hallux amputation secondary to gangrene and osteomyelitis.  The patient still complains of phantom leg pains noting that she wakes up in the melanite with stabbing pain which feels like her great toe is still there.             Review of Systems   Constitutional: Negative.    HENT: Negative.     Eyes: Negative.    Respiratory:  Negative.     Cardiovascular: Negative.    Endocrine: Negative.    Musculoskeletal: Negative.    Neurological: Negative.    Hematological: Negative.    Psychiatric/Behavioral: Negative.           Objective:     Physical Exam  Constitutional:       Appearance: Normal appearance.   HENT:      Head: Normocephalic and atraumatic.      Nose: Nose normal.   Cardiovascular:      Pulses:           Dorsalis pedis pulses are 1+ on the right side.        Posterior tibial pulses are 0 on the right side.   Pulmonary:      Effort: Pulmonary effort is normal.   Feet:      Comments: The patient's clinical examination today is significant for a stable superficial dehiscence site to the distal aspect of the right foot.  The periwound induration and erythema has resolved.  There is no active drainage nor purulence.  The wound is appear to be improved.  No significant tenderness palpation.  The remaining sutures were removed today.  Skin:     General: Skin is warm.      Capillary Refill: Capillary refill takes 2 to 3 seconds.   Neurological:      General: No focal deficit present.      Mental Status: She is alert and oriented to person, place, and time.   Psychiatric:         Mood and Affect: Mood normal.         Behavior: Behavior normal.         Thought Content: Thought content normal.

## 2024-05-03 NOTE — ASSESSMENT & PLAN NOTE
59 year old female presented to our ED due to two weeks of right great toe pain and discoloration.   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

## 2024-05-09 ENCOUNTER — OFFICE VISIT (OUTPATIENT)
Dept: PODIATRY | Facility: CLINIC | Age: 59
End: 2024-05-09

## 2024-05-09 VITALS
HEIGHT: 61 IN | HEART RATE: 93 BPM | BODY MASS INDEX: 46.86 KG/M2 | DIASTOLIC BLOOD PRESSURE: 87 MMHG | SYSTOLIC BLOOD PRESSURE: 125 MMHG | OXYGEN SATURATION: 100 %

## 2024-05-09 DIAGNOSIS — E11.42 DIABETIC POLYNEUROPATHY ASSOCIATED WITH TYPE 2 DIABETES MELLITUS (HCC): ICD-10-CM

## 2024-05-09 DIAGNOSIS — Z98.890 POSTOPERATIVE STATE: Primary | ICD-10-CM

## 2024-05-09 DIAGNOSIS — Z89.411 HISTORY OF AMPUTATION OF RIGHT GREAT TOE (HCC): ICD-10-CM

## 2024-05-09 PROCEDURE — 99024 POSTOP FOLLOW-UP VISIT: CPT | Performed by: PODIATRIST

## 2024-05-09 NOTE — PROGRESS NOTES
Assessment/Plan:     The patient's clinical examination today is significant for a stable superficial dehiscence site to the distal aspect of the right foot.  The wound displays good interval improvement.  It is partial-thickness in nature without any signs of infection.     The wound site was cleansed with a Hibiclens scrub and then painted with Betadine.  It was redressed with silver alginate.  She we will continue dressings with Betadine paint and a dry dressing daily.  I will clear her to return to a comfortable shoe or sneaker.  She will need to monitor the wound site daily for signs of irritation.  If she does notice any, she will need to go back into her surgical shoe.        Diagnoses and all orders for this visit:    Postoperative state    History of amputation of right great toe (HCC)    Diabetic polyneuropathy associated with type 2 diabetes mellitus (HCC)          Subjective:     Patient ID: Georgie King is a 59 y.o. female.    The patient presents today for follow-up status post right hallux amputation secondary to osteomyelitis and gangrene.  She has been doing well since her last visit.  She feels that the wound is progressing nicely.  She does note that the surgical shoe is starting because lower back and hip pain.  She would like to attempt to transition back into regular shoe if possible.        Review of Systems   Constitutional: Negative.    HENT: Negative.     Eyes: Negative.    Respiratory: Negative.     Cardiovascular: Negative.    Endocrine: Negative.    Musculoskeletal: Negative.    Neurological: Negative.    Hematological: Negative.    Psychiatric/Behavioral: Negative.           Objective:     Physical Exam  Constitutional:       Appearance: Normal appearance.   HENT:      Head: Normocephalic and atraumatic.      Nose: Nose normal.   Cardiovascular:      Pulses:           Dorsalis pedis pulses are 0 on the right side.        Posterior tibial pulses are 0 on the right side.   Pulmonary:       Effort: Pulmonary effort is normal.   Musculoskeletal:      Right Lower Extremity: (History of right hallux amputation)  Feet:      Right foot:      Skin integrity: Skin breakdown present. No erythema or warmth.      Comments: The patient's clinical examination today is significant for a stable superficial dehiscence site to the distal aspect of the right foot.  The wound displays good interval improvement.  It is partial-thickness in nature without any signs of infection.  Skin:     General: Skin is warm.      Capillary Refill: Capillary refill takes 2 to 3 seconds.   Neurological:      General: No focal deficit present.      Mental Status: She is alert and oriented to person, place, and time.   Psychiatric:         Mood and Affect: Mood normal.         Behavior: Behavior normal.         Thought Content: Thought content normal.

## 2024-05-09 NOTE — LETTER
May 9, 2024     Patient: Georgie King  YOB: 1965  Date of Visit: 5/9/2024      To Whom it May Concern:    Georgie King is under my professional care. Georgie was seen in my office on 5/9/2024. Georgie is still healing from right foot surgery. She will not be able to return to work until further notice.    If you have any questions or concerns, please don't hesitate to call.         Sincerely,          Beni Veliz DPM        CC: No Recipients

## 2024-05-16 LAB
ALBUMIN SERPL-MCNC: 4 G/DL (ref 3.6–5.1)
ALBUMIN/GLOB SERPL: 1.4 (CALC) (ref 1–2.5)
ALP SERPL-CCNC: 93 U/L (ref 37–153)
ALT SERPL-CCNC: 11 U/L (ref 6–29)
AST SERPL-CCNC: 9 U/L (ref 10–35)
BILIRUB SERPL-MCNC: 0.3 MG/DL (ref 0.2–1.2)
BUN SERPL-MCNC: 13 MG/DL (ref 7–25)
BUN/CREAT SERPL: ABNORMAL (CALC) (ref 6–22)
CALCIUM SERPL-MCNC: 9.5 MG/DL (ref 8.6–10.4)
CHLORIDE SERPL-SCNC: 106 MMOL/L (ref 98–110)
CO2 SERPL-SCNC: 25 MMOL/L (ref 20–32)
CREAT SERPL-MCNC: 0.75 MG/DL (ref 0.5–1.05)
GFR/BSA.PRED SERPLBLD CYS-BASED-ARV: 91 ML/MIN/1.73M2
GLOBULIN SER CALC-MCNC: 2.9 G/DL (CALC) (ref 1.9–3.7)
GLUCOSE SERPL-MCNC: 106 MG/DL (ref 65–99)
POTASSIUM SERPL-SCNC: 5.3 MMOL/L (ref 3.5–5.3)
PROT SERPL-MCNC: 6.9 G/DL (ref 6.1–8.1)
SODIUM SERPL-SCNC: 139 MMOL/L (ref 135–146)

## 2024-05-20 ENCOUNTER — HOSPITAL ENCOUNTER (OUTPATIENT)
Dept: NON INVASIVE DIAGNOSTICS | Facility: CLINIC | Age: 59
Discharge: HOME | End: 2024-05-20
Payer: COMMERCIAL

## 2024-05-20 DIAGNOSIS — I73.9 PAD (PERIPHERAL ARTERY DISEASE) (HCC): ICD-10-CM

## 2024-05-20 DIAGNOSIS — I99.8 ISCHEMIA OF TOE: ICD-10-CM

## 2024-05-20 PROCEDURE — 93923 UPR/LXTR ART STDY 3+ LVLS: CPT

## 2024-05-20 PROCEDURE — 93978 VASCULAR STUDY: CPT

## 2024-05-21 PROCEDURE — 93978 VASCULAR STUDY: CPT | Performed by: SURGERY

## 2024-05-21 PROCEDURE — 93922 UPR/L XTREMITY ART 2 LEVELS: CPT | Performed by: SURGERY

## 2024-05-23 ENCOUNTER — OFFICE VISIT (OUTPATIENT)
Dept: PODIATRY | Facility: CLINIC | Age: 59
End: 2024-05-23

## 2024-05-23 VITALS
SYSTOLIC BLOOD PRESSURE: 126 MMHG | BODY MASS INDEX: 46.86 KG/M2 | HEART RATE: 90 BPM | HEIGHT: 61 IN | DIASTOLIC BLOOD PRESSURE: 90 MMHG | OXYGEN SATURATION: 99 %

## 2024-05-23 DIAGNOSIS — E11.42 DIABETIC POLYNEUROPATHY ASSOCIATED WITH TYPE 2 DIABETES MELLITUS (HCC): ICD-10-CM

## 2024-05-23 DIAGNOSIS — Z89.411 HISTORY OF AMPUTATION OF RIGHT GREAT TOE (HCC): ICD-10-CM

## 2024-05-23 DIAGNOSIS — I73.9 PERIPHERAL ARTERIAL DISEASE (HCC): ICD-10-CM

## 2024-05-23 DIAGNOSIS — Z98.890 POSTOPERATIVE STATE: Primary | ICD-10-CM

## 2024-05-23 PROCEDURE — 99024 POSTOP FOLLOW-UP VISIT: CPT | Performed by: PODIATRIST

## 2024-05-23 NOTE — PROGRESS NOTES
Assessment/Plan:     The patient's clinical examination today is significant for a essentially healed dehiscence site to the right great toe amputation site.  A small partial-thickness eschar has formed.  There are no signs of infection.     The wound site was painted with Betadine and covered with a dry Band-Aid.  She we will continue dressings with Betadine paint and a dry dressing daily.      Recommend follow-up in 2 weeks.       Diagnoses and all orders for this visit:    Postoperative state    History of amputation of right great toe (HCC)    Diabetic polyneuropathy associated with type 2 diabetes mellitus (HCC)    Peripheral arterial disease (HCC)          Subjective:     Patient ID: Georgie King is a 59 y.o. female.    The patient presents today for follow-up of a right hallux amputation wound dehiscence site.  She notes good interval improvement.  She denies any pain or discomfort to the right foot.  She has been applying Betadine paint and a dry dressing to the wound site as directed.        Review of Systems   Constitutional: Negative.    HENT: Negative.     Eyes: Negative.    Respiratory: Negative.     Cardiovascular: Negative.    Endocrine: Negative.    Musculoskeletal: Negative.    Neurological: Negative.    Hematological: Negative.    Psychiatric/Behavioral: Negative.           Objective:     Physical Exam  Constitutional:       Appearance: Normal appearance.   HENT:      Head: Normocephalic and atraumatic.      Nose: Nose normal.   Cardiovascular:      Pulses:           Dorsalis pedis pulses are 0 on the right side.        Posterior tibial pulses are 0 on the right side.   Pulmonary:      Effort: Pulmonary effort is normal.   Feet:      Comments: The patient's clinical examination today is significant for a essentially healed dehiscence site to the right great toe amputation site.  A small partial-thickness eschar has formed.  There are no signs of infection.     Skin:     General: Skin is warm.       Capillary Refill: Capillary refill takes 2 to 3 seconds.   Neurological:      General: No focal deficit present.      Mental Status: She is alert and oriented to person, place, and time.   Psychiatric:         Mood and Affect: Mood normal.         Behavior: Behavior normal.         Thought Content: Thought content normal.

## 2024-05-31 LAB
ALBUMIN SERPL-MCNC: 4.1 G/DL (ref 3.6–5.1)
ALBUMIN/CREAT UR: 11 MG/G CREAT
ALBUMIN/GLOB SERPL: 1.2 (CALC) (ref 1–2.5)
ALP SERPL-CCNC: 110 U/L (ref 37–153)
ALT SERPL-CCNC: 11 U/L (ref 6–29)
AST SERPL-CCNC: 10 U/L (ref 10–35)
BASOPHILS # BLD AUTO: 74 CELLS/UL (ref 0–200)
BASOPHILS NFR BLD AUTO: 0.6 %
BILIRUB SERPL-MCNC: 0.3 MG/DL (ref 0.2–1.2)
BUN SERPL-MCNC: 17 MG/DL (ref 7–25)
BUN/CREAT SERPL: ABNORMAL (CALC) (ref 6–22)
CALCIUM SERPL-MCNC: 9.7 MG/DL (ref 8.6–10.4)
CHLORIDE SERPL-SCNC: 106 MMOL/L (ref 98–110)
CHOLEST SERPL-MCNC: 112 MG/DL
CHOLEST/HDLC SERPL: 2.3 (CALC)
CO2 SERPL-SCNC: 24 MMOL/L (ref 20–32)
CREAT SERPL-MCNC: 0.77 MG/DL (ref 0.5–1.05)
CREAT UR-MCNC: 140 MG/DL (ref 20–275)
EOSINOPHIL # BLD AUTO: 172 CELLS/UL (ref 15–500)
EOSINOPHIL NFR BLD AUTO: 1.4 %
ERYTHROCYTE [DISTWIDTH] IN BLOOD BY AUTOMATED COUNT: 13.6 % (ref 11–15)
GFR/BSA.PRED SERPLBLD CYS-BASED-ARV: 88 ML/MIN/1.73M2
GLOBULIN SER CALC-MCNC: 3.3 G/DL (CALC) (ref 1.9–3.7)
GLUCOSE SERPL-MCNC: 132 MG/DL (ref 65–99)
HBA1C MFR BLD: 7.4 % OF TOTAL HGB
HCT VFR BLD AUTO: 35.6 % (ref 35–45)
HDLC SERPL-MCNC: 48 MG/DL
HGB BLD-MCNC: 11.3 G/DL (ref 11.7–15.5)
LDLC SERPL CALC-MCNC: 45 MG/DL (CALC)
LYMPHOCYTES # BLD AUTO: 3825 CELLS/UL (ref 850–3900)
LYMPHOCYTES NFR BLD AUTO: 31.1 %
MCH RBC QN AUTO: 25.9 PG (ref 27–33)
MCHC RBC AUTO-ENTMCNC: 31.7 G/DL (ref 32–36)
MCV RBC AUTO: 81.7 FL (ref 80–100)
MICROALBUMIN UR-MCNC: 1.5 MG/DL
MONOCYTES # BLD AUTO: 726 CELLS/UL (ref 200–950)
MONOCYTES NFR BLD AUTO: 5.9 %
NEUTROPHILS # BLD AUTO: 7503 CELLS/UL (ref 1500–7800)
NEUTROPHILS NFR BLD AUTO: 61 %
NONHDLC SERPL-MCNC: 64 MG/DL (CALC)
PLATELET # BLD AUTO: 501 THOUSAND/UL (ref 140–400)
PMV BLD REES-ECKER: 10.5 FL (ref 7.5–12.5)
POTASSIUM SERPL-SCNC: 5.2 MMOL/L (ref 3.5–5.3)
PROT SERPL-MCNC: 7.4 G/DL (ref 6.1–8.1)
RBC # BLD AUTO: 4.36 MILLION/UL (ref 3.8–5.1)
SODIUM SERPL-SCNC: 139 MMOL/L (ref 135–146)
TRIGL SERPL-MCNC: 100 MG/DL
WBC # BLD AUTO: 12.3 THOUSAND/UL (ref 3.8–10.8)

## 2024-06-04 ENCOUNTER — RA CDI HCC (OUTPATIENT)
Dept: OTHER | Facility: HOSPITAL | Age: 59
End: 2024-06-04

## 2024-06-07 ENCOUNTER — OFFICE VISIT (OUTPATIENT)
Dept: PODIATRY | Facility: CLINIC | Age: 59
End: 2024-06-07
Payer: COMMERCIAL

## 2024-06-07 VITALS
DIASTOLIC BLOOD PRESSURE: 101 MMHG | WEIGHT: 245 LBS | HEIGHT: 61 IN | HEART RATE: 96 BPM | BODY MASS INDEX: 46.26 KG/M2 | SYSTOLIC BLOOD PRESSURE: 154 MMHG | OXYGEN SATURATION: 98 %

## 2024-06-07 DIAGNOSIS — I73.9 PERIPHERAL ARTERIAL DISEASE (HCC): ICD-10-CM

## 2024-06-07 DIAGNOSIS — Z98.890 POSTOPERATIVE STATE: Primary | ICD-10-CM

## 2024-06-07 DIAGNOSIS — E11.42 DIABETIC POLYNEUROPATHY ASSOCIATED WITH TYPE 2 DIABETES MELLITUS (HCC): ICD-10-CM

## 2024-06-07 DIAGNOSIS — Z89.411 HISTORY OF AMPUTATION OF RIGHT GREAT TOE (HCC): ICD-10-CM

## 2024-06-07 PROCEDURE — 99213 OFFICE O/P EST LOW 20 MIN: CPT | Performed by: PODIATRIST

## 2024-06-07 NOTE — PROGRESS NOTES
Assessment/Plan:     The patient's clinical examination today is significant for a small partial thickness wound dehiscence site to the right great toe amputation site where a lytic eschar is noted, measuring about 2 mm x 1 mm x 1 mm in depth.  There are no signs of infection.     The wound site was sharply debrided of loose callus today with a sterile #15 blade.  The wound was cleansed with Betadine and covered with a dry Band-Aid.  She we will continue dressings with a dry dressing daily.       Recommend follow-up in 2-3 weeks.        Diagnoses and all orders for this visit:    Postoperative state    History of amputation of right great toe (HCC)    Diabetic polyneuropathy associated with type 2 diabetes mellitus (HCC)    Peripheral arterial disease (HCC)          Subjective:     Patient ID: Georgie King is a 59 y.o. female.    The patient presents today for follow-up of a right hallux amputation wound dehiscence site.  She notes good interval improvement.  She denies any pain or discomfort to the right foot.  She has been applying Betadine paint and a dry dressing to the wound site as directed.           PAST MEDICAL HISTORY:  Past Medical History:   Diagnosis Date    Acute ischemia of Right great toe 02/21/2024    Dyslipidemia 02/21/2024    PAD (peripheral artery disease) ([facility]) 02/19/2024    Tobacco abuse 02/19/2024    Type 2 diabetes mellitus, without long-term current use of insulin ([facility]) 02/19/2024       PAST SURGICAL HISTORY:  Past Surgical History:   Procedure Laterality Date    APPENDECTOMY      IR LOWER EXTREMITY ANGIOGRAM  2/21/2024    IR LOWER EXTREMITY ANGIOGRAM  2/21/2024    THROMBECTOMY W/ EMBOLECTOMY Right 2/21/2024    Procedure: EMBOLECTOMY/THROMBECTOMY LOWER EXTREMITY Right;  Surgeon: Linwood Hayes MD;  Location: [facility];  Service: Vascular    TOE AMPUTATION Right 4/2/2024    Procedure: AMPUTATION great TOE;  Surgeon: Beni Veliz DPM;  Location: [facility];  Service: Podiatry         ALLERGIES:  Patient has no known allergies.    MEDICATIONS:  Current Outpatient Medications   Medication Sig Dispense Refill    acetaminophen (TYLENOL) 325 mg tablet Take 2 tablets (650 mg total) by mouth every 4 (four) hours as needed for mild pain for up to 30 doses 30 tablet 0    Alcohol Swabs 70 % PADS May substitute brand based on insurance coverage. Check glucose ACHS. 200 each 0    amLODIPine (NORVASC) 5 mg tablet Take 1 tablet (5 mg total) by mouth daily 90 tablet 1    atorvastatin (LIPITOR) 40 mg tablet Take 1 tablet (40 mg total) by mouth daily with dinner 90 tablet 3    Blood Glucose Monitoring Suppl (OneTouch Verio Reflect) w/Device KIT May substitute brand based on insurance coverage. Check glucose ACHS. 1 kit 0    clopidogrel (PLAVIX) 75 mg tablet Take 1 tablet (75 mg total) by mouth daily 90 tablet 3    gabapentin (Neurontin) 100 mg capsule Take 1 capsule (100 mg total) by mouth daily at bedtime 30 capsule 2    glimepiride (AMARYL) 2 mg tablet Take 1 tablet (2 mg total) by mouth daily with breakfast 90 tablet 1    glucose blood (OneTouch Verio) test strip May substitute brand based on insurance coverage. Check glucose ACHS. 200 each 0    ibuprofen (MOTRIN) 600 mg tablet Take 1 tablet (600 mg total) by mouth every 6 (six) hours as needed for mild pain for up to 30 doses 30 tablet 0    lisinopril (ZESTRIL) 10 mg tablet Take 1 tablet (10 mg total) by mouth daily 90 tablet 1    metFORMIN (GLUCOPHAGE-XR) 750 mg 24 hr tablet Take 1 tablet (750 mg total) by mouth daily with breakfast 90 tablet 1    OneTouch Delica Lancets 33G MISC May substitute brand based on insurance coverage. Check glucose ACHS. 200 each 0    rivaroxaban (Xarelto) 20 mg tablet Take 1 tablet (20 mg total) by mouth daily with breakfast 90 tablet 3    cyclobenzaprine (FLEXERIL) 5 mg tablet Take 1 tablet (5 mg total) by mouth 3 (three) times a day for 10 days (Patient not taking: Reported on 5/9/2024) 30 tablet 0     oxyCODONE-acetaminophen (Percocet) 5-325 mg per tablet Take 1 tablet by mouth every 8 (eight) hours as needed for severe pain for up to 9 doses Max Daily Amount: 3 tablets (Patient not taking: Reported on 6/7/2024) 9 tablet 0     No current facility-administered medications for this visit.       SOCIAL HISTORY:  Social History     Socioeconomic History    Marital status: /Civil Union     Spouse name: None    Number of children: None    Years of education: None    Highest education level: None   Occupational History    None   Tobacco Use    Smoking status: Former     Current packs/day: 0.50     Types: Cigarettes    Smokeless tobacco: Never   Vaping Use    Vaping status: Never Used   Substance and Sexual Activity    Alcohol use: Not Currently     Comment: occ    Drug use: Never    Sexual activity: Yes     Partners: Male   Other Topics Concern    None   Social History Narrative    None     Social Determinants of Health     Financial Resource Strain: Not on file   Food Insecurity: No Food Insecurity (2/23/2024)    Hunger Vital Sign     Worried About Running Out of Food in the Last Year: Never true     Ran Out of Food in the Last Year: Never true   Transportation Needs: No Transportation Needs (2/23/2024)    PRAPARE - Transportation     Lack of Transportation (Medical): No     Lack of Transportation (Non-Medical): No   Physical Activity: Not on file   Stress: Not on file   Social Connections: Not on file   Intimate Partner Violence: Not on file   Housing Stability: Low Risk  (2/23/2024)    Housing Stability Vital Sign     Unable to Pay for Housing in the Last Year: No     Number of Places Lived in the Last Year: 1     Unstable Housing in the Last Year: No        Review of Systems   Constitutional: Negative.    HENT: Negative.     Eyes: Negative.    Respiratory: Negative.     Cardiovascular: Negative.    Endocrine: Negative.    Musculoskeletal: Negative.    Neurological: Negative.    Hematological: Negative.     Psychiatric/Behavioral: Negative.           Objective:     Physical Exam  Constitutional:       Appearance: Normal appearance.   HENT:      Head: Normocephalic and atraumatic.      Nose: Nose normal.   Pulmonary:      Effort: Pulmonary effort is normal.   Skin:     General: Skin is warm.      Capillary Refill: Capillary refill takes less than 2 seconds.   Neurological:      General: No focal deficit present.      Mental Status: She is alert and oriented to person, place, and time.   Psychiatric:         Mood and Affect: Mood normal.         Behavior: Behavior normal.         Thought Content: Thought content normal.

## 2024-06-10 ENCOUNTER — OFFICE VISIT (OUTPATIENT)
Dept: INTERNAL MEDICINE CLINIC | Facility: CLINIC | Age: 59
End: 2024-06-10
Payer: COMMERCIAL

## 2024-06-10 VITALS
OXYGEN SATURATION: 98 % | TEMPERATURE: 97.5 F | HEART RATE: 100 BPM | RESPIRATION RATE: 18 BRPM | DIASTOLIC BLOOD PRESSURE: 82 MMHG | SYSTOLIC BLOOD PRESSURE: 140 MMHG | WEIGHT: 247 LBS | HEIGHT: 61 IN | BODY MASS INDEX: 46.63 KG/M2

## 2024-06-10 DIAGNOSIS — E78.5 DYSLIPIDEMIA: ICD-10-CM

## 2024-06-10 DIAGNOSIS — D64.9 ANEMIA, UNSPECIFIED TYPE: ICD-10-CM

## 2024-06-10 DIAGNOSIS — Z12.11 SCREENING FOR COLORECTAL CANCER: ICD-10-CM

## 2024-06-10 DIAGNOSIS — Z12.12 SCREENING FOR COLORECTAL CANCER: ICD-10-CM

## 2024-06-10 DIAGNOSIS — I10 PRIMARY HYPERTENSION: ICD-10-CM

## 2024-06-10 DIAGNOSIS — E11.51 DIABETES MELLITUS TYPE 2 WITH PERIPHERAL ARTERY DISEASE (HCC): Primary | ICD-10-CM

## 2024-06-10 DIAGNOSIS — E11.9 TYPE 2 DIABETES MELLITUS, WITHOUT LONG-TERM CURRENT USE OF INSULIN (HCC): ICD-10-CM

## 2024-06-10 DIAGNOSIS — I73.9 PAD (PERIPHERAL ARTERY DISEASE) (HCC): ICD-10-CM

## 2024-06-10 PROCEDURE — 99214 OFFICE O/P EST MOD 30 MIN: CPT | Performed by: INTERNAL MEDICINE

## 2024-06-10 RX ORDER — ACYCLOVIR 400 MG/1
1 TABLET ORAL ONCE
Qty: 1 EACH | Refills: 0 | Status: SHIPPED | OUTPATIENT
Start: 2024-06-10 | End: 2024-06-10

## 2024-06-10 RX ORDER — ACYCLOVIR 400 MG/1
1 TABLET ORAL
Qty: 9 EACH | Refills: 3 | Status: SHIPPED | OUTPATIENT
Start: 2024-06-10

## 2024-06-10 RX ORDER — METFORMIN HYDROCHLORIDE 750 MG/1
750 TABLET, EXTENDED RELEASE ORAL 2 TIMES DAILY WITH MEALS
Qty: 180 TABLET | Refills: 1 | Status: SHIPPED | OUTPATIENT
Start: 2024-06-10

## 2024-06-10 NOTE — ASSESSMENT & PLAN NOTE
HbA1c: 7.4 May 2024, 12.3 February 2024  Current medications: Metformin, glimepiride  Statin: Yes  Albuminuria/ACEi/ARB: No significant microalbuminuria  Foot exam: Up-to-date  Retinopathy: Due for eye exam     Excellent improvement in A1c, near goal  Increase metformin XR to 750 mg 2 times daily with meals  Continue glimepiride  Recommend diabetic eye exam for retinopathy screening.  Patient would like to have everything wrapped up from a wound healing standpoint with her right foot before pursuing any eye exam or additional testing    Patient has been checking her blood glucose up to 4 times a day at home with fingersticks.  We reviewed that with her current degree of glycemic control this is not necessary.  She wishes to continue with blood glucose checks at home and I advised her that she can just check once or twice a day and that would be fine  -She requests order for DEXCOM which was placed today

## 2024-06-10 NOTE — PROGRESS NOTES
INTERNAL MEDICINE OFFICE VISIT  [facility] Internal Medicine- [place]    NAME: Georgie King  AGE: 59 y.o. SEX: female    DATE OF ENCOUNTER: 6/10/2024    Assessment and Plan/History of Present Illness     Here today for follow-up  Medical history of type 2 diabetes, hypertension, dyslipidemia, peripheral arterial disease, nicotine dependence     Patient defers mammogram for today.  Will readdress at follow-up visit    1. Diabetes mellitus type 2 with peripheral artery disease (HCC)  Assessment & Plan:  HbA1c: 7.4 May 2024, 12.3 February 2024  Current medications: Metformin, glimepiride  Statin: Yes  Albuminuria/ACEi/ARB: No significant microalbuminuria  Foot exam: Up-to-date  Retinopathy: Due for eye exam     Excellent improvement in A1c, near goal  Increase metformin XR to 750 mg 2 times daily with meals  Continue glimepiride  Recommend diabetic eye exam for retinopathy screening.  Patient would like to have everything wrapped up from a wound healing standpoint with her right foot before pursuing any eye exam or additional testing    Patient has been checking her blood glucose up to 4 times a day at home with fingersticks.  We reviewed that with her current degree of glycemic control this is not necessary.  She wishes to continue with blood glucose checks at home and I advised her that she can just check once or twice a day and that would be fine  -She requests order for DEXCOM which was placed today  Orders:  -     Continuous Glucose Sensor (Dexcom G7 Sensor); Use 1 Device every 10 days  -     Continuous Glucose  (Dexcom G7 ) COSMO; Use 1 each 1 (one) time for 1 dose  -     Hemoglobin A1C; Future; Expected date: 09/10/2024  -     Comprehensive metabolic panel; Future; Expected date: 09/10/2024  2. Screening for colorectal cancer  3. Type 2 diabetes mellitus, without long-term current use of insulin (HCC)  -     metFORMIN (GLUCOPHAGE-XR) 750 mg 24 hr tablet; Take 1 tablet (750 mg total) by mouth 2  "(two) times a day with meals  4. Anemia, unspecified type  -     CBC and differential; Future  -     Iron Panel (Includes Ferritin, Iron Sat%, Iron, and TIBC); Future  5. PAD (peripheral artery disease) ([facility])  Assessment & Plan:  Admission February 2024 for distal embolism of the right toe, necrotic appearing right toe.  Status post EVAR/right lower extremity thrombectomy/right SFA stent placement with angioplasty PT/AT  -Status post right hallux amputation April 2024  -Continue follow-up with vascular surgery, podiatry  -Continue clopidogrel, Xarelto, atorvastatin  -Attention to vascular risk factors  6. Primary hypertension  Assessment & Plan:  Relatively well-controlled  Continue amlodipine, lisinopril at current doses for now  7. Dyslipidemia  Assessment & Plan:  Well-controlled  Continue atorvastatin             Orders Placed This Encounter   Procedures   • Hemoglobin A1C   • Comprehensive metabolic panel   • CBC and differential       Chief Complaint     Chief Complaint   Patient presents with   • Annual Exam     3 month Pt refused pneumo zoster tdap rsv       Review of Systems     10 point ROS negative except per HPI    The following portions of the patient's history were reviewed and updated as appropriate: allergies, current medications, past family history, past medical history, past social history, past surgical history and problem list.    Objective     /82 (BP Location: Left arm, Patient Position: Sitting, Cuff Size: Standard)   Pulse 100   Temp 97.5 °F (36.4 °C)   Resp 18   Ht 5' 1\" (1.549 m)   Wt 112 kg (247 lb)   SpO2 98%   BMI 46.67 kg/m²     Physical Exam  Cardiovascular:      Rate and Rhythm: Normal rate and regular rhythm.      Heart sounds: Normal heart sounds. No murmur heard.  Pulmonary:      Effort: Pulmonary effort is normal.      Breath sounds: Normal breath sounds. No wheezing or rales.           Current Medications     Current Outpatient Medications:   •  Alcohol Swabs 70 % " PADS, May substitute brand based on insurance coverage. Check glucose ACHS., Disp: 200 each, Rfl: 0  •  amLODIPine (NORVASC) 5 mg tablet, Take 1 tablet (5 mg total) by mouth daily, Disp: 90 tablet, Rfl: 1  •  atorvastatin (LIPITOR) 40 mg tablet, Take 1 tablet (40 mg total) by mouth daily with dinner, Disp: 90 tablet, Rfl: 3  •  Blood Glucose Monitoring Suppl (OneTouch Verio Reflect) w/Device KIT, May substitute brand based on insurance coverage. Check glucose ACHS., Disp: 1 kit, Rfl: 0  •  clopidogrel (PLAVIX) 75 mg tablet, Take 1 tablet (75 mg total) by mouth daily, Disp: 90 tablet, Rfl: 3  •  Continuous Glucose  (Dexcom G7 ) COSMO, Use 1 each 1 (one) time for 1 dose, Disp: 1 each, Rfl: 0  •  Continuous Glucose Sensor (Dexcom G7 Sensor), Use 1 Device every 10 days, Disp: 9 each, Rfl: 3  •  gabapentin (Neurontin) 100 mg capsule, Take 1 capsule (100 mg total) by mouth daily at bedtime, Disp: 30 capsule, Rfl: 2  •  glimepiride (AMARYL) 2 mg tablet, Take 1 tablet (2 mg total) by mouth daily with breakfast, Disp: 90 tablet, Rfl: 1  •  glucose blood (OneTouch Verio) test strip, May substitute brand based on insurance coverage. Check glucose ACHS., Disp: 200 each, Rfl: 0  •  lisinopril (ZESTRIL) 10 mg tablet, Take 1 tablet (10 mg total) by mouth daily, Disp: 90 tablet, Rfl: 1  •  metFORMIN (GLUCOPHAGE-XR) 750 mg 24 hr tablet, Take 1 tablet (750 mg total) by mouth 2 (two) times a day with meals, Disp: 180 tablet, Rfl: 1  •  OneTouch Delica Lancets 33G MISC, May substitute brand based on insurance coverage. Check glucose ACHS., Disp: 200 each, Rfl: 0  •  rivaroxaban (Xarelto) 20 mg tablet, Take 1 tablet (20 mg total) by mouth daily with breakfast, Disp: 90 tablet, Rfl: 3  •  acetaminophen (TYLENOL) 325 mg tablet, Take 2 tablets (650 mg total) by mouth every 4 (four) hours as needed for mild pain for up to 30 doses (Patient not taking: Reported on 6/10/2024), Disp: 30 tablet, Rfl: 0  •  ibuprofen (MOTRIN) 600  mg tablet, Take 1 tablet (600 mg total) by mouth every 6 (six) hours as needed for mild pain for up to 30 doses (Patient not taking: Reported on 6/10/2024), Disp: 30 tablet, Rfl: 0      Bassem Barton D.O.  [facility] Internal Medicine [address]  [place] [address]  Office: [phone]  Fax: [phone]

## 2024-06-10 NOTE — ASSESSMENT & PLAN NOTE
Admission February 2024 for distal embolism of the right toe, necrotic appearing right toe.  Status post EVAR/right lower extremity thrombectomy/right SFA stent placement with angioplasty PT/AT  -Status post right hallux amputation April 2024  -Continue follow-up with vascular surgery, podiatry  -Continue clopidogrel, Xarelto, atorvastatin  -Attention to vascular risk factors

## 2024-06-12 ENCOUNTER — TELEPHONE (OUTPATIENT)
Dept: INTERNAL MEDICINE CLINIC | Facility: CLINIC | Age: 59
End: 2024-06-12

## 2024-06-12 NOTE — TELEPHONE ENCOUNTER
Received a fax needing a Prior Auth from [employer] for a Dexcom G7  and a Dexcom G7 Sensor.    KEY:[id number]  KEY:[id number]    Please initiated Prior Authorization.

## 2024-06-13 ENCOUNTER — OFFICE VISIT (OUTPATIENT)
Dept: VASCULAR SURGERY | Facility: CLINIC | Age: 59
End: 2024-06-13
Payer: COMMERCIAL

## 2024-06-13 VITALS
WEIGHT: 246 LBS | HEART RATE: 97 BPM | SYSTOLIC BLOOD PRESSURE: 128 MMHG | HEIGHT: 61 IN | OXYGEN SATURATION: 98 % | DIASTOLIC BLOOD PRESSURE: 70 MMHG | BODY MASS INDEX: 46.44 KG/M2

## 2024-06-13 DIAGNOSIS — I73.9 PAD (PERIPHERAL ARTERY DISEASE) (HCC): Primary | ICD-10-CM

## 2024-06-13 DIAGNOSIS — I74.09 AORTOILIAC OCCLUSIVE DISEASE (HCC): ICD-10-CM

## 2024-06-13 PROCEDURE — 99214 OFFICE O/P EST MOD 30 MIN: CPT | Performed by: SURGERY

## 2024-06-13 NOTE — ASSESSMENT & PLAN NOTE
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    Patient has since been doing well and has fully healed her right hallux amputation wound.  She has no lower extremity complaints.    Aortic duplex was reviewed which shows patency of the endograft.    Will plan for repeat duplex in 6 months.  Continue with her current medications which include Plavix, Xarelto, Lipitor.  Will have her follow-up after her duplex

## 2024-06-13 NOTE — ASSESSMENT & PLAN NOTE
Patient peripheral arterial disease continues to be monitored with surveillance duplex.  Will plan for a duplex in 6 months and follow-up at that time.

## 2024-06-13 NOTE — PROGRESS NOTES
Ambulatory Visit  Name: Georgie King      : 1965      MRN: [id number]  Encounter Provider: Linwood Hayes MD  Encounter Date: 2024   Enc[facility] [place]    Assessment & Plan   1. PAD (peripheral artery disease) (HCC)  Assessment & Plan:  Patient peripheral arterial disease continues to be monitored with surveillance duplex.  Will plan for a duplex in 6 months and follow-up at that time.  Orders:  -     VAS ABDOMINAL AORTA/ILIAC DUPLEX LIMITED; Future; Expected date: 2024  -     VAS ARTERIAL DUPLEX- LOWER LIMB BILATERAL; Future; Expected date: 2024  2. 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    Patient has since been doing well and has fully healed her right hallux amputation wound.  She has no lower extremity complaints.    Aortic duplex was reviewed which shows patency of the endograft.    Will plan for repeat duplex in 6 months.  Continue with her current medications which include Plavix, Xarelto, Lipitor.  Will have her follow-up after her duplex      History of Present Illness     Georgie King is a 59 y.o. female     Patient presents for review of AOIL + EVELIA's done 24. She is s/p right SFA stenting done 24. She denies pain with walking or new non healing wounds. She reports her right amp site is healing. She is taking Atorvastatin, Plavix, and Xarelto.     Review of Systems   Constitutional: Negative.    HENT: Negative.     Eyes: Negative.    Respiratory: Negative.     Cardiovascular: Negative.    Gastrointestinal: Negative.   "  Endocrine: Negative.    Genitourinary: Negative.    Musculoskeletal: Negative.    Skin: Negative.    Allergic/Immunologic: Negative.    Neurological: Negative.    Hematological:  Bruises/bleeds easily.   Psychiatric/Behavioral: Negative.       Medical History Reviewed by provider this encounter:  Tobacco  Allergies  Meds  Problems  Med Hx  Surg Hx  Fam Hx       Objective     /70 (BP Location: Left arm, Patient Position: Sitting, Cuff Size: Large)   Pulse 97   Ht 5' 1\" (1.549 m)   Wt 112 kg (246 lb)   SpO2 98%   BMI 46.48 kg/m²     Physical Exam  Vitals and nursing note reviewed.   Constitutional:       General: She is not in acute distress.     Appearance: She is well-developed.   HENT:      Head: Normocephalic and atraumatic.   Eyes:      Conjunctiva/sclera: Conjunctivae normal.   Cardiovascular:      Rate and Rhythm: Normal rate and regular rhythm.   Pulmonary:      Effort: Pulmonary effort is normal. No respiratory distress.      Breath sounds: Normal breath sounds.   Abdominal:      Palpations: Abdomen is soft.      Tenderness: There is no abdominal tenderness.   Musculoskeletal:      Cervical back: Neck supple.   Skin:     General: Skin is warm and dry.      Capillary Refill: Capillary refill takes less than 2 seconds.      Comments: Right hallux amputation wound healed   Neurological:      Mental Status: She is alert.   Psychiatric:         Mood and Affect: Mood normal.       Administrative Statements   I have spent a total time of 35 minutes on 06/13/24 In caring for this patient including Diagnostic results, Prognosis, Risks and benefits of tx options, Instructions for management, Patient and family education, Importance of tx compliance, Risk factor reductions, Impressions, Counseling / Coordination of care, Documenting in the medical record, Reviewing / ordering tests, medicine, procedures  , and Obtaining or reviewing history  .        "

## 2024-06-18 NOTE — TELEPHONE ENCOUNTER
PA for Dexcom G7  device    Submitted via    [x]CMM-KEY [id number]  [][employer]-Case ID #   []Faxed to plan   []Other website   []Phone call Case ID #     Office notes sent, clinical questions answered. Awaiting determination    Turnaround time for your insurance to make a decision on your Prior Authorization can take 7-21 business days.

## 2024-06-18 NOTE — TELEPHONE ENCOUNTER
PA for Dexcom G7 Sensor     Submitted via    []CMM-KEY   [x][employer]-Case ID #   []Faxed to plan   []Other website   []Phone call Case ID #     Office notes sent, clinical questions answered. Awaiting determination    Turnaround time for your insurance to make a decision on your Prior Authorization can take 7-21 business days.

## 2024-06-19 NOTE — TELEPHONE ENCOUNTER
PA for Dexcom G7  device/SENSOR  Approved     Date(s) approved 06/18/2024-06/18/2025    Case #    Patient advised by          [x] [employer] Message  [] Phone call   []LMOM  []L/M to call office as no active Communication consent on file  []Unable to leave detailed message as VM not approved on Communication consent       Pharmacy advised by    [x]Fax  []Phone call    Approval letter scanned into Media Yes

## 2024-06-27 ENCOUNTER — OFFICE VISIT (OUTPATIENT)
Dept: PODIATRY | Facility: CLINIC | Age: 59
End: 2024-06-27
Payer: COMMERCIAL

## 2024-06-27 VITALS
OXYGEN SATURATION: 98 % | HEIGHT: 61 IN | BODY MASS INDEX: 46.44 KG/M2 | DIASTOLIC BLOOD PRESSURE: 90 MMHG | HEART RATE: 94 BPM | SYSTOLIC BLOOD PRESSURE: 135 MMHG | WEIGHT: 246 LBS

## 2024-06-27 DIAGNOSIS — I73.9 PERIPHERAL ARTERIAL DISEASE (HCC): ICD-10-CM

## 2024-06-27 DIAGNOSIS — E11.42 DIABETIC POLYNEUROPATHY ASSOCIATED WITH TYPE 2 DIABETES MELLITUS (HCC): ICD-10-CM

## 2024-06-27 DIAGNOSIS — Z89.411 HISTORY OF AMPUTATION OF RIGHT GREAT TOE (HCC): Primary | ICD-10-CM

## 2024-06-27 DIAGNOSIS — G54.6 PHANTOM PAIN AFTER AMPUTATION OF LOWER EXTREMITY (HCC): ICD-10-CM

## 2024-06-27 PROCEDURE — 99213 OFFICE O/P EST LOW 20 MIN: CPT | Performed by: PODIATRIST

## 2024-06-27 NOTE — LETTER
June 27, 2024     Patient: Georgie King  YOB: 1965  Date of Visit: 6/27/2024      To Whom it May Concern:    Georgie King is under my professional care. Georgie was seen in my office on 6/27/2024. Georgie may return to work with limitations on 7/1/24: she will need to be limited to no more than 3 hours per day for the first month back .    If you have any questions or concerns, please don't hesitate to call.         Sincerely,          Beni Veliz DPM        CC: No Recipients

## 2024-06-27 NOTE — PROGRESS NOTES
Assessment/Plan:     The patient's clinical examination today significant for a healed right hallux amputation site.  There are no signs of infection.  She does note some persistent neuritic sensations at the amputation site as of the toe still there.  Symptoms are improved with the use of gabapentin as currently prescribed.    The patient has been cleared to return to work effective July 1, 2024.  I will limit her to 3-hour workdays for the first month back.  Taking gabapentin at his current dosing as she does find it helpful.    Recommend follow-up in 4 weeks that we can reassess her work status.     Diagnoses and all orders for this visit:    History of amputation of right great toe (HCC)    Diabetic polyneuropathy associated with type 2 diabetes mellitus (HCC)    Peripheral arterial disease (HCC)    Phantom pain after amputation of lower extremity ([facility])          Subjective:     Patient ID: Georgie King is a 59 y.o. female.    The patient presents today for follow-up status post right great toe amputation secondary to gangrene and osteomyelitis.  The patient is doing well but still notes some persistent neuritic type pains, noting that it feels like her toe is still there.  She does note the gabapentin is helpful at its current dose.  She would like to return to work however is concerned about how long she can stay on her feet.  She is amenable to starting 3-hour workdays.      PAST MEDICAL HISTORY:  Past Medical History:   Diagnosis Date    Acute ischemia of Right great toe 02/21/2024    Dyslipidemia 02/21/2024    PAD (peripheral artery disease) ([facility]) 02/19/2024    Tobacco abuse 02/19/2024    Type 2 diabetes mellitus, without long-term current use of insulin ([facility]) 02/19/2024       PAST SURGICAL HISTORY:  Past Surgical History:   Procedure Laterality Date    APPENDECTOMY      IR LOWER EXTREMITY ANGIOGRAM  2/21/2024    IR LOWER EXTREMITY ANGIOGRAM  2/21/2024    THROMBECTOMY W/ EMBOLECTOMY Right 2/21/2024     Procedure: EMBOLECTOMY/THROMBECTOMY LOWER EXTREMITY Right;  Surgeon: Linwood Hayes MD;  [facility];  Service: Vascular    TOE AMPUTATION Right 4/2/2024    Procedure: AMPUTATION great TOE;  Surgeon: Beni Veliz DPM;  [facility];  Service: Podiatry        ALLERGIES:  Patient has no known allergies.    MEDICATIONS:  Current Outpatient Medications   Medication Sig Dispense Refill    Alcohol Swabs 70 % PADS May substitute brand based on insurance coverage. Check glucose ACHS. 200 each 0    amLODIPine (NORVASC) 5 mg tablet Take 1 tablet (5 mg total) by mouth daily 90 tablet 1    atorvastatin (LIPITOR) 40 mg tablet Take 1 tablet (40 mg total) by mouth daily with dinner 90 tablet 3    Blood Glucose Monitoring Suppl (OneTouch Verio Reflect) w/Device KIT May substitute brand based on insurance coverage. Check glucose ACHS. 1 kit 0    clopidogrel (PLAVIX) 75 mg tablet Take 1 tablet (75 mg total) by mouth daily 90 tablet 3    Continuous Glucose Sensor (Dexcom G7 Sensor) Use 1 Device every 10 days 9 each 3    gabapentin (Neurontin) 100 mg capsule Take 1 capsule (100 mg total) by mouth daily at bedtime 30 capsule 2    glimepiride (AMARYL) 2 mg tablet Take 1 tablet (2 mg total) by mouth daily with breakfast 90 tablet 1    glucose blood (OneTouch Verio) test strip May substitute brand based on insurance coverage. Check glucose ACHS. 200 each 0    lisinopril (ZESTRIL) 10 mg tablet Take 1 tablet (10 mg total) by mouth daily 90 tablet 1    metFORMIN (GLUCOPHAGE-XR) 750 mg 24 hr tablet Take 1 tablet (750 mg total) by mouth 2 (two) times a day with meals 180 tablet 1    OneTouch Delica Lancets 33G MISC May substitute brand based on insurance coverage. Check glucose ACHS. 200 each 0    rivaroxaban (Xarelto) 20 mg tablet Take 1 tablet (20 mg total) by mouth daily with breakfast 90 tablet 3    acetaminophen (TYLENOL) 325 mg tablet Take 2 tablets (650 mg total) by mouth every 4 (four) hours as needed for  mild pain for up to 30 doses (Patient not taking: Reported on 6/10/2024) 30 tablet 0    ibuprofen (MOTRIN) 600 mg tablet Take 1 tablet (600 mg total) by mouth every 6 (six) hours as needed for mild pain for up to 30 doses (Patient not taking: Reported on 6/10/2024) 30 tablet 0     No current facility-administered medications for this visit.       SOCIAL HISTORY:  Social History     Socioeconomic History    Marital status: /Civil Union     Spouse name: None    Number of children: None    Years of education: None    Highest education level: None   Occupational History    None   Tobacco Use    Smoking status: Former     Current packs/day: 0.50     Types: Cigarettes    Smokeless tobacco: Never   Vaping Use    Vaping status: Never Used   Substance and Sexual Activity    Alcohol use: Not Currently     Comment: occ    Drug use: Never    Sexual activity: Yes     Partners: Male   Other Topics Concern    None   Social History Narrative    None     Social Determinants of Health     Financial Resource Strain: Not on file   Food Insecurity: No Food Insecurity (2/23/2024)    Hunger Vital Sign     Worried About Running Out of Food in the Last Year: Never true     Ran Out of Food in the Last Year: Never true   Transportation Needs: No Transportation Needs (2/23/2024)    PRAPARE - Transportation     Lack of Transportation (Medical): No     Lack of Transportation (Non-Medical): No   Physical Activity: Not on file   Stress: Not on file   Social Connections: Not on file   Intimate Partner Violence: Not on file   Housing Stability: Low Risk  (2/23/2024)    Housing Stability Vital Sign     Unable to Pay for Housing in the Last Year: No     Number of Times Moved in the Last Year: 1     Homeless in the Last Year: No        Review of Systems   Constitutional: Negative.    HENT: Negative.     Eyes: Negative.    Respiratory: Negative.     Cardiovascular: Negative.    Endocrine: Negative.    Musculoskeletal: Negative.    Neurological:  Negative.    Hematological: Negative.    Psychiatric/Behavioral: Negative.           Objective:     Physical Exam  Constitutional:       Appearance: Normal appearance.   HENT:      Head: Normocephalic and atraumatic.      Nose: Nose normal.   Cardiovascular:      Pulses:           Dorsalis pedis pulses are 0 on the right side.        Posterior tibial pulses are 0 on the right side.   Pulmonary:      Effort: Pulmonary effort is normal.   Feet:      Right foot:      Skin integrity: Skin integrity normal.      Comments: The patient's clinical examination today significant for a healed right hallux amputation site.  There are no signs of infection.  She does note some persistent neuritic sensations at the amputation site as of the toe still there.  Symptoms are improved with the use of gabapentin as currently prescribed.  Skin:     General: Skin is warm.      Capillary Refill: Capillary refill takes less than 2 seconds.   Neurological:      General: No focal deficit present.      Mental Status: She is alert and oriented to person, place, and time.   Psychiatric:         Mood and Affect: Mood normal.         Behavior: Behavior normal.         Thought Content: Thought content normal.

## 2024-07-13 DIAGNOSIS — G54.6 PHANTOM PAIN AFTER AMPUTATION OF LOWER EXTREMITY (HCC): ICD-10-CM

## 2024-07-13 DIAGNOSIS — E11.9 TYPE 2 DIABETES MELLITUS, WITHOUT LONG-TERM CURRENT USE OF INSULIN (HCC): ICD-10-CM

## 2024-07-13 DIAGNOSIS — E11.42 DIABETIC POLYNEUROPATHY ASSOCIATED WITH TYPE 2 DIABETES MELLITUS (HCC): ICD-10-CM

## 2024-07-13 DIAGNOSIS — I10 HTN (HYPERTENSION): ICD-10-CM

## 2024-07-13 PROCEDURE — 4010F ACE/ARB THERAPY RXD/TAKEN: CPT | Performed by: PODIATRIST

## 2024-07-13 RX ORDER — GABAPENTIN 100 MG/1
100 CAPSULE ORAL
Qty: 30 CAPSULE | Refills: 5 | Status: SHIPPED | OUTPATIENT
Start: 2024-07-13

## 2024-07-13 RX ORDER — AMLODIPINE BESYLATE 5 MG/1
5 TABLET ORAL DAILY
Qty: 100 TABLET | Refills: 1 | Status: SHIPPED | OUTPATIENT
Start: 2024-07-13

## 2024-07-13 RX ORDER — LISINOPRIL 10 MG/1
10 TABLET ORAL DAILY
Qty: 100 TABLET | Refills: 1 | Status: SHIPPED | OUTPATIENT
Start: 2024-07-13

## 2024-07-13 RX ORDER — GLIMEPIRIDE 2 MG/1
2 TABLET ORAL
Qty: 100 TABLET | Refills: 1 | Status: SHIPPED | OUTPATIENT
Start: 2024-07-13

## 2024-08-01 ENCOUNTER — OFFICE VISIT (OUTPATIENT)
Dept: PODIATRY | Facility: CLINIC | Age: 59
End: 2024-08-01
Payer: COMMERCIAL

## 2024-08-01 VITALS
HEIGHT: 61 IN | OXYGEN SATURATION: 97 % | BODY MASS INDEX: 45.88 KG/M2 | SYSTOLIC BLOOD PRESSURE: 135 MMHG | WEIGHT: 243 LBS | HEART RATE: 91 BPM | DIASTOLIC BLOOD PRESSURE: 82 MMHG

## 2024-08-01 DIAGNOSIS — G54.6 PHANTOM PAIN AFTER AMPUTATION OF LOWER EXTREMITY (HCC): ICD-10-CM

## 2024-08-01 DIAGNOSIS — I73.9 PERIPHERAL ARTERIAL DISEASE (HCC): ICD-10-CM

## 2024-08-01 DIAGNOSIS — Z89.411 HISTORY OF AMPUTATION OF RIGHT GREAT TOE (HCC): Primary | ICD-10-CM

## 2024-08-01 DIAGNOSIS — E11.42 DIABETIC POLYNEUROPATHY ASSOCIATED WITH TYPE 2 DIABETES MELLITUS (HCC): ICD-10-CM

## 2024-08-01 PROCEDURE — 99213 OFFICE O/P EST LOW 20 MIN: CPT | Performed by: PODIATRIST

## 2024-08-01 PROCEDURE — 3079F DIAST BP 80-89 MM HG: CPT | Performed by: PODIATRIST

## 2024-08-01 PROCEDURE — 3075F SYST BP GE 130 - 139MM HG: CPT | Performed by: PODIATRIST

## 2024-08-01 NOTE — PROGRESS NOTES
Assessment/Plan:     The patient's clinical examination today is relatively benign.  Her amputation site is well-healed without any signs of infection.  The patient still notes generalized tenderness to right lower extremity after standing for 3 hours at work.  She would like to continue her current work restrictions.    A work note was provided to the patient today to continue 3-hour shifts for the next 3 months.  Will reevaluate her at that time.  She also notes a upcoming follow-up with vascular surgery.    There are no other acute pedal issues noted today.  Follow-up in 3 months, sooner as needed.     Diagnoses and all orders for this visit:    History of amputation of right great toe (HCC)    Phantom pain after amputation of lower extremity (HCC)    Diabetic polyneuropathy associated with type 2 diabetes mellitus (HCC)    Peripheral arterial disease (HCC)          Subjective:     Patient ID: Georgie King is a 59 y.o. female.    The patient presents today for follow-up status post right great toe amputation secondary to gangrene and osteomyelitis of the great toe.  She has been doing well in regards to her postsurgical foot.  There are no open lesions.  The incision line is well-healed.  She has returned to work with 3-hour shifts and is able to tolerate that well.  However she notes that by the end of that 3-hour shift, she is noting in her last soreness in her right lower extremity.  She would like to continue these work restrictions.      PAST MEDICAL HISTORY:  Past Medical History:   Diagnosis Date    Acute ischemia of Right great toe 02/21/2024    Dyslipidemia 02/21/2024    PAD (peripheral artery disease) ([facility]) 02/19/2024    Tobacco abuse 02/19/2024    Type 2 diabetes mellitus, without long-term current use of insulin ([facility]) 02/19/2024       PAST SURGICAL HISTORY:  Past Surgical History:   Procedure Laterality Date    APPENDECTOMY      IR LOWER EXTREMITY ANGIOGRAM  2/21/2024    IR LOWER EXTREMITY ANGIOGRAM   2/21/2024    THROMBECTOMY W/ EMBOLECTOMY Right 2/21/2024    Procedure: EMBOLECTOMY/THROMBECTOMY LOWER EXTREMITY Right;  Surgeon: Linwood Hayes MD;  [facility];  Service: Vascular    TOE AMPUTATION Right 4/2/2024    Procedure: AMPUTATION great TOE;  Surgeon: Beni Veliz DPM;  [facility];  Service: Podiatry        ALLERGIES:  Patient has no known allergies.    MEDICATIONS:  Current Outpatient Medications   Medication Sig Dispense Refill    Alcohol Swabs 70 % PADS May substitute brand based on insurance coverage. Check glucose ACHS. 200 each 0    amLODIPine (NORVASC) 5 mg tablet TAKE 1 TABLET (5 MG TOTAL) BY MOUTH DAILY. 100 tablet 1    atorvastatin (LIPITOR) 40 mg tablet Take 1 tablet (40 mg total) by mouth daily with dinner 90 tablet 3    Blood Glucose Monitoring Suppl (OneTouch Verio Reflect) w/Device KIT May substitute brand based on insurance coverage. Check glucose ACHS. 1 kit 0    clopidogrel (PLAVIX) 75 mg tablet Take 1 tablet (75 mg total) by mouth daily 90 tablet 3    Continuous Glucose Sensor (Dexcom G7 Sensor) Use 1 Device every 10 days 9 each 3    gabapentin (NEURONTIN) 100 mg capsule TAKE 1 CAPSULE BY MOUTH AT BEDTIME 30 capsule 5    glimepiride (AMARYL) 2 mg tablet TAKE 1 TABLET BY MOUTH DAILY WITH BREAKFAST 100 tablet 1    glucose blood (OneTouch Verio) test strip May substitute brand based on insurance coverage. Check glucose ACHS. 200 each 0    lisinopril (ZESTRIL) 10 mg tablet TAKE 1 TABLET BY MOUTH EVERY  tablet 1    metFORMIN (GLUCOPHAGE-XR) 750 mg 24 hr tablet Take 1 tablet (750 mg total) by mouth 2 (two) times a day with meals 180 tablet 1    OneTouch Delica Lancets 33G MISC May substitute brand based on insurance coverage. Check glucose ACHS. 200 each 0    rivaroxaban (Xarelto) 20 mg tablet Take 1 tablet (20 mg total) by mouth daily with breakfast 90 tablet 3    acetaminophen (TYLENOL) 325 mg tablet Take 2 tablets (650 mg total) by mouth every 4 (four)  hours as needed for mild pain for up to 30 doses (Patient not taking: Reported on 6/10/2024) 30 tablet 0    ibuprofen (MOTRIN) 600 mg tablet Take 1 tablet (600 mg total) by mouth every 6 (six) hours as needed for mild pain for up to 30 doses (Patient not taking: Reported on 6/10/2024) 30 tablet 0     No current facility-administered medications for this visit.       SOCIAL HISTORY:  Social History     Socioeconomic History    Marital status: /Civil Union     Spouse name: None    Number of children: None    Years of education: None    Highest education level: None   Occupational History    None   Tobacco Use    Smoking status: Former     Current packs/day: 0.50     Types: Cigarettes    Smokeless tobacco: Never   Vaping Use    Vaping status: Never Used   Substance and Sexual Activity    Alcohol use: Not Currently     Comment: occ    Drug use: Never    Sexual activity: Yes     Partners: Male   Other Topics Concern    None   Social History Narrative    None     Social Determinants of Health     Financial Resource Strain: Not on file   Food Insecurity: No Food Insecurity (2/23/2024)    Hunger Vital Sign     Worried About Running Out of Food in the Last Year: Never true     Ran Out of Food in the Last Year: Never true   Transportation Needs: No Transportation Needs (2/23/2024)    PRAPARE - Transportation     Lack of Transportation (Medical): No     Lack of Transportation (Non-Medical): No   Physical Activity: Not on file   Stress: Not on file   Social Connections: Not on file   Intimate Partner Violence: Not on file   Housing Stability: Low Risk  (2/23/2024)    Housing Stability Vital Sign     Unable to Pay for Housing in the Last Year: No     Number of Times Moved in the Last Year: 1     Homeless in the Last Year: No        Review of Systems   Constitutional: Negative.    HENT: Negative.     Eyes: Negative.    Respiratory: Negative.     Cardiovascular: Negative.    Endocrine: Negative.    Musculoskeletal:  Negative.    Neurological: Negative.    Hematological: Negative.    Psychiatric/Behavioral: Negative.           Objective:     Physical Exam  Constitutional:       Appearance: Normal appearance.   HENT:      Head: Normocephalic and atraumatic.      Nose: Nose normal.   Pulmonary:      Effort: Pulmonary effort is normal.   Skin:     General: Skin is warm.      Capillary Refill: Capillary refill takes less than 2 seconds.   Neurological:      General: No focal deficit present.      Mental Status: She is alert and oriented to person, place, and time.   Psychiatric:         Mood and Affect: Mood normal.         Behavior: Behavior normal.         Thought Content: Thought content normal.

## 2024-08-01 NOTE — LETTER
August 1, 2024     Patient: Georgie King  YOB: 1965  Date of Visit: 8/1/2024      To Whom it May Concern:    Georgie King is under my professional care. Georgie was seen in my office on 8/1/2024. Georgie is still having pain in her right foot. She will need to continue with 3 hour shifts for another 3 months.    If you have any questions or concerns, please don't hesitate to call.         Sincerely,          Beni Veliz DPM        CC: No Recipients

## 2024-08-13 ENCOUNTER — VBI (OUTPATIENT)
Dept: ADMINISTRATIVE | Facility: OTHER | Age: 59
End: 2024-08-13

## 2024-08-13 NOTE — TELEPHONE ENCOUNTER
08/13/24 8:52 AM     Chart reviewed for Pap Smear (HPV) aka Cervical Cancer Screening ; nothing is submitted to the patient's insurance at this time.     DAVID FREIRE MA   [facility]

## 2024-08-29 LAB
ALBUMIN SERPL-MCNC: 4.2 G/DL (ref 3.6–5.1)
ALBUMIN/GLOB SERPL: 1.5 (CALC) (ref 1–2.5)
ALP SERPL-CCNC: 107 U/L (ref 37–153)
ALT SERPL-CCNC: 12 U/L (ref 6–29)
AST SERPL-CCNC: 11 U/L (ref 10–35)
BASOPHILS # BLD AUTO: 80 CELLS/UL (ref 0–200)
BASOPHILS NFR BLD AUTO: 0.6 %
BILIRUB SERPL-MCNC: 0.3 MG/DL (ref 0.2–1.2)
BUN SERPL-MCNC: 10 MG/DL (ref 7–25)
BUN/CREAT SERPL: ABNORMAL (CALC) (ref 6–22)
CALCIUM SERPL-MCNC: 9.6 MG/DL (ref 8.6–10.4)
CHLORIDE SERPL-SCNC: 105 MMOL/L (ref 98–110)
CO2 SERPL-SCNC: 26 MMOL/L (ref 20–32)
CREAT SERPL-MCNC: 0.75 MG/DL (ref 0.5–1.05)
EOSINOPHIL # BLD AUTO: 173 CELLS/UL (ref 15–500)
EOSINOPHIL NFR BLD AUTO: 1.3 %
ERYTHROCYTE [DISTWIDTH] IN BLOOD BY AUTOMATED COUNT: 13.4 % (ref 11–15)
FERRITIN SERPL-MCNC: 34 NG/ML (ref 16–232)
GFR/BSA.PRED SERPLBLD CYS-BASED-ARV: 91 ML/MIN/1.73M2
GLOBULIN SER CALC-MCNC: 2.8 G/DL (CALC) (ref 1.9–3.7)
GLUCOSE SERPL-MCNC: 110 MG/DL (ref 65–99)
HBA1C MFR BLD: 7 % OF TOTAL HGB
HCT VFR BLD AUTO: 35.2 % (ref 35–45)
HGB BLD-MCNC: 10.9 G/DL (ref 11.7–15.5)
IRON SATN MFR SERPL: 10 % (CALC) (ref 16–45)
IRON SERPL-MCNC: 33 MCG/DL (ref 45–160)
LYMPHOCYTES # BLD AUTO: 3737 CELLS/UL (ref 850–3900)
LYMPHOCYTES NFR BLD AUTO: 28.1 %
MCH RBC QN AUTO: 25.6 PG (ref 27–33)
MCHC RBC AUTO-ENTMCNC: 31 G/DL (ref 32–36)
MCV RBC AUTO: 82.8 FL (ref 80–100)
MONOCYTES # BLD AUTO: 718 CELLS/UL (ref 200–950)
MONOCYTES NFR BLD AUTO: 5.4 %
NEUTROPHILS # BLD AUTO: 8592 CELLS/UL (ref 1500–7800)
NEUTROPHILS NFR BLD AUTO: 64.6 %
PLATELET # BLD AUTO: 499 THOUSAND/UL (ref 140–400)
PMV BLD REES-ECKER: 10 FL (ref 7.5–12.5)
POTASSIUM SERPL-SCNC: 4.9 MMOL/L (ref 3.5–5.3)
PROT SERPL-MCNC: 7 G/DL (ref 6.1–8.1)
RBC # BLD AUTO: 4.25 MILLION/UL (ref 3.8–5.1)
SODIUM SERPL-SCNC: 141 MMOL/L (ref 135–146)
TIBC SERPL-MCNC: 326 MCG/DL (CALC) (ref 250–450)
WBC # BLD AUTO: 13.3 THOUSAND/UL (ref 3.8–10.8)

## 2024-09-07 DIAGNOSIS — E11.9 TYPE 2 DIABETES MELLITUS, WITHOUT LONG-TERM CURRENT USE OF INSULIN (HCC): ICD-10-CM

## 2024-09-07 RX ORDER — METFORMIN HYDROCHLORIDE 750 MG/1
TABLET, EXTENDED RELEASE ORAL
Qty: 180 TABLET | Refills: 1 | Status: SHIPPED | OUTPATIENT
Start: 2024-09-07

## 2024-09-10 ENCOUNTER — OFFICE VISIT (OUTPATIENT)
Dept: INTERNAL MEDICINE CLINIC | Facility: CLINIC | Age: 59
End: 2024-09-10
Payer: COMMERCIAL

## 2024-09-10 ENCOUNTER — TELEPHONE (OUTPATIENT)
Age: 59
End: 2024-09-10

## 2024-09-10 VITALS
RESPIRATION RATE: 15 BRPM | HEART RATE: 80 BPM | DIASTOLIC BLOOD PRESSURE: 72 MMHG | BODY MASS INDEX: 47.01 KG/M2 | HEIGHT: 61 IN | WEIGHT: 249 LBS | SYSTOLIC BLOOD PRESSURE: 140 MMHG | TEMPERATURE: 98 F

## 2024-09-10 DIAGNOSIS — I10 HTN (HYPERTENSION): ICD-10-CM

## 2024-09-10 DIAGNOSIS — E78.5 DYSLIPIDEMIA: ICD-10-CM

## 2024-09-10 DIAGNOSIS — D50.9 IRON DEFICIENCY ANEMIA, UNSPECIFIED IRON DEFICIENCY ANEMIA TYPE: Primary | ICD-10-CM

## 2024-09-10 DIAGNOSIS — E11.51 DIABETES MELLITUS TYPE 2 WITH PERIPHERAL ARTERY DISEASE (HCC): ICD-10-CM

## 2024-09-10 DIAGNOSIS — E11.42 DIABETIC POLYNEUROPATHY ASSOCIATED WITH TYPE 2 DIABETES MELLITUS (HCC): ICD-10-CM

## 2024-09-10 DIAGNOSIS — E11.9 TYPE 2 DIABETES MELLITUS, WITHOUT LONG-TERM CURRENT USE OF INSULIN (HCC): ICD-10-CM

## 2024-09-10 DIAGNOSIS — F51.01 PRIMARY INSOMNIA: ICD-10-CM

## 2024-09-10 DIAGNOSIS — I73.9 PAD (PERIPHERAL ARTERY DISEASE) (HCC): ICD-10-CM

## 2024-09-10 PROCEDURE — 99214 OFFICE O/P EST MOD 30 MIN: CPT | Performed by: INTERNAL MEDICINE

## 2024-09-10 RX ORDER — GLIMEPIRIDE 2 MG/1
2 TABLET ORAL
Qty: 100 TABLET | Refills: 1 | Status: SHIPPED | OUTPATIENT
Start: 2024-09-10

## 2024-09-10 RX ORDER — LISINOPRIL 10 MG/1
10 TABLET ORAL DAILY
Qty: 100 TABLET | Refills: 1 | Status: SHIPPED | OUTPATIENT
Start: 2024-09-10

## 2024-09-10 RX ORDER — FERROUS SULFATE 324(65)MG
324 TABLET, DELAYED RELEASE (ENTERIC COATED) ORAL
Qty: 90 TABLET | Refills: 1 | Status: SHIPPED | OUTPATIENT
Start: 2024-09-10

## 2024-09-10 RX ORDER — TRAZODONE HYDROCHLORIDE 50 MG/1
50 TABLET, FILM COATED ORAL
Qty: 30 TABLET | Refills: 0 | Status: SHIPPED | OUTPATIENT
Start: 2024-09-10

## 2024-09-10 RX ORDER — AMLODIPINE BESYLATE 5 MG/1
5 TABLET ORAL DAILY
Qty: 100 TABLET | Refills: 1 | Status: SHIPPED | OUTPATIENT
Start: 2024-09-10

## 2024-09-10 NOTE — ASSESSMENT & PLAN NOTE
Bothersome insomnia.  Poor response to melatonin.  Patient inquires about other agents for sleep insomnia.  Will trial trazodone 50 mg at bedtime as needed  -Continue with healthy sleep hygiene practices

## 2024-09-10 NOTE — ASSESSMENT & PLAN NOTE
Hemoglobin of 10.9 on most recent set of labs.  Normocytic.  Ferritin level of 34, iron sat of 10%, probable component of iron deficiency.  Platelet count of 499K possibly reactive thrombocytosis.  Elevated neutrophil count possibly related to tobacco use.  Patient does not report any bloody stools today    Recommend endoscopic evaluation to look for source of anemia, patient wishes to defer for now.  She states that she has multiple co-pays for seeing 3 doctors and just got back to work as a  and wants things to stabilize a bit prior to pursuing additional testing.  Will readdress at follow-up visit  -For now, start ferrous sulfate once daily.  Counseled on common side effects  -Recheck labs prior to follow-up

## 2024-09-10 NOTE — ASSESSMENT & PLAN NOTE
HbA1c: 7.0 August 2024, 7.4 May 2024  Current medications: Metformin, glimepiride  Statin: Yes  Albuminuria/ACEi/ARB: No significant microalbuminuria  Retinopathy: Due for eye exam     A1c at goal  Continue current regimen  DEXCOM order previously placed at patient request, she was able to get this covered by insurance and has been using at home   Recommend diabetic eye exam for retinopathy screening

## 2024-09-10 NOTE — PROGRESS NOTES
Ambulatory Visit  Name: Georgie King      : 1965      MRN: [id number]  Encounter Provider: Bassem Barton DO  Encounter Date: 9/10/2024   Encounter department: [facility] INTERNAL MEDICINE [place]    Medical history of type 2 diabetes, hypertension, dyslipidemia, peripheral arterial disease, nicotine dependence       Assessment & Plan   1. Iron deficiency anemia, unspecified iron deficiency anemia type  Assessment & Plan:  Hemoglobin of 10.9 on most recent set of labs.  Normocytic.  Ferritin level of 34, iron sat of 10%, probable component of iron deficiency.  Platelet count of 499K possibly reactive thrombocytosis.  Elevated neutrophil count possibly related to tobacco use.  Patient does not report any bloody stools today    Recommend endoscopic evaluation to look for source of anemia, patient wishes to defer for now.  She states that she has multiple co-pays for seeing 3 doctors and just got back to work as a  and wants things to stabilize a bit prior to pursuing additional testing.  Will readdress at follow-up visit  -For now, start ferrous sulfate once daily.  Counseled on common side effects  -Recheck labs prior to follow-up  Orders:  -     ferrous sulfate 324 (65 Fe) mg; Take 1 tablet (324 mg total) by mouth daily before breakfast  -     Iron Panel (Includes Ferritin, Iron Sat%, Iron, and TIBC); Future  2. Diabetes mellitus type 2 with peripheral artery disease (HCC)  Assessment & Plan:  HbA1c: 7., 7.4 May 2024  Current medications: Metformin, glimepiride  Statin: Yes  Albuminuria/ACEi/ARB: No significant microalbuminuria  Retinopathy: Due for eye exam     A1c at goal  Continue current regimen  DEXCOM order previously placed at patient request, she was able to get this covered by insurance and has been using at home   Recommend diabetic eye exam for retinopathy screening  Orders:  -     CBC and differential; Future  -     Hemoglobin A1C; Future  -     Basic metabolic panel;  Future  -     CBC and differential  -     Basic metabolic panel  3. PAD (peripheral artery disease) ([facility])  Assessment & Plan:  Admission February 2024 for distal embolism of the right toe, necrotic appearing right toe.  Status post EVAR/right lower extremity thrombectomy/right SFA stent placement with angioplasty PT/AT  -Status post right hallux amputation April 2024    -Continue follow-up with vascular surgery, podiatry  -Continue clopidogrel, Xarelto, atorvastatin  -Attention to vascular risk factors  4. Dyslipidemia  Assessment & Plan:  Well-controlled  Continue atorvastatin  5. Diabetic polyneuropathy associated with type 2 diabetes mellitus ([facility])  6. Type 2 diabetes mellitus, without long-term current use of insulin ([facility])  -     glimepiride (AMARYL) 2 mg tablet; Take 1 tablet (2 mg total) by mouth daily with breakfast  7. HTN (hypertension)  Assessment & Plan:  Relatively well-controlled  Continue amlodipine, lisinopril at current doses for now  Orders:  -     amLODIPine (NORVASC) 5 mg tablet; Take 1 tablet (5 mg total) by mouth daily  -     lisinopril (ZESTRIL) 10 mg tablet; Take 1 tablet (10 mg total) by mouth daily  8. Primary insomnia  Assessment & Plan:  Bothersome insomnia.  Poor response to melatonin.  Patient inquires about other agents for sleep insomnia.  Will trial trazodone 50 mg at bedtime as needed  -Continue with healthy sleep hygiene practices  Orders:  -     traZODone (DESYREL) 50 mg tablet; Take 1 tablet (50 mg total) by mouth daily at bedtime as needed for sleep       History of Present Illness     HPI    Review of Systems  Current Outpatient Medications on File Prior to Visit   Medication Sig Dispense Refill    acetaminophen (TYLENOL) 325 mg tablet Take 2 tablets (650 mg total) by mouth every 4 (four) hours as needed for mild pain for up to 30 doses 30 tablet 0    atorvastatin (LIPITOR) 40 mg tablet Take 1 tablet (40 mg total) by mouth daily with dinner 90 tablet 3    clopidogrel (PLAVIX) 75  "mg tablet Take 1 tablet (75 mg total) by mouth daily 90 tablet 3    Continuous Glucose Sensor (Dexcom G7 Sensor) Use 1 Device every 10 days 9 each 3    gabapentin (NEURONTIN) 100 mg capsule TAKE 1 CAPSULE BY MOUTH AT BEDTIME 30 capsule 5    ibuprofen (MOTRIN) 600 mg tablet Take 1 tablet (600 mg total) by mouth every 6 (six) hours as needed for mild pain for up to 30 doses 30 tablet 0    metFORMIN (GLUCOPHAGE-XR) 750 mg 24 hr tablet TAKE 1 TABLET BY MOUTH 2 TIMES A DAY WITH MEALS. 180 tablet 1    rivaroxaban (Xarelto) 20 mg tablet Take 1 tablet (20 mg total) by mouth daily with breakfast 90 tablet 3    [DISCONTINUED] amLODIPine (NORVASC) 5 mg tablet TAKE 1 TABLET (5 MG TOTAL) BY MOUTH DAILY. 100 tablet 1    [DISCONTINUED] glimepiride (AMARYL) 2 mg tablet TAKE 1 TABLET BY MOUTH DAILY WITH BREAKFAST 100 tablet 1    [DISCONTINUED] lisinopril (ZESTRIL) 10 mg tablet TAKE 1 TABLET BY MOUTH EVERY  tablet 1    Alcohol Swabs 70 % PADS May substitute brand based on insurance coverage. Check glucose ACHS. 200 each 0    [DISCONTINUED] Blood Glucose Monitoring Suppl (OneTouch Verio Reflect) w/Device KIT May substitute brand based on insurance coverage. Check glucose ACHS. (Patient not taking: Reported on 9/10/2024) 1 kit 0    [DISCONTINUED] glucose blood (OneTouch Verio) test strip May substitute brand based on insurance coverage. Check glucose ACHS. (Patient not taking: Reported on 9/10/2024) 200 each 0    [DISCONTINUED] OneTouch Delica Lancets 33G MISC May substitute brand based on insurance coverage. Check glucose ACHS. (Patient not taking: Reported on 9/10/2024) 200 each 0     No current facility-administered medications on file prior to visit.      Objective     /72 (BP Location: Left arm, Patient Position: Sitting, Cuff Size: Large)   Pulse 80   Temp 98 °F (36.7 °C)   Resp 15   Ht 5' 1\" (1.549 m)   Wt 113 kg (249 lb)   BMI 47.05 kg/m²     Physical Exam  Cardiovascular:      Rate and Rhythm: Normal rate " and regular rhythm.      Heart sounds: Normal heart sounds. No murmur heard.  Pulmonary:      Effort: Pulmonary effort is normal.      Breath sounds: Normal breath sounds. No wheezing, rhonchi or rales.       Administrative Statements

## 2024-09-10 NOTE — ASSESSMENT & PLAN NOTE
Admission February 2024 for distal embolism of the right toe, necrotic appearing right toe.  Status post EVAR/right lower extremity thrombectomy/right SFA stent placement with angioplasty PT/AT  -Status post right hallux amputation April 2024  -Continue follow-up with vascular surgery, podiatry as directed    -Continue clopidogrel, Xarelto, atorvastatin  -Attention to vascular risk factors

## 2024-09-11 NOTE — TELEPHONE ENCOUNTER
PA for ferrous sulfate 324 (65 Fe) mg SUBMITTED     via    []CMM-KEY:   [][facility]-Case ID # [id number]   []Faxed to plan   []Other website   []Phone call Case ID #     Office notes sent, clinical questions answered. Awaiting determination    Turnaround time for your insurance to make a decision on your Prior Authorization can take 7-21 business days.

## 2024-09-13 NOTE — TELEPHONE ENCOUNTER
PA for PA for ferrous sulfate 324 (65 Fe) mg SUBMITTED DENIED/EXCLUDED from plan       Reason:(Screenshot if applicable)        Message sent to office clinical pool Yes

## 2024-09-26 DIAGNOSIS — E11.51 DIABETES MELLITUS TYPE 2 WITH PERIPHERAL ARTERY DISEASE (HCC): ICD-10-CM

## 2024-09-26 DIAGNOSIS — F51.01 PRIMARY INSOMNIA: ICD-10-CM

## 2024-09-26 RX ORDER — ACYCLOVIR 400 MG/1
1 TABLET ORAL ONCE
Qty: 1 EACH | Refills: 0 | Status: SHIPPED | OUTPATIENT
Start: 2024-09-26 | End: 2024-09-26

## 2024-09-26 RX ORDER — TRAZODONE HYDROCHLORIDE 50 MG/1
50 TABLET, FILM COATED ORAL
Qty: 30 TABLET | Refills: 0 | Status: SHIPPED | OUTPATIENT
Start: 2024-09-26

## 2024-10-04 ENCOUNTER — VBI (OUTPATIENT)
Dept: ADMINISTRATIVE | Facility: OTHER | Age: 59
End: 2024-10-04

## 2024-10-04 NOTE — TELEPHONE ENCOUNTER
10/04/24 8:01 AM     Chart reviewed for Diabetic Eye Exam ; nothing is submitted to the patient's insurance at this time.     DAVID FREIRE MA   [facility]

## 2024-10-22 ENCOUNTER — TELEPHONE (OUTPATIENT)
Dept: INTERNAL MEDICINE CLINIC | Facility: CLINIC | Age: 59
End: 2024-10-22

## 2024-10-27 DIAGNOSIS — F51.01 PRIMARY INSOMNIA: ICD-10-CM

## 2024-10-28 RX ORDER — TRAZODONE HYDROCHLORIDE 50 MG/1
50 TABLET, FILM COATED ORAL
Qty: 90 TABLET | Refills: 1 | Status: SHIPPED | OUTPATIENT
Start: 2024-10-28

## 2024-11-04 ENCOUNTER — OFFICE VISIT (OUTPATIENT)
Dept: PODIATRY | Facility: CLINIC | Age: 59
End: 2024-11-04
Payer: COMMERCIAL

## 2024-11-04 VITALS
WEIGHT: 255 LBS | HEIGHT: 61 IN | HEART RATE: 91 BPM | DIASTOLIC BLOOD PRESSURE: 94 MMHG | OXYGEN SATURATION: 98 % | SYSTOLIC BLOOD PRESSURE: 184 MMHG | BODY MASS INDEX: 48.15 KG/M2

## 2024-11-04 DIAGNOSIS — Z89.411 HISTORY OF AMPUTATION OF RIGHT GREAT TOE (HCC): ICD-10-CM

## 2024-11-04 DIAGNOSIS — B35.1 ONYCHOMYCOSIS: Primary | ICD-10-CM

## 2024-11-04 DIAGNOSIS — E11.42 DIABETIC POLYNEUROPATHY ASSOCIATED WITH TYPE 2 DIABETES MELLITUS (HCC): ICD-10-CM

## 2024-11-04 DIAGNOSIS — G54.6 PHANTOM PAIN AFTER AMPUTATION OF LOWER EXTREMITY (HCC): ICD-10-CM

## 2024-11-04 PROCEDURE — 99213 OFFICE O/P EST LOW 20 MIN: CPT | Performed by: PODIATRIST

## 2024-11-04 PROCEDURE — 11721 DEBRIDE NAIL 6 OR MORE: CPT | Performed by: PODIATRIST

## 2024-11-04 RX ORDER — GABAPENTIN 100 MG/1
100 CAPSULE ORAL
Qty: 30 CAPSULE | Refills: 5 | Status: SHIPPED | OUTPATIENT
Start: 2024-11-04

## 2024-11-04 NOTE — LETTER
November 4, 2024     Patient: Georgie King  YOB: 1965  Date of Visit: 11/4/2024      To Whom it May Concern:    Georgie King is under my professional care. Georgie was seen in my office on 11/4/2024. Georgie is still having pain in her right foot with prolonged periods of weightbearing. She will need to continue with 3 hour shifts for another 6 months.    If you have any questions or concerns, please don't hesitate to call.         Sincerely,          Beni Veliz DPM        CC: No Recipients

## 2024-11-04 NOTE — PROGRESS NOTES
Assessment/Plan:     The patient's clinical examination today is relatively benign.  Her amputation site is well-healed without any signs of infection.  The pedal nail plates are brittle, thickened and discolored with dystrophy consistent with onychomycosis x 9.  The patient still notes generalized tenderness to the right lower extremity after standing for 3 hours at work.  She does not feel she can go longer than 3 hours at work.    Her most recent hemoglobin A1c from August 2024 was 7.0, prior to that it was 7.4 in May 2024.    The pedal nail plates were sharply debrided with a sterile nail clipper x 9 without complication.  The nails of the  reduce the thickness and girth utilizing rotary bur without complication.  She continues to do well with the current dose of gabapentin which is helping control her neuritic pain especially at nighttime.  She does not have any side effects from it.  A renewal was placed and sent to her pharmacy.      A work note was provided to the patient today to continue 3-hour shifts for the next 3 months.  Will reevaluate her at that time.       There are no other acute pedal issues noted today.  Follow-up in 3-4 months, sooner as needed.           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 adult.    The patient presents today for follow-up at risk diabetic footcare.  She has been doing well since her right hallux amputation.  She is currently working 3-hour shifts and seems to tolerate that well.  She does note that if she  chose to go beyond 3 hours she is significant tingling and burning sensations in her feet.  She would like to continue her current work restrictions.      PAST MEDICAL HISTORY:  Past Medical History:   Diagnosis Date    Acute ischemia of Right great toe 02/21/2024    Dyslipidemia 02/21/2024    PAD (peripheral artery disease) (HCC) 02/19/2024    Tobacco abuse 02/19/2024    Type 2 diabetes mellitus, without long-term current use of insulin ([facility]) 02/19/2024       PAST SURGICAL HISTORY:  Past Surgical History:   Procedure Laterality Date    APPENDECTOMY      IR LOWER EXTREMITY ANGIOGRAM  2/21/2024    IR LOWER EXTREMITY ANGIOGRAM  2/21/2024    THROMBECTOMY W/ EMBOLECTOMY Right 2/21/2024    Procedure: EMBOLECTOMY/THROMBECTOMY LOWER EXTREMITY Right;  Surgeon: Linwood Hayes MD;  Location: [facility];  Service: Vascular    TOE AMPUTATION Right 4/2/2024    Procedure: AMPUTATION great TOE;  Surgeon: Beni Veliz DPM;  Location: [facility];  Service: Podiatry        ALLERGIES:  Patient has no known allergies.    MEDICATIONS:  Current Outpatient Medications   Medication Sig Dispense Refill    acetaminophen (TYLENOL) 325 mg tablet Take 2 tablets (650 mg total) by mouth every 4 (four) hours as needed for mild pain for up to 30 doses 30 tablet 0    Alcohol Swabs 70 % PADS May substitute brand based on insurance coverage. Check glucose ACHS. 200 each 0    amLODIPine (NORVASC) 5 mg tablet Take 1 tablet (5 mg total) by mouth daily 100 tablet 1    atorvastatin (LIPITOR) 40 mg tablet Take 1 tablet (40 mg total) by mouth daily with dinner 90 tablet 3    clopidogrel (PLAVIX) 75 mg tablet Take 1 tablet (75 mg total) by mouth daily 90 tablet 3    Continuous Glucose Sensor (Dexcom G7 Sensor) Use 1 Device every 10 days 9 each 3    ferrous sulfate 324 (65 Fe) mg Take 1 tablet (324 mg total) by mouth daily before breakfast 90 tablet 1    gabapentin (NEURONTIN) 100 mg capsule Take 1 capsule (100 mg total) by mouth daily at bedtime  30 capsule 5    glimepiride (AMARYL) 2 mg tablet Take 1 tablet (2 mg total) by mouth daily with breakfast 100 tablet 1    ibuprofen (MOTRIN) 600 mg tablet Take 1 tablet (600 mg total) by mouth every 6 (six) hours as needed for mild pain for up to 30 doses 30 tablet 0    lisinopril (ZESTRIL) 10 mg tablet Take 1 tablet (10 mg total) by mouth daily 100 tablet 1    metFORMIN (GLUCOPHAGE-XR) 750 mg 24 hr tablet TAKE 1 TABLET BY MOUTH 2 TIMES A DAY WITH MEALS. 180 tablet 1    rivaroxaban (Xarelto) 20 mg tablet Take 1 tablet (20 mg total) by mouth daily with breakfast 90 tablet 3    traZODone (DESYREL) 50 mg tablet TAKE 1 TABLET (50 MG TOTAL) BY MOUTH DAILY AT BEDTIME AS NEEDED FOR SLEEP 90 tablet 1     No current facility-administered medications for this visit.       SOCIAL HISTORY:  Social History     Socioeconomic History    Marital status: /Civil Union     Spouse name: None    Number of children: None    Years of education: None    Highest education level: None   Occupational History    None   Tobacco Use    Smoking status: Former     Current packs/day: 0.50     Types: Cigarettes    Smokeless tobacco: Never   Vaping Use    Vaping status: Never Used   Substance and Sexual Activity    Alcohol use: Not Currently     Comment: occ    Drug use: Never    Sexual activity: Yes     Partners: Male   Other Topics Concern    None   Social History Narrative    None     Social Determinants of Health     Financial Resource Strain: Not on file   Food Insecurity: No Food Insecurity (2/23/2024)    Nursing - Inadequate Food Risk Classification     Worried About Running Out of Food in the Last Year: Never true     Ran Out of Food in the Last Year: Never true     Ran Out of Food in the Last Year: Not on file   Transportation Needs: No Transportation Needs (2/23/2024)    PRAPARE - Transportation     Lack of Transportation (Medical): No     Lack of Transportation (Non-Medical): No   Physical Activity: Not on file   Stress: Not on file    Social Connections: Not on file   Intimate Partner Violence: Not on file   Housing Stability: Low Risk  (2/23/2024)    Housing Stability Vital Sign     Unable to Pay for Housing in the Last Year: No     Number of Times Moved in the Last Year: 1     Homeless in the Last Year: No        Review of Systems   Constitutional: Negative.    HENT: Negative.     Eyes: Negative.    Respiratory: Negative.     Cardiovascular: Negative.    Endocrine: Negative.    Musculoskeletal: Negative.    Neurological: Negative.    Hematological: Negative.    Psychiatric/Behavioral: Negative.           Objective:     Physical Exam  Constitutional:       Appearance: Normal appearance.   HENT:      Head: Normocephalic and atraumatic.      Nose: Nose normal.   Cardiovascular:      Pulses:           Dorsalis pedis pulses are 0 on the right side and 1+ on the left side.        Posterior tibial pulses are 0 on the right side and 0 on the left side.   Pulmonary:      Effort: Pulmonary effort is normal.   Feet:      Right foot:      Skin integrity: Skin integrity normal.      Toenail Condition: Right toenails are abnormally thick and long. Fungal disease present.     Left foot:      Skin integrity: Skin integrity normal.      Toenail Condition: Left toenails are abnormally thick and long. Fungal disease present.     Comments: The patient's clinical examination today is relatively benign.  Her amputation site is well-healed without any signs of infection.  The pedal nail plates are brittle, thickened and discolored with dystrophy consistent with onychomycosis x 9.  The patient still notes generalized tenderness to the right lower extremity after standing for 3 hours at work.  She does not feel she can go longer than 3 hours at work.     Skin:     General: Skin is warm.      Capillary Refill: Capillary refill takes 2 to 3 seconds.   Neurological:      General: No focal deficit present.      Mental Status: She is alert and oriented to person, place,  and time.   Psychiatric:         Mood and Affect: Mood normal.         Behavior: Behavior normal.         Thought Content: Thought content normal.

## 2024-11-19 ENCOUNTER — HOSPITAL ENCOUNTER (OUTPATIENT)
Dept: NON INVASIVE DIAGNOSTICS | Facility: HOSPITAL | Age: 59
Discharge: HOME | End: 2024-11-19
Attending: SURGERY
Payer: COMMERCIAL

## 2024-11-19 DIAGNOSIS — I73.9 PAD (PERIPHERAL ARTERY DISEASE) (HCC): ICD-10-CM

## 2024-11-19 PROCEDURE — 93923 UPR/LXTR ART STDY 3+ LVLS: CPT

## 2024-11-19 PROCEDURE — 93925 LOWER EXTREMITY STUDY: CPT

## 2024-11-19 PROCEDURE — 93979 VASCULAR STUDY: CPT

## 2024-11-20 PROCEDURE — 93925 LOWER EXTREMITY STUDY: CPT | Performed by: SURGERY

## 2024-11-20 PROCEDURE — 93922 UPR/L XTREMITY ART 2 LEVELS: CPT | Performed by: SURGERY

## 2024-11-20 PROCEDURE — 93979 VASCULAR STUDY: CPT | Performed by: SURGERY

## 2024-12-05 ENCOUNTER — RA CDI HCC (OUTPATIENT)
Dept: OTHER | Facility: HOSPITAL | Age: 59
End: 2024-12-05

## 2024-12-10 DIAGNOSIS — D50.9 IRON DEFICIENCY ANEMIA, UNSPECIFIED IRON DEFICIENCY ANEMIA TYPE: ICD-10-CM

## 2024-12-11 ENCOUNTER — TELEPHONE (OUTPATIENT)
Age: 59
End: 2024-12-11

## 2024-12-11 ENCOUNTER — VBI (OUTPATIENT)
Dept: ADMINISTRATIVE | Facility: OTHER | Age: 59
End: 2024-12-11

## 2024-12-11 LAB
BASOPHILS # BLD AUTO: 88 CELLS/UL (ref 0–200)
BASOPHILS NFR BLD AUTO: 0.7 %
BUN SERPL-MCNC: 22 MG/DL (ref 7–25)
BUN/CREAT SERPL: ABNORMAL (CALC) (ref 6–22)
CALCIUM SERPL-MCNC: 9.2 MG/DL (ref 8.6–10.4)
CHLORIDE SERPL-SCNC: 105 MMOL/L (ref 98–110)
CO2 SERPL-SCNC: 25 MMOL/L (ref 20–32)
CREAT SERPL-MCNC: 0.79 MG/DL (ref 0.5–1.05)
EOSINOPHIL # BLD AUTO: 288 CELLS/UL (ref 15–500)
EOSINOPHIL NFR BLD AUTO: 2.3 %
ERYTHROCYTE [DISTWIDTH] IN BLOOD BY AUTOMATED COUNT: 13.5 % (ref 11–15)
GFR/BSA.PRED SERPLBLD CYS-BASED-ARV: 86 ML/MIN/1.73M2
GLUCOSE SERPL-MCNC: 116 MG/DL (ref 65–99)
HCT VFR BLD AUTO: 33.7 % (ref 35–45)
HGB BLD-MCNC: 10.6 G/DL (ref 11.7–15.5)
LYMPHOCYTES # BLD AUTO: 3988 CELLS/UL (ref 850–3900)
LYMPHOCYTES NFR BLD AUTO: 31.9 %
MCH RBC QN AUTO: 25.8 PG (ref 27–33)
MCHC RBC AUTO-ENTMCNC: 31.5 G/DL (ref 32–36)
MCV RBC AUTO: 82 FL (ref 80–100)
MONOCYTES # BLD AUTO: 813 CELLS/UL (ref 200–950)
MONOCYTES NFR BLD AUTO: 6.5 %
NEUTROPHILS # BLD AUTO: 7325 CELLS/UL (ref 1500–7800)
NEUTROPHILS NFR BLD AUTO: 58.6 %
PLATELET # BLD AUTO: 475 THOUSAND/UL (ref 140–400)
PMV BLD REES-ECKER: 10 FL (ref 7.5–12.5)
POTASSIUM SERPL-SCNC: 4.6 MMOL/L (ref 3.5–5.3)
RBC # BLD AUTO: 4.11 MILLION/UL (ref 3.8–5.1)
SODIUM SERPL-SCNC: 139 MMOL/L (ref 135–146)
WBC # BLD AUTO: 12.5 THOUSAND/UL (ref 3.8–10.8)

## 2024-12-11 RX ORDER — FERROUS SULFATE 324(65)MG
324 TABLET, DELAYED RELEASE (ENTERIC COATED) ORAL
Qty: 90 TABLET | Refills: 1 | Status: SHIPPED | OUTPATIENT
Start: 2024-12-11

## 2024-12-11 NOTE — TELEPHONE ENCOUNTER
PA for ferrous sulfate 324mg SUBMITTED to [employer]    via    []CMM-KEY:   [x]Surescripts  []Availity-Auth ID # NDC #   []Faxed to plan   []Other website   []Phone call Case ID #     [x]PA sent as URGENT    All office notes, labs and other pertaining documents and studies sent. Clinical questions answered. Awaiting determination from insurance company.     Turnaround time for your insurance to make a decision on your Prior Authorization can take 7-21 business days.

## 2024-12-11 NOTE — TELEPHONE ENCOUNTER
PA for ferrous sulfate 324mg DENIED    Reason:    Message sent to office clinical pool     Denial letter scanned into [employer]     Appeal started  (Provider will need to decide if appeal is warranted and send clinical documentation to Prior Authorization Team for initiation.)    **Please follow up with your patient regarding denial and next steps**

## 2024-12-12 NOTE — TELEPHONE ENCOUNTER
12/11/24 7:51 PM     Chart reviewed for   Cervical Cancer Screening    ; nothing is submitted to the patient's insurance at this time.     DAVID FREIRE MA   [facility]

## 2024-12-16 ENCOUNTER — OFFICE VISIT (OUTPATIENT)
Dept: INTERNAL MEDICINE CLINIC | Facility: CLINIC | Age: 59
End: 2024-12-16
Payer: COMMERCIAL

## 2024-12-16 VITALS
HEART RATE: 104 BPM | OXYGEN SATURATION: 97 % | WEIGHT: 260 LBS | TEMPERATURE: 98 F | RESPIRATION RATE: 18 BRPM | BODY MASS INDEX: 49.09 KG/M2 | SYSTOLIC BLOOD PRESSURE: 136 MMHG | HEIGHT: 61 IN | DIASTOLIC BLOOD PRESSURE: 70 MMHG

## 2024-12-16 DIAGNOSIS — Z00.00 ANNUAL PHYSICAL EXAM: Primary | ICD-10-CM

## 2024-12-16 DIAGNOSIS — D50.9 IRON DEFICIENCY ANEMIA, UNSPECIFIED IRON DEFICIENCY ANEMIA TYPE: ICD-10-CM

## 2024-12-16 DIAGNOSIS — E11.59 TYPE 2 DIABETES MELLITUS WITH OTHER CIRCULATORY COMPLICATION, WITHOUT LONG-TERM CURRENT USE OF INSULIN (HCC): ICD-10-CM

## 2024-12-16 PROCEDURE — 99396 PREV VISIT EST AGE 40-64: CPT | Performed by: INTERNAL MEDICINE

## 2024-12-16 NOTE — PATIENT INSTRUCTIONS
"Patient Education     Routine physical for adults   The Basics   Written by the doctors and editors at [facility]   What is a physical? -- A physical is a routine visit, or \"check-up,\" with your doctor. You might also hear it called a \"wellness visit\" or \"preventive visit.\"  During each visit, the doctor will:   Ask about your physical and mental health   Ask about your habits, behaviors, and lifestyle   Do an exam   Give you vaccines if needed   Talk to you about any medicines you take   Give advice about your health   Answer your questions  Getting regular check-ups is an important part of taking care of your health. It can help your doctor find and treat any problems you have. But it's also important for preventing health problems.  A routine physical is different from a \"sick visit.\" A sick visit is when you see a doctor because of a health concern or problem. Since physicals are scheduled ahead of time, you can think about what you want to ask the doctor.  How often should I get a physical? -- It depends on your age and health. In general, for people age 21 years and older:   If you are younger than 50 years, you might be able to get a physical every 3 years.   If you are 50 years or older, your doctor might recommend a physical every year.  If you have an ongoing health condition, like diabetes or high blood pressure, your doctor will probably want to see you more often.  What happens during a physical? -- In general, each visit will include:   Physical exam - The doctor or nurse will check your height, weight, heart rate, and blood pressure. They will also look at your eyes and ears. They will ask about how you are feeling and whether you have any symptoms that bother you.   Medicines - It's a good idea to bring a list of all the medicines you take to each doctor visit. Your doctor will talk to you about your medicines and answer any questions. Tell them if you are having any side effects that bother you. You " "should also tell them if you are having trouble paying for any of your medicines.   Habits and behaviors - This includes:   Your diet   Your exercise habits   Whether you smoke, drink alcohol, or use drugs   Whether you are sexually active   Whether you feel safe at home  Your doctor will talk to you about things you can do to improve your health and lower your risk of health problems. They will also offer help and support. For example, if you want to quit smoking, they can give you advice and might prescribe medicines. If you want to improve your diet or get more physical activity, they can help you with this, too.   Lab tests, if needed - The tests you get will depend on your age and situation. For example, your doctor might want to check your:   Cholesterol   Blood sugar   Iron level   Vaccines - The recommended vaccines will depend on your age, health, and what vaccines you already had. Vaccines are very important because they can prevent certain serious or deadly infections.   Discussion of screening - \"Screening\" means checking for diseases or other health problems before they cause symptoms. Your doctor can recommend screening based on your age, risk, and preferences. This might include tests to check for:   Cancer, such as breast, prostate, cervical, ovarian, colorectal, prostate, lung, or skin cancer   Sexually transmitted infections, such as chlamydia and gonorrhea   Mental health conditions like depression and anxiety  Your doctor will talk to you about the different types of screening tests. They can help you decide which screenings to have. They can also explain what the results might mean.   Answering questions - The physical is a good time to ask the doctor or nurse questions about your health. If needed, they can refer you to other doctors or specialists, too.  Adults older than 65 years often need other care, too. As you get older, your doctor will talk to you about:   How to prevent falling at " home   Hearing or vision tests   Memory testing   How to take your medicines safely   Making sure that you have the help and support you need at home  All topics are updated as new evidence becomes available and our peer review process is complete.  This topic retrieved from [employer] on: May 02, 2024.  Topic 511061 Version 1.0  Release: 32.4.3 - C32.122  © 2024 UpToDate, Inc. and/or its affiliates. All rights reserved.  Consumer Information Use and Disclaimer   Disclaimer: This generalized information is a limited summary of diagnosis, treatment, and/or medication information. It is not meant to be comprehensive and should be used as a tool to help the user understand and/or assess potential diagnostic and treatment options. It does NOT include all information about conditions, treatments, medications, side effects, or risks that may apply to a specific patient. It is not intended to be medical advice or a substitute for the medical advice, diagnosis, or treatment of a health care provider based on the health care provider's examination and assessment of a patient's specific and unique circumstances. Patients must speak with a health care provider for complete information about their health, medical questions, and treatment options, including any risks or benefits regarding use of medications. This information does not endorse any treatments or medications as safe, effective, or approved for treating a specific patient. UpToDate, Inc. and its affiliates disclaim any warranty or liability relating to this information or the use thereof.The use of this information is governed by the Terms of Use, available at https://www.[employer] Technologiesuwer.com/en/know/clinical-effectiveness-terms. 2024© UpToDate, Inc. and its affiliates and/or licensors. All rights reserved.  Copyright   © 2024 UpToDate, Inc. and/or its affiliates. All rights reserved.

## 2024-12-16 NOTE — ASSESSMENT & PLAN NOTE
Lab Results   Component Value Date    HGBA1C 7.0 (H) 08/28/2024     Orders:  •  Albumin / creatinine urine ratio; Future  •  Comprehensive metabolic panel; Future  •  Hemoglobin A1C; Future  •  CBC and differential; Future  •  Lipid Panel with Direct LDL reflex; Future

## 2024-12-16 NOTE — PROGRESS NOTES
Adult Annual Physical  Name: Georgie King      : 1965      MRN: [id number]  Encounter Provider: Bassem Barton DO  Encounter Date: 2024   Encounter department: [facility] [place]    Here today for annual physical    Medical history of type 2 diabetes, hypertension, dyslipidemia, peripheral arterial disease, nicotine dependence   Assessment & Plan  Annual physical exam         Type 2 diabetes mellitus with other circulatory complication, without long-term current use of insulin (HCC)    Lab Results   Component Value Date    HGBA1C 7.0 (H) 2024     Orders:  •  Albumin / creatinine urine ratio; Future  •  Comprehensive metabolic panel; Future  •  Hemoglobin A1C; Future  •  CBC and differential; Future  •  Lipid Panel with Direct LDL reflex; Future    Iron deficiency anemia, unspecified iron deficiency anemia type    Orders:  •  Iron Panel (Includes Ferritin, Iron Sat%, Iron, and TIBC); Future      Immunizations and preventive care screenings were discussed with patient today. Appropriate education was printed on patient's after visit summary.    Counseling:  Alcohol/drug use: discussed moderation in alcohol intake, the recommendations for healthy alcohol use, and avoidance of illicit drug use.  Dental Health: discussed importance of regular tooth brushing, flossing, and dental visits.  Injury prevention: discussed safety/seat belts, safety helmets, smoke detectors, carbon monoxide detectors, and smoking near bedding or upholstery.  Sexual health: discussed sexually transmitted diseases, partner selection, use of condoms, avoidance of unintended pregnancy, and contraceptive alternatives.  Exercise: the importance of regular exercise/physical activity was discussed. Recommend exercise 3-5 times per week for at least 30 minutes.          History of Present Illness     Adult Annual Physical:  Patient presents for annual physical.     General Health:  - Sleep: sleeps well.  "Continues with trazodone as needed  - Hearing: normal hearing right ear and normal hearing left ear.  - Vision: wears glasses. Due for diabetic eye exam retinopathy screening/  - Dental: no dental visits for > 1 year. Due for dental follow-up    /GYN Health:  - Follows with GYN: no.   - Menopause: postmenopausal.     Review of Systems      Objective   /70 (BP Location: Left arm, Patient Position: Sitting, Cuff Size: Standard)   Pulse 104   Temp 98 °F (36.7 °C) (Tympanic)   Resp 18   Ht 5' 1\" (1.549 m)   Wt 118 kg (260 lb)   SpO2 97%   BMI 49.13 kg/m²     Physical Exam  Constitutional:       Appearance: Normal appearance. She is not ill-appearing.   HENT:      Head: Normocephalic and atraumatic.   Eyes:      General: No scleral icterus.        Right eye: No discharge.         Left eye: No discharge.   Cardiovascular:      Rate and Rhythm: Normal rate and regular rhythm.      Heart sounds: No murmur heard.     No friction rub.   Pulmonary:      Effort: Pulmonary effort is normal.      Breath sounds: Normal breath sounds. No wheezing or rales.   Abdominal:      General: Abdomen is flat. There is no distension.      Palpations: Abdomen is soft.      Tenderness: There is no abdominal tenderness.   Musculoskeletal:         General: No swelling, tenderness or deformity.   Skin:     General: Skin is warm and dry.      Findings: No erythema.   Neurological:      Mental Status: She is alert and oriented to person, place, and time. Mental status is at baseline.      Motor: No weakness.   Psychiatric:         Mood and Affect: Mood normal.         Behavior: Behavior normal.         "

## 2024-12-18 ENCOUNTER — TELEPHONE (OUTPATIENT)
Dept: VASCULAR SURGERY | Facility: CLINIC | Age: 59
End: 2024-12-18

## 2024-12-18 NOTE — TELEPHONE ENCOUNTER
Called pt and left a msg. Informed pt we had to change appt from 01/23/25 to 01/30/25 same time .

## 2025-01-08 DIAGNOSIS — I73.9 PAD (PERIPHERAL ARTERY DISEASE) (HCC): ICD-10-CM

## 2025-01-10 RX ORDER — CLOPIDOGREL BISULFATE 75 MG/1
75 TABLET ORAL DAILY
Qty: 90 TABLET | Refills: 1 | Status: SHIPPED | OUTPATIENT
Start: 2025-01-10

## 2025-01-10 RX ORDER — ATORVASTATIN CALCIUM 40 MG/1
40 TABLET, FILM COATED ORAL
Qty: 90 TABLET | Refills: 1 | Status: SHIPPED | OUTPATIENT
Start: 2025-01-10

## 2025-01-30 ENCOUNTER — OFFICE VISIT (OUTPATIENT)
Dept: VASCULAR SURGERY | Facility: CLINIC | Age: 60
End: 2025-01-30
Payer: COMMERCIAL

## 2025-01-30 VITALS
HEART RATE: 91 BPM | OXYGEN SATURATION: 97 % | BODY MASS INDEX: 49.65 KG/M2 | DIASTOLIC BLOOD PRESSURE: 74 MMHG | SYSTOLIC BLOOD PRESSURE: 130 MMHG | WEIGHT: 263 LBS | HEIGHT: 61 IN

## 2025-01-30 DIAGNOSIS — E11.59 TYPE 2 DIABETES MELLITUS WITH OTHER CIRCULATORY COMPLICATION, WITHOUT LONG-TERM CURRENT USE OF INSULIN (HCC): ICD-10-CM

## 2025-01-30 DIAGNOSIS — I74.09 AORTOILIAC OCCLUSIVE DISEASE (HCC): ICD-10-CM

## 2025-01-30 DIAGNOSIS — I74.09 OTHER ARTERIAL EMBOLISM AND THROMBOSIS OF ABDOMINAL AORTA (HCC): ICD-10-CM

## 2025-01-30 DIAGNOSIS — I73.9 PAD (PERIPHERAL ARTERY DISEASE) (HCC): Primary | ICD-10-CM

## 2025-01-30 PROCEDURE — 99214 OFFICE O/P EST MOD 30 MIN: CPT | Performed by: SURGERY

## 2025-01-30 RX ORDER — ASPIRIN 81 MG/1
81 TABLET, CHEWABLE ORAL DAILY
Start: 2025-01-30

## 2025-01-30 NOTE — PROGRESS NOTES
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     Patient's been doing well with no lower extremity complaints.  She is healed her toe wound and has been seeing her podiatrist regularly.    Patient is currently on Plavix and Xarelto however her Xarelto is no longer being covered and is no longer affordable for her.  At this point we are a year out since her procedure.  The thrombus that had embolized is covered with the stent graft.  We can hold off on further anticoagulation and placed on dual antiplatelet therapy.  Will plan to initiate aspirin 81 mg in addition to her Plavix 75 mg.  Continue statin.    Aortic duplex was reviewed which shows patent aortobiiliac stent graft.  Will plan for repeat duplex in 1 year and a follow-up at that time       PAD (peripheral artery disease) (HCC)  Patient again has no complaints of her lower extremities.  She has healed her wounds.  Patient's lower extremity arterial duplex was reviewed which showed the following    RIGHT LOWER LIMB:  There is a patent proximal superficial femoral arterial stent.  Ankle/Brachial index:  0.71 within moderate limits  (Prior 0.72 ) / 82 mmHg /  62 mmHg, within the healing range     LEFT LOWER LIMB:  Ankle/Brachial index: 1.05 within normal limits  (Prior 0.97) / 130 mmHg / 115 mmHg, within the healing range    The findings in the right leg appear  "improved since her last duplex with left leg remaining relatively stable.  This point we will continue surveillance we will plan for repeat duplex in 1 year and a follow-up at that time.  Orders:    aspirin 81 mg chewable tablet; Chew 1 tablet (81 mg total) daily    VAS ARTERIAL DUPLEX- LOWER LIMB BILATERAL; Future    VAS ABDOMINAL AORTA/ILIAC DUPLEX LIMITED; Future    Type 2 diabetes mellitus with other circulatory complication, without long-term current use of insulin ([facility])    Lab Results   Component Value Date    HGBA1C 7.0 (H) 08/28/2024     A1c in August was 7.0.  Reports glucose has been well-controlled.  Will defer to her PCP for management & follow-up           History of Present Illness   HPI  Georgie King is a 60 y.o. adult who presents     Pt here for RR of SHONNA and AOIL 11/19/24. Pt is asym. Prior wounds has healed.   History obtained from: patient    Review of Systems   Constitutional: Negative.    HENT: Negative.     Eyes: Negative.    Respiratory: Negative.     Cardiovascular: Negative.    Gastrointestinal: Negative.    Endocrine: Negative.    Genitourinary: Negative.    Musculoskeletal: Negative.    Skin: Negative.    Allergic/Immunologic: Negative.    Neurological: Negative.    Hematological: Negative.      Medical History Reviewed by provider this encounter:  Tobacco  Allergies  Meds  Problems  Med Hx  Surg Hx  Fam Hx     .     Objective   /74 (BP Location: Right arm, Patient Position: Sitting)   Pulse 91   Ht 5' 1\" (1.549 m)   Wt 119 kg (263 lb)   SpO2 97%   BMI 49.69 kg/m²      Physical Exam  Vitals and nursing note reviewed.   Constitutional:       Appearance: She is well-developed.   HENT:      Head: Normocephalic and atraumatic.   Eyes:      Conjunctiva/sclera: Conjunctivae normal.   Cardiovascular:      Rate and Rhythm: Normal rate and regular rhythm.   Pulmonary:      Effort: Pulmonary effort is normal.      Breath sounds: Normal breath sounds.   Abdominal:      Palpations: " Abdomen is soft.      Tenderness: There is no abdominal tenderness.   Musculoskeletal:      Cervical back: Neck supple.   Skin:     General: Skin is warm and dry.      Capillary Refill: Capillary refill takes less than 2 seconds.   Neurological:      General: No focal deficit present.      Mental Status: She is alert and oriented to person, place, and time.   Psychiatric:         Mood and Affect: Mood normal.         Administrative Statements   I have spent a total time of 35 minutes in caring for this patient on the day of the visit/encounter including Diagnostic results, Prognosis, Risks and benefits of tx options, Instructions for management, Patient and family education, Importance of tx compliance, Risk factor reductions, Impressions, Counseling / Coordination of care, Documenting in the medical record, Reviewing / ordering tests, medicine, procedures  , and Obtaining or reviewing history  .

## 2025-01-30 NOTE — ASSESSMENT & PLAN NOTE
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     Patient's been doing well with no lower extremity complaints.  She is healed her toe wound and has been seeing her podiatrist regularly.    Patient is currently on Plavix and Xarelto however her Xarelto is no longer being covered and is no longer affordable for her.  At this point we are a year out since her procedure.  The thrombus that had embolized is covered with the stent graft.  We can hold off on further anticoagulation and placed on dual antiplatelet therapy.  Will plan to initiate aspirin 81 mg in addition to her Plavix 75 mg.  Continue statin.    Aortic duplex was reviewed which shows patent aortobiiliac stent graft.  Will plan for repeat duplex in 1 year and a follow-up at that time

## 2025-01-30 NOTE — ASSESSMENT & PLAN NOTE
Lab Results   Component Value Date    HGBA1C 7.0 (H) 08/28/2024     A1c in August was 7.0.  Reports glucose has been well-controlled.  Will defer to her PCP for management & follow-up

## 2025-01-30 NOTE — ASSESSMENT & PLAN NOTE
Patient again has no complaints of her lower extremities.  She has healed her wounds.  Patient's lower extremity arterial duplex was reviewed which showed the following    RIGHT LOWER LIMB:  There is a patent proximal superficial femoral arterial stent.  Ankle/Brachial index:  0.71 within moderate limits  (Prior 0.72 ) / 82 mmHg /  62 mmHg, within the healing range     LEFT LOWER LIMB:  Ankle/Brachial index: 1.05 within normal limits  (Prior 0.97) / 130 mmHg / 115 mmHg, within the healing range    The findings in the right leg appear improved since her last duplex with left leg remaining relatively stable.  This point we will continue surveillance we will plan for repeat duplex in 1 year and a follow-up at that time.  Orders:    aspirin 81 mg chewable tablet; Chew 1 tablet (81 mg total) daily    VAS ARTERIAL DUPLEX- LOWER LIMB BILATERAL; Future    VAS ABDOMINAL AORTA/ILIAC DUPLEX LIMITED; Future

## 2025-02-19 ENCOUNTER — TELEPHONE (OUTPATIENT)
Dept: INTERNAL MEDICINE CLINIC | Facility: CLINIC | Age: 60
End: 2025-02-19

## 2025-02-19 NOTE — TELEPHONE ENCOUNTER
Left message for patient to call office to reschedule 3/17 appointment. Physician will be out of office.

## 2025-03-20 ENCOUNTER — VBI (OUTPATIENT)
Dept: ADMINISTRATIVE | Facility: OTHER | Age: 60
End: 2025-03-20

## 2025-03-20 NOTE — TELEPHONE ENCOUNTER
03/20/25 11:25 AM     Chart reviewed for   Cervical Cancer Screening    ; nothing is submitted to the patient's insurance at this time.     DAVID FREIRE MA   [facility]

## 2025-03-21 LAB
ALBUMIN SERPL-MCNC: 4.2 G/DL (ref 3.6–5.1)
ALBUMIN/CREAT UR: 70 MG/G CREAT
ALBUMIN/GLOB SERPL: 1.4 (CALC) (ref 1–2.5)
ALP SERPL-CCNC: 112 U/L (ref 37–153)
ALT SERPL-CCNC: 21 U/L (ref 6–29)
AST SERPL-CCNC: 14 U/L (ref 10–35)
BASOPHILS # BLD AUTO: 104 CELLS/UL (ref 0–200)
BASOPHILS NFR BLD AUTO: 0.7 %
BILIRUB SERPL-MCNC: 0.3 MG/DL (ref 0.2–1.2)
BUN SERPL-MCNC: 15 MG/DL (ref 7–25)
BUN/CREAT SERPL: ABNORMAL (CALC) (ref 6–22)
CALCIUM SERPL-MCNC: 9.5 MG/DL (ref 8.6–10.4)
CHLORIDE SERPL-SCNC: 104 MMOL/L (ref 98–110)
CHOLEST SERPL-MCNC: 117 MG/DL
CHOLEST/HDLC SERPL: 2.2 (CALC)
CO2 SERPL-SCNC: 27 MMOL/L (ref 20–32)
CREAT SERPL-MCNC: 0.75 MG/DL (ref 0.5–1.05)
CREAT UR-MCNC: 23 MG/DL (ref 20–275)
EOSINOPHIL # BLD AUTO: 503 CELLS/UL (ref 15–500)
EOSINOPHIL NFR BLD AUTO: 3.4 %
ERYTHROCYTE [DISTWIDTH] IN BLOOD BY AUTOMATED COUNT: 13.1 % (ref 11–15)
GFR/BSA.PRED SERPLBLD CYS-BASED-ARV: 91 ML/MIN/1.73M2
GLOBULIN SER CALC-MCNC: 3 G/DL (CALC) (ref 1.9–3.7)
GLUCOSE SERPL-MCNC: 131 MG/DL (ref 65–99)
HBA1C MFR BLD: 7.3 % OF TOTAL HGB
HCT VFR BLD AUTO: 35.9 % (ref 35–45)
HDLC SERPL-MCNC: 54 MG/DL
HGB BLD-MCNC: 11.1 G/DL (ref 11.7–15.5)
IRON SATN MFR SERPL: 11 % (CALC) (ref 16–45)
IRON SERPL-MCNC: 38 MCG/DL (ref 45–160)
LDLC SERPL CALC-MCNC: 42 MG/DL (CALC)
LYMPHOCYTES # BLD AUTO: 4558 CELLS/UL (ref 850–3900)
LYMPHOCYTES NFR BLD AUTO: 30.8 %
MCH RBC QN AUTO: 25.3 PG (ref 27–33)
MCHC RBC AUTO-ENTMCNC: 30.9 G/DL (ref 32–36)
MCV RBC AUTO: 82 FL (ref 80–100)
MICROALBUMIN UR-MCNC: 1.6 MG/DL
MONOCYTES # BLD AUTO: 947 CELLS/UL (ref 200–950)
MONOCYTES NFR BLD AUTO: 6.4 %
NEUTROPHILS # BLD AUTO: 8688 CELLS/UL (ref 1500–7800)
NEUTROPHILS NFR BLD AUTO: 58.7 %
NONHDLC SERPL-MCNC: 63 MG/DL (CALC)
PLATELET # BLD AUTO: 526 THOUSAND/UL (ref 140–400)
PMV BLD REES-ECKER: 10 FL (ref 7.5–12.5)
POTASSIUM SERPL-SCNC: 5.5 MMOL/L (ref 3.5–5.3)
PROT SERPL-MCNC: 7.2 G/DL (ref 6.1–8.1)
RBC # BLD AUTO: 4.38 MILLION/UL (ref 3.8–5.1)
SODIUM SERPL-SCNC: 139 MMOL/L (ref 135–146)
TIBC SERPL-MCNC: 345 MCG/DL (CALC) (ref 250–450)
TRIGL SERPL-MCNC: 133 MG/DL
WBC # BLD AUTO: 14.8 THOUSAND/UL (ref 3.8–10.8)

## 2025-03-22 ENCOUNTER — RESULTS FOLLOW-UP (OUTPATIENT)
Dept: INTERNAL MEDICINE CLINIC | Facility: CLINIC | Age: 60
End: 2025-03-22

## 2025-03-22 DIAGNOSIS — E87.5 HYPERKALEMIA: Primary | ICD-10-CM

## 2025-03-24 ENCOUNTER — OFFICE VISIT (OUTPATIENT)
Dept: INTERNAL MEDICINE CLINIC | Facility: CLINIC | Age: 60
End: 2025-03-24
Payer: COMMERCIAL

## 2025-03-24 VITALS
HEART RATE: 100 BPM | OXYGEN SATURATION: 97 % | TEMPERATURE: 98.1 F | RESPIRATION RATE: 18 BRPM | BODY MASS INDEX: 49.47 KG/M2 | HEIGHT: 61 IN | WEIGHT: 262 LBS | SYSTOLIC BLOOD PRESSURE: 160 MMHG | DIASTOLIC BLOOD PRESSURE: 80 MMHG

## 2025-03-24 DIAGNOSIS — D50.9 IRON DEFICIENCY ANEMIA, UNSPECIFIED IRON DEFICIENCY ANEMIA TYPE: ICD-10-CM

## 2025-03-24 DIAGNOSIS — Z12.31 ENCOUNTER FOR SCREENING MAMMOGRAM FOR BREAST CANCER: ICD-10-CM

## 2025-03-24 DIAGNOSIS — E11.51 DIABETES MELLITUS TYPE 2 WITH PERIPHERAL ARTERY DISEASE (HCC): ICD-10-CM

## 2025-03-24 DIAGNOSIS — E87.5 HYPERKALEMIA: ICD-10-CM

## 2025-03-24 DIAGNOSIS — E78.5 DYSLIPIDEMIA: ICD-10-CM

## 2025-03-24 DIAGNOSIS — I10 PRIMARY HYPERTENSION: ICD-10-CM

## 2025-03-24 DIAGNOSIS — I74.09 AORTOILIAC OCCLUSIVE DISEASE (HCC): ICD-10-CM

## 2025-03-24 DIAGNOSIS — E11.59 TYPE 2 DIABETES MELLITUS WITH OTHER CIRCULATORY COMPLICATION, WITHOUT LONG-TERM CURRENT USE OF INSULIN (HCC): Primary | ICD-10-CM

## 2025-03-24 LAB — SL AMB POCT HEMOGLOBIN AIC: 7.5 (ref ?–6.5)

## 2025-03-24 PROCEDURE — 83036 HEMOGLOBIN GLYCOSYLATED A1C: CPT | Performed by: INTERNAL MEDICINE

## 2025-03-24 PROCEDURE — 99214 OFFICE O/P EST MOD 30 MIN: CPT | Performed by: INTERNAL MEDICINE

## 2025-03-24 NOTE — ASSESSMENT & PLAN NOTE
Lab Results   Component Value Date    HGBA1C 7.5 (A) 03/24/2025     Current medications: Metformin, glimepiride  Statin: Yes  Albuminuria/ACEi/ARB: No significant microalbuminuria  Retinopathy: Due for eye exam     A1c near goal  Continue current regimen      Orders:  •  Hemoglobin A1C; Future  •  CBC and differential; Future  •  POCT hemoglobin A1c

## 2025-03-24 NOTE — PROGRESS NOTES
Name: Georgie King      : 1965      MRN: [id number]  Encounter Provider: Bassem Barton DO  Encounter Date: 3/24/2025   Encounter department: [facility] INTERNAL MEDICINE [place]  :  Medical history of type 2 diabetes, hypertension, dyslipidemia, peripheral arterial disease, nicotine dependence   Assessment & Plan  Type 2 diabetes mellitus with other circulatory complication, without long-term current use of insulin (HCC)    Lab Results   Component Value Date    HGBA1C 7.5 (A) 2025     Current medications: Metformin, glimepiride  Statin: Yes  Albuminuria/ACEi/ARB: No significant microalbuminuria  Retinopathy: Due for eye exam     A1c near goal  Continue current regimen      Orders:  •  Hemoglobin A1C; Future  •  CBC and differential; Future  •  POCT hemoglobin A1c    Encounter for screening mammogram for breast cancer         Aortoiliac occlusive disease (HCC)  Admission 2024 for distal embolism of the right toe, necrotic appearing right toe.  Status post EVAR/right lower extremity thrombectomy/right SFA stent placement with angioplasty PT/AT  -Status post right hallux amputation 2024    Transitioned off anticoagulation by vascular surgery to DAPT with aspirin and clopidogrel  -Continue statin  -Continue follow-up with vascular surgery, podiatry as directed       Dyslipidemia  Well-controlled  Continue atorvastatin       Iron deficiency anemia, unspecified iron deficiency anemia type  Stable, mild anemia with evidence of ongoing iron deficiency in spite of supplementation with ferrous sulfate..  Normocytic.  Associated leukocytosis and thrombocytosis.  Possibly reactive thrombocytosis?  Elevated neutrophil count possibly related to tobacco use.  She is not having any reported blood in her stool    Again suggested endoscopic evaluation to look for source of anemia and iron deficiency.  Georgie continues to defer for today.  She does not seem to be interested in IV iron  "infusions    Plan:  -After discussion, we will continue to monitor.  Continue iron supplementation.  Recheck CBC and iron levels prior to follow-up  -If no significant improvement at next visit, would consider IV iron infusions  -Will continue to address need for EGD/colonoscopy  -Continue to monitor leukocytosis/thrombocytosis      Orders:  •  TIBC Panel (incl. Iron, TIBC, % Iron Saturation); Future  •  Ferritin; Future    Hyperkalemia  Recheck BMP in 1 to 2 weeks  Orders:  •  Basic metabolic panel; Future    Primary hypertension  Elevated today.  Georgie states she did not take her blood pressure meds this morning  Discussed importance of medication compliance  Continue amlodipine, lisinopril for now       Diabetes mellitus type 2 with peripheral artery disease (HCC)    Lab Results   Component Value Date    HGBA1C 7.5 (A) 03/24/2025     Current medications: Metformin, glimepiride  Statin: Yes  Albuminuria/ACEi/ARB: No significant microalbuminuria  Retinopathy: Due for eye exam     A1c near goal   Continue current regimen  Recommend diabetic eye exam for retinopathy screening              History of Present Illness   HPI  Review of Systems    Objective   /80 (BP Location: Left arm, Patient Position: Sitting, Cuff Size: Standard)   Pulse 100   Temp 98.1 °F (36.7 °C) (Temporal)   Resp 18   Ht 5' 1\" (1.549 m)   Wt 119 kg (262 lb)   SpO2 97%   BMI 49.50 kg/m²      Physical Exam  Cardiovascular:      Pulses: Pulses are weak.           Dorsalis pedis pulses are 1+ on the right side and 1+ on the left side.        Posterior tibial pulses are 1+ on the right side and 1+ on the left side.   Feet:      Right foot: amputated     Skin integrity: No ulcer, skin breakdown, erythema, warmth, callus or dry skin.      Left foot:      Skin integrity: No ulcer, skin breakdown, erythema, warmth, callus or dry skin.           Diabetic Foot Exam    Patient's shoes and socks removed.    Right Foot/Ankle   Right Foot " Inspection  Skin Exam: skin normal and skin intact. No dry skin, no warmth, no callus, no erythema, no maceration, no abnormal color, no pre-ulcer, no ulcer and no callus. Amputation: amputation right foot (Comments: s/p right hallux amputation)    Toe Exam: ROM and strength within normal limits.     Sensory   Vibration: intact  Monofilament testing: intact    Vascular  The right DP pulse is 1+. The right PT pulse is 1+.     Left Foot/Ankle  Left Foot Inspection  Skin Exam: skin normal and skin intact. No dry skin, no warmth, no erythema, no maceration, normal color, no pre-ulcer, no ulcer and no callus.     Toe Exam: ROM and strength within normal limits.     Sensory   Vibration: intact  Monofilament testing: intact    Vascular  The left DP pulse is 1+. The left PT pulse is 1+.     Assign Risk Category  Deformity present  No loss of protective sensation  Weak pulses  Risk: 2

## 2025-03-24 NOTE — ASSESSMENT & PLAN NOTE
Admission February 2024 for distal embolism of the right toe, necrotic appearing right toe.  Status post EVAR/right lower extremity thrombectomy/right SFA stent placement with angioplasty PT/AT  -Status post right hallux amputation April 2024    Transitioned off anticoagulation by vascular surgery to DAPT with aspirin and clopidogrel  -Continue statin  -Continue follow-up with vascular surgery, podiatry as directed

## 2025-03-24 NOTE — ASSESSMENT & PLAN NOTE
Stable, mild anemia with evidence of ongoing iron deficiency in spite of supplementation with ferrous sulfate..  Normocytic.  Associated leukocytosis and thrombocytosis.  Possibly reactive thrombocytosis?  Elevated neutrophil count possibly related to tobacco use.  She is not having any reported blood in her stool    Again suggested endoscopic evaluation to look for source of anemia and iron deficiency.  Georgie continues to defer for today.  She does not seem to be interested in IV iron infusions    Plan:  -After discussion, we will continue to monitor.  Continue iron supplementation.  Recheck CBC and iron levels prior to follow-up  -If no significant improvement at next visit, would consider IV iron infusions  -Will continue to address need for EGD/colonoscopy  -Continue to monitor leukocytosis/thrombocytosis      Orders:  •  TIBC Panel (incl. Iron, TIBC, % Iron Saturation); Future  •  Ferritin; Future

## 2025-03-24 NOTE — PATIENT INSTRUCTIONS
Please go to the lab in 1 to 2 weeks to recheck your potassium level    You can return to the lab again a few days prior to our next appointment in 3 months to check the remainder of your routine labs

## 2025-03-25 NOTE — ASSESSMENT & PLAN NOTE
Lab Results   Component Value Date    HGBA1C 7.5 (A) 03/24/2025     Current medications: Metformin, glimepiride  Statin: Yes  Albuminuria/ACEi/ARB: No significant microalbuminuria  Retinopathy: Due for eye exam     A1c near goal   Continue current regimen  Recommend diabetic eye exam for retinopathy screening

## 2025-03-25 NOTE — ASSESSMENT & PLAN NOTE
Elevated today.  Georgie states she did not take her blood pressure meds this morning  Discussed importance of medication compliance  Continue amlodipine, lisinopril for now

## 2025-04-02 LAB
LEFT EYE DIABETIC RETINOPATHY: NORMAL
RIGHT EYE DIABETIC RETINOPATHY: NORMAL

## 2025-04-23 DIAGNOSIS — I10 HTN (HYPERTENSION): ICD-10-CM

## 2025-04-23 RX ORDER — AMLODIPINE BESYLATE 5 MG/1
5 TABLET ORAL DAILY
Qty: 100 TABLET | Refills: 1 | Status: SHIPPED | OUTPATIENT
Start: 2025-04-23

## 2025-05-05 ENCOUNTER — PROCEDURE VISIT (OUTPATIENT)
Dept: PODIATRY | Facility: CLINIC | Age: 60
End: 2025-05-05
Payer: COMMERCIAL

## 2025-05-05 VITALS — OXYGEN SATURATION: 98 % | HEIGHT: 61 IN | HEART RATE: 100 BPM | BODY MASS INDEX: 49.47 KG/M2 | WEIGHT: 262 LBS

## 2025-05-05 DIAGNOSIS — E11.42 DIABETIC POLYNEUROPATHY ASSOCIATED WITH TYPE 2 DIABETES MELLITUS (HCC): ICD-10-CM

## 2025-05-05 DIAGNOSIS — Z89.411 HISTORY OF AMPUTATION OF RIGHT GREAT TOE (HCC): ICD-10-CM

## 2025-05-05 DIAGNOSIS — G54.6 PHANTOM PAIN AFTER AMPUTATION OF LOWER EXTREMITY (HCC): ICD-10-CM

## 2025-05-05 DIAGNOSIS — B35.1 ONYCHOMYCOSIS: Primary | ICD-10-CM

## 2025-05-05 PROCEDURE — 11721 DEBRIDE NAIL 6 OR MORE: CPT | Performed by: PODIATRIST

## 2025-05-05 NOTE — LETTER
May 5, 2025     Patient: Georgie King  YOB: 1965  Date of Visit: 5/5/2025      To Whom it May Concern:    Georgie King is under my professional care. Georgie was seen in my office on 5/5/2025. Georgie is still having pain in her right foot that occurs with prolonged periods of weightbearing. She is able to work 4 hour shifts, with seating as needed, for another 6 months.     If you have any questions or concerns, please don't hesitate to call.         Sincerely,          Beni Veliz DPM        CC: No Recipients

## 2025-05-05 NOTE — PROGRESS NOTES
:  Assessment & Plan  Onychomycosis         History of amputation of right great toe (HCC)         Diabetic polyneuropathy associated with type 2 diabetes mellitus ([facility])    Lab Results   Component Value Date    HGBA1C 7.5 (A) 03/24/2025            Phantom pain after amputation of lower extremity ([facility])           The patient's clinical examination today is significant for brittle, thickened and discolored with dystrophy consistent with onychomycosis x 9.  The patient still notes generalized tenderness to the right lower extremity after standing for several hours at work.  She would like to extend her work hours to 4 hours per shift.  Pedal pulses palpable.  There are no open lesions noted to either lower extremity.     The pedal nail plates were sharply debrided with a sterile nail clipper x 9 without complication.  The nails of the  reduce the thickness and girth utilizing rotary bur without complication.  She continues to do well with the current dose of gabapentin.  Her most recent hemoglobin A1c from March 2025 was 7.5, prior to that it was 7.0 in August 2024.     A work note was provided to the patient today to increase to 4-hour shifts for the next 6 months.  There are no other acute pedal issues noted today.  Follow-up in 3-4 months, sooner as needed.    History of Present Illness      The patient presents today for follow-up at risk diabetic footcare.  She notes no new issues since her last visit.  She would like to increase her work hours to 4 hours per shift instead of 3.  She does still note tenderness of the amputation site when standing for prolonged periods of time.  She does note that work has allowed her to sit on an as-needed basis.      PAST MEDICAL HISTORY:  Past Medical History:   Diagnosis Date    Acute ischemia of Right great toe 02/21/2024    Dyslipidemia 02/21/2024    PAD (peripheral artery disease) ([facility]) 02/19/2024    Tobacco abuse 02/19/2024    Type 2 diabetes mellitus, without  long-term current use of insulin (HCC) 02/19/2024       PAST SURGICAL HISTORY:  Past Surgical History:   Procedure Laterality Date    APPENDECTOMY      IR LOWER EXTREMITY ANGIOGRAM  2/21/2024    IR LOWER EXTREMITY ANGIOGRAM  2/21/2024    THROMBECTOMY W/ EMBOLECTOMY Right 2/21/2024    Procedure: EMBOLECTOMY/THROMBECTOMY LOWER EXTREMITY Right;  Surgeon: Linwood Hayes MD;  Location: [facility];  Service: Vascular    TOE AMPUTATION Right 4/2/2024    Procedure: AMPUTATION great TOE;  Surgeon: Beni Veliz DPM;  Location: [facility];  Service: Podiatry        ALLERGIES:  Patient has no known allergies.    MEDICATIONS:  Current Outpatient Medications   Medication Sig Dispense Refill    acetaminophen (TYLENOL) 325 mg tablet Take 2 tablets (650 mg total) by mouth every 4 (four) hours as needed for mild pain for up to 30 doses 30 tablet 0    Alcohol Swabs 70 % PADS May substitute brand based on insurance coverage. Check glucose ACHS. 200 each 0    amLODIPine (NORVASC) 5 mg tablet TAKE 1 TABLET (5 MG TOTAL) BY MOUTH DAILY. 100 tablet 1    aspirin 81 mg chewable tablet Chew 1 tablet (81 mg total) daily      atorvastatin (LIPITOR) 40 mg tablet TAKE 1 TABLET BY MOUTH DAILY WITH DINNER 90 tablet 1    clopidogrel (PLAVIX) 75 mg tablet TAKE 1 TABLET BY MOUTH EVERY DAY 90 tablet 1    ferrous sulfate 324 (65 Fe) mg TAKE 1 TABLET (324 MG TOTAL) BY MOUTH DAILY BEFORE BREAKFAST 90 tablet 1    gabapentin (NEURONTIN) 100 mg capsule Take 1 capsule (100 mg total) by mouth daily at bedtime 30 capsule 5    glimepiride (AMARYL) 2 mg tablet Take 1 tablet (2 mg total) by mouth daily with breakfast 100 tablet 1    ibuprofen (MOTRIN) 600 mg tablet Take 1 tablet (600 mg total) by mouth every 6 (six) hours as needed for mild pain for up to 30 doses 30 tablet 0    lisinopril (ZESTRIL) 10 mg tablet Take 1 tablet (10 mg total) by mouth daily 100 tablet 1    metFORMIN (GLUCOPHAGE-XR) 750 mg 24 hr tablet TAKE 1 TABLET BY MOUTH 2 TIMES A DAY  WITH MEALS. 180 tablet 1    traZODone (DESYREL) 50 mg tablet TAKE 1 TABLET (50 MG TOTAL) BY MOUTH DAILY AT BEDTIME AS NEEDED FOR SLEEP 90 tablet 1    Continuous Glucose Sensor (Dexcom G7 Sensor) Use 1 Device every 10 days (Patient not taking: Reported on 3/24/2025) 9 each 3     No current facility-administered medications for this visit.       SOCIAL HISTORY:  Social History     Socioeconomic History    Marital status: /Civil Union     Spouse name: None    Number of children: None    Years of education: None    Highest education level: None   Occupational History    None   Tobacco Use    Smoking status: Former     Current packs/day: 0.50     Types: Cigarettes    Smokeless tobacco: Never   Vaping Use    Vaping status: Never Used   Substance and Sexual Activity    Alcohol use: Not Currently     Comment: occ    Drug use: Never    Sexual activity: Yes     Partners: Male   Other Topics Concern    None   Social History Narrative    None     Social Drivers of Health     Financial Resource Strain: Not on file   Food Insecurity: No Food Insecurity (2/23/2024)    Nursing - Inadequate Food Risk Classification     Worried About Running Out of Food in the Last Year: Never true     Ran Out of Food in the Last Year: Never true     Ran Out of Food in the Last Year: Not on file   Transportation Needs: No Transportation Needs (2/23/2024)    PRAPARE - Transportation     Lack of Transportation (Medical): No     Lack of Transportation (Non-Medical): No   Physical Activity: Not on file   Stress: Not on file   Social Connections: Not on file   Intimate Partner Violence: Not on file   Housing Stability: Low Risk  (2/23/2024)    Housing Stability Vital Sign     Unable to Pay for Housing in the Last Year: No     Number of Times Moved in the Last Year: 1     Homeless in the Last Year: No      Review of Systems   Constitutional: Negative.    Eyes: Negative.    Respiratory:  Negative for shortness of breath.    Cardiovascular:  Negative  "for chest pain and leg swelling.   Musculoskeletal: Negative.    Skin: Negative.    Psychiatric/Behavioral: Negative.       Objective   Pulse 100   Ht 5' 1\" (1.549 m)   Wt 119 kg (262 lb)   SpO2 98%   BMI 49.50 kg/m²      Physical Exam  Vitals and nursing note reviewed.   Constitutional:       General: She is not in acute distress.     Appearance: She is well-developed.   HENT:      Head: Normocephalic and atraumatic.   Eyes:      Conjunctiva/sclera: Conjunctivae normal.   Pulmonary:      Effort: Pulmonary effort is normal.   Abdominal:      Palpations: Abdomen is soft.   Skin:     General: Skin is warm and dry.      Capillary Refill: Capillary refill takes 2 to 3 seconds.   Neurological:      General: No focal deficit present.      Mental Status: She is alert and oriented to person, place, and time.   Psychiatric:         Mood and Affect: Mood normal.           "

## 2025-05-08 DIAGNOSIS — E11.9 TYPE 2 DIABETES MELLITUS, WITHOUT LONG-TERM CURRENT USE OF INSULIN (HCC): ICD-10-CM

## 2025-05-08 RX ORDER — METFORMIN HYDROCHLORIDE 750 MG/1
TABLET, EXTENDED RELEASE ORAL
Qty: 180 TABLET | Refills: 1 | Status: SHIPPED | OUTPATIENT
Start: 2025-05-08

## 2025-05-09 DIAGNOSIS — F51.01 PRIMARY INSOMNIA: ICD-10-CM

## 2025-05-09 RX ORDER — TRAZODONE HYDROCHLORIDE 50 MG/1
50 TABLET ORAL
Qty: 90 TABLET | Refills: 1 | Status: SHIPPED | OUTPATIENT
Start: 2025-05-09

## 2025-06-03 ENCOUNTER — VBI (OUTPATIENT)
Dept: ADMINISTRATIVE | Facility: OTHER | Age: 60
End: 2025-06-03

## 2025-06-03 DIAGNOSIS — D50.9 IRON DEFICIENCY ANEMIA, UNSPECIFIED IRON DEFICIENCY ANEMIA TYPE: ICD-10-CM

## 2025-06-03 RX ORDER — FERROUS SULFATE 324(65)MG
324 TABLET, DELAYED RELEASE (ENTERIC COATED) ORAL
Qty: 90 TABLET | Refills: 1 | Status: SHIPPED | OUTPATIENT
Start: 2025-06-03

## 2025-06-03 NOTE — TELEPHONE ENCOUNTER
06/03/25 11:32 AM     Chart reviewed for Mammogram ; nothing is submitted to the patient's insurance at this time.     María Elena Quintanilla MA   [facility]

## 2025-06-11 ENCOUNTER — VBI (OUTPATIENT)
Dept: ADMINISTRATIVE | Facility: OTHER | Age: 60
End: 2025-06-11

## 2025-06-11 NOTE — TELEPHONE ENCOUNTER
06/11/25 7:17 AM     Chart reviewed for   Comprehensive Diabetes Care - Eye Exam    ; nothing is submitted to the patient's insurance at this time.     DAVID FREIRE MA   [facility]   Reviewed labs he brought in   Scanned  Check u/a  Refused the flu shot

## 2025-06-13 ENCOUNTER — RA CDI HCC (OUTPATIENT)
Dept: OTHER | Facility: HOSPITAL | Age: 60
End: 2025-06-13

## 2025-06-13 NOTE — PROGRESS NOTES
Please review if this dx is applicable to the patient's condition and assess and document, if applicable in next visit        [id number]    HCC coding opportunities          Chart Reviewed number of suggestions sent to Provider: 1     Patients Insurance        Commercial Insurance: [employer]

## 2025-06-24 LAB
BASOPHILS # BLD AUTO: 95 CELLS/UL (ref 0–200)
BASOPHILS NFR BLD AUTO: 0.7 %
BUN SERPL-MCNC: 17 MG/DL (ref 7–25)
BUN/CREAT SERPL: ABNORMAL (CALC) (ref 6–22)
CALCIUM SERPL-MCNC: 9.5 MG/DL (ref 8.6–10.4)
CHLORIDE SERPL-SCNC: 106 MMOL/L (ref 98–110)
CO2 SERPL-SCNC: 24 MMOL/L (ref 20–32)
CREAT SERPL-MCNC: 0.78 MG/DL (ref 0.5–1.05)
EOSINOPHIL # BLD AUTO: 218 CELLS/UL (ref 15–500)
EOSINOPHIL NFR BLD AUTO: 1.6 %
ERYTHROCYTE [DISTWIDTH] IN BLOOD BY AUTOMATED COUNT: 13.6 % (ref 11–15)
FERRITIN SERPL-MCNC: 30 NG/ML (ref 16–288)
GFR/BSA.PRED SERPLBLD CYS-BASED-ARV: 86 ML/MIN/1.73M2
GLUCOSE SERPL-MCNC: 124 MG/DL (ref 65–99)
HBA1C MFR BLD: 7.4 %
HCT VFR BLD AUTO: 36.8 % (ref 35–45)
HGB BLD-MCNC: 11.5 G/DL (ref 11.7–15.5)
IRON SATN MFR SERPL: 19 % (CALC) (ref 16–45)
IRON SERPL-MCNC: 59 MCG/DL (ref 45–160)
LYMPHOCYTES # BLD AUTO: 4447 CELLS/UL (ref 850–3900)
LYMPHOCYTES NFR BLD AUTO: 32.7 %
MCH RBC QN AUTO: 25.5 PG (ref 27–33)
MCHC RBC AUTO-ENTMCNC: 31.3 G/DL (ref 32–36)
MCV RBC AUTO: 81.6 FL (ref 80–100)
MONOCYTES # BLD AUTO: 843 CELLS/UL (ref 200–950)
MONOCYTES NFR BLD AUTO: 6.2 %
NEUTROPHILS # BLD AUTO: 7997 CELLS/UL (ref 1500–7800)
NEUTROPHILS NFR BLD AUTO: 58.8 %
PLATELET # BLD AUTO: 477 THOUSAND/UL (ref 140–400)
PMV BLD REES-ECKER: 10 FL (ref 7.5–12.5)
POTASSIUM SERPL-SCNC: 4.9 MMOL/L (ref 3.5–5.3)
RBC # BLD AUTO: 4.51 MILLION/UL (ref 3.8–5.1)
SODIUM SERPL-SCNC: 139 MMOL/L (ref 135–146)
TIBC SERPL-MCNC: 307 MCG/DL (CALC) (ref 250–450)
WBC # BLD AUTO: 13.6 THOUSAND/UL (ref 3.8–10.8)

## 2025-06-30 ENCOUNTER — OFFICE VISIT (OUTPATIENT)
Dept: INTERNAL MEDICINE CLINIC | Facility: CLINIC | Age: 60
End: 2025-06-30
Payer: COMMERCIAL

## 2025-06-30 VITALS
BODY MASS INDEX: 49.5 KG/M2 | SYSTOLIC BLOOD PRESSURE: 140 MMHG | DIASTOLIC BLOOD PRESSURE: 62 MMHG | RESPIRATION RATE: 18 BRPM | HEART RATE: 109 BPM | HEIGHT: 61 IN

## 2025-06-30 DIAGNOSIS — Z11.4 SCREENING FOR HIV (HUMAN IMMUNODEFICIENCY VIRUS): ICD-10-CM

## 2025-06-30 DIAGNOSIS — I74.09 AORTOILIAC OCCLUSIVE DISEASE (HCC): ICD-10-CM

## 2025-06-30 DIAGNOSIS — Z72.0 TOBACCO ABUSE: ICD-10-CM

## 2025-06-30 DIAGNOSIS — Z12.31 ENCOUNTER FOR SCREENING MAMMOGRAM FOR BREAST CANCER: ICD-10-CM

## 2025-06-30 DIAGNOSIS — E11.42 DIABETIC POLYNEUROPATHY ASSOCIATED WITH TYPE 2 DIABETES MELLITUS (HCC): ICD-10-CM

## 2025-06-30 DIAGNOSIS — I10 PRIMARY HYPERTENSION: ICD-10-CM

## 2025-06-30 DIAGNOSIS — D50.9 IRON DEFICIENCY ANEMIA, UNSPECIFIED IRON DEFICIENCY ANEMIA TYPE: ICD-10-CM

## 2025-06-30 DIAGNOSIS — Z11.59 NEED FOR HEPATITIS C SCREENING TEST: ICD-10-CM

## 2025-06-30 DIAGNOSIS — F51.01 PRIMARY INSOMNIA: ICD-10-CM

## 2025-06-30 DIAGNOSIS — I10 HTN (HYPERTENSION): ICD-10-CM

## 2025-06-30 DIAGNOSIS — E11.59 TYPE 2 DIABETES MELLITUS WITH OTHER CIRCULATORY COMPLICATION, WITHOUT LONG-TERM CURRENT USE OF INSULIN (HCC): Primary | ICD-10-CM

## 2025-06-30 PROCEDURE — 99214 OFFICE O/P EST MOD 30 MIN: CPT | Performed by: INTERNAL MEDICINE

## 2025-06-30 RX ORDER — LISINOPRIL 20 MG/1
20 TABLET ORAL DAILY
Qty: 100 TABLET | Refills: 1 | Status: SHIPPED | OUTPATIENT
Start: 2025-06-30

## 2025-06-30 NOTE — PROGRESS NOTES
Name: Georgie King      : 1965      MRN: [id number]  Encounter Provider: Bassem Barton DO  Encounter Date: 2025   Encounter department: [facility] INTER[facility] MEDICINE [place]  :  Medical history of type 2 diabetes, hypertension, dyslipidemia, peripheral arterial disease, nicotine dependence   Assessment & Plan  Need for hepatitis C screening test    Orders:  •  Hepatitis C Antibody; Future    Diabetic polyneuropathy associated with type 2 diabetes mellitus (HCC)    Lab Results   Component Value Date    HGBA1C 7.4 (H) 2025            Screening for HIV (human immunodeficiency virus)    Orders:  •  HIV 1/2 AG/AB w Reflex [facility] for 2 yr old and above; Future    Encounter for screening mammogram for breast cancer    Orders:  •  Mammo screening bilateral w 3d and cad; Future    Aortoiliac occlusive disease (HCC)  Admission 2024 for distal embolism of the right toe, necrotic appearing right toe.  Status post EVAR/right lower extremity thrombectomy/right SFA stent placement with angioplasty PT/AT  -Status post right hallux amputation 2024    Transitioned off anticoagulation by vascular surgery to DAPT with aspirin and clopidogrel  -Continue statin  -Continue follow-up with vascular surgery, podiatry as directed          Primary hypertension  Elevated today.  Georgie states she did not take her blood pressure meds this morning  Discussed importance of medication compliance  Continue amlodipine, lisinopril for now          Type 2 diabetes mellitus with other circulatory complication, without long-term current use of insulin (HCC)    Lab Results   Component Value Date    HGBA1C 7.4 (H) 2025     Current medications: Metformin, glimepiride  Statin: Yes  Albuminuria/ACEi/ARB: No significant microalbuminuria  Retinopathy: Up-to-date, no evidence of retinopathy on recent exam    A1c remains just above goal  Continue current regimen for now      Orders:  •  CBC and differential; Future  •   "Hemoglobin A1C; Future  •  Basic metabolic panel; Future     Iron deficiency anemia, unspecified iron deficiency anemia type  Stable, mild anemia.  Possibly related to iron deficiency.  Ferritin level remains stable on most recent labs at 30.  She continues with oral iron supplement.    Associated leukocytosis and thrombocytosis.  Possibly reactive thrombocytosis?  Elevated neutrophil count possibly related to tobacco use    Patient has deferred endoscopic evaluation for now.  Will continue to address    Plan:  - Continue to monitor for now.  Continue iron supplementation  -Will continue to address need for EGD/colonoscopy  -Continue to monitor leukocytosis/thrombocytosis  -Most recent lymphocyte count stable just around 4500.  If she continues with persistent lymphocytosis or this progresses, can also consider flow cytometry       Orders:  •  Sedimentation rate, automated; Future  •  C-reactive protein; Future  •  Peripheral Smear; Future  •  Iron Panel (Includes Ferritin, Iron Sat%, Iron, and TIBC); Future     Primary insomnia  Stable  Continue trazodone        Tobacco abuse  Patient states that she has cut back to using approximately 4 cigarettes daily  Ongoing cessation encouraged        HTN (hypertension)  140/62 today, 150/76 on my recheck.  Will increase lisinopril to 20 mg daily.  Continue with amlodipine 5 mg daily    Reassess at follow-up    Orders:  •  lisinopril (ZESTRIL) 20 mg tablet; Take 1 tablet (20 mg total) by mouth daily            History of Present Illness   HPI  Review of Systems    Objective   /62 (BP Location: Left arm, Patient Position: Sitting, Cuff Size: Standard)   Pulse (!) 109   Resp 18   Ht 5' 1\" (1.549 m)   BMI 49.50 kg/m²      Physical Exam    Cardiovascular:      Rate and Rhythm: Normal rate and regular rhythm.      Heart sounds: Normal heart sounds. No murmur heard.  Pulmonary:      Effort: Pulmonary effort is normal.      Breath sounds: Normal breath sounds. No wheezing, " rhonchi or rales.

## 2025-06-30 NOTE — ASSESSMENT & PLAN NOTE
Patient states that she has cut back to using approximately 4 cigarettes daily  Ongoing cessation encouraged

## 2025-06-30 NOTE — ASSESSMENT & PLAN NOTE
Lab Results   Component Value Date    HGBA1C 7.4 (H) 06/24/2025     Current medications: Metformin, glimepiride  Statin: Yes  Albuminuria/ACEi/ARB: No significant microalbuminuria  Retinopathy: Up-to-date, no evidence of retinopathy on recent exam    A1c remains just above goal  Continue current regimen for now      Orders:  •  CBC and differential; Future  •  Hemoglobin A1C; Future  •  Basic metabolic panel; Future

## 2025-06-30 NOTE — ASSESSMENT & PLAN NOTE
140/62 today, 150/76 on my recheck.  Will increase lisinopril to 20 mg daily.  Continue with amlodipine 5 mg daily    Reassess at follow-up    Orders:  •  lisinopril (ZESTRIL) 20 mg tablet; Take 1 tablet (20 mg total) by mouth daily

## 2025-06-30 NOTE — ASSESSMENT & PLAN NOTE
Stable, mild anemia.  Possibly related to iron deficiency.  Ferritin level remains stable on most recent labs at 30.  She continues with oral iron supplement.    Associated leukocytosis and thrombocytosis.  Possibly reactive thrombocytosis?  Elevated neutrophil count possibly related to tobacco use    Patient has deferred endoscopic evaluation for now.  Will continue to address    Plan:  - Continue to monitor for now.  Continue iron supplementation  -Will continue to address need for EGD/colonoscopy  -Continue to monitor leukocytosis/thrombocytosis  -Most recent lymphocyte count stable just around 4500.  If she continues with persistent lymphocytosis or this progresses, can also consider flow cytometry       Orders:  •  Sedimentation rate, automated; Future  •  C-reactive protein; Future  •  Peripheral Smear; Future  •  Iron Panel (Includes Ferritin, Iron Sat%, Iron, and TIBC); Future

## 2025-08-03 DIAGNOSIS — E11.9 TYPE 2 DIABETES MELLITUS, WITHOUT LONG-TERM CURRENT USE OF INSULIN (HCC): ICD-10-CM

## 2025-08-04 ENCOUNTER — TELEPHONE (OUTPATIENT)
Dept: INTERNAL MEDICINE CLINIC | Facility: CLINIC | Age: 60
End: 2025-08-04

## 2025-08-04 RX ORDER — GLIMEPIRIDE 2 MG/1
2 TABLET ORAL
Qty: 90 TABLET | Refills: 2 | Status: SHIPPED | OUTPATIENT
Start: 2025-08-04

## (undated) DEVICE — PENUMBRA ENGINE CANISTER

## (undated) DEVICE — PINNACLE R/O II INTRODUCER SHEATH WITH RADIOPAQUE MARKER: Brand: PINNACLE

## (undated) DEVICE — ADHESIVE SKIN HIGH VISCOSITY EXOFIN 1ML

## (undated) DEVICE — SUT PROLENE 3-0 PS1 18 IN 8663G

## (undated) DEVICE — WET SKIN PREP TRAY: Brand: MEDLINE INDUSTRIES, INC.

## (undated) DEVICE — PROBE COVER: Brand: STERILE PROBE COVER

## (undated) DEVICE — BETHLEHEM UNIVERSAL  MIONR EXT: Brand: CARDINAL HEALTH

## (undated) DEVICE — VESSEL CANNULA

## (undated) DEVICE — PADDING CAST 4 IN  COTTON STRL

## (undated) DEVICE — SHEATH INTRO DRY SEAL 16FR 33CM

## (undated) DEVICE — BLADE SAGITTAL 25.6 X 9.5MM

## (undated) DEVICE — COBAN 4 IN STERILE

## (undated) DEVICE — 3M™ IOBAN™ 2 ANTIMICROBIAL INCISE DRAPE 6650EZ: Brand: IOBAN™ 2

## (undated) DEVICE — SYRINGE 10ML LL

## (undated) DEVICE — CATH BAL STERLING OTW 5 X 60MM X 135CM

## (undated) DEVICE — STERILE ICS CARDIOVASCULAR PK: Brand: CARDINAL HEALTH

## (undated) DEVICE — CATH BAL STERLING OTW 3 X 220MM X 150CM

## (undated) DEVICE — NEEDLE 18 G X 1 1/2

## (undated) DEVICE — Device

## (undated) DEVICE — SUT VICRYL 2-0 SH 27 IN UNDYED J417H

## (undated) DEVICE — NEEDLE 25G X 1 1/2

## (undated) DEVICE — PINNACLE INTRODUCER SHEATH: Brand: PINNACLE

## (undated) DEVICE — GLOVE SRG BIOGEL 7.5

## (undated) DEVICE — GLOVE SRG BIOGEL 7

## (undated) DEVICE — SPONGE LAP 18 X 18 IN STRL RFD

## (undated) DEVICE — ACE WRAP 4 IN UNSTERILE

## (undated) DEVICE — NAVICROSS SUPPORT CATHETER: Brand: NAVICROSS

## (undated) DEVICE — GUIDEWIRE LUNDERQUIST TSCMG-35-260-7-LES

## (undated) DEVICE — SUT PROLENE 5-0 C-1/C-1 36 IN 8720H

## (undated) DEVICE — SPONGE 4 X 4 XRAY 16 PLY STRL LF RFD

## (undated) DEVICE — COBAN 6 IN STERILE

## (undated) DEVICE — 5 F TEMPO AQUA .038 INCHES 125CM STR: Brand: TEMPO AQUA

## (undated) DEVICE — INTENDED FOR TISSUE SEPARATION, AND OTHER PROCEDURES THAT REQUIRE A SHARP SURGICAL BLADE TO PUNCTURE OR CUT.: Brand: BARD-PARKER ® CARBON RIB-BACK BLADES

## (undated) DEVICE — SCD SEQUENTIAL COMPRESSION COMFORT SLEEVE MEDIUM KNEE LENGTH: Brand: KENDALL SCD

## (undated) DEVICE — CAST PADDING 4 IN SYNTHETIC NON-STRL

## (undated) DEVICE — OCCLUSIVE GAUZE STRIP,3% BISMUTH TRIBROMOPHENATE IN PETROLATUM BLEND: Brand: XEROFORM

## (undated) DEVICE — CUFF TOURNIQUET 18 X 4 IN QUICK CONNECT DISP 1 BLADDER

## (undated) DEVICE — 5 F TEMPO AQUA 0.038 INCHES 65CM VER: Brand: TEMPO AQUA

## (undated) DEVICE — GAUZE SPONGES,16 PLY: Brand: CURITY

## (undated) DEVICE — CATH DIAG 5FR .035 70CM PIG CSC 20

## (undated) DEVICE — SUT MONOCRYL 4-0 PS-2 27 IN Y426H

## (undated) DEVICE — PLUMEPEN PRO 10FT

## (undated) DEVICE — 2000CC GUARDIAN II: Brand: GUARDIAN

## (undated) DEVICE — PRESTO™ INFLATION DEVICE: Brand: PRESTO

## (undated) DEVICE — KERLIX BANDAGE ROLL: Brand: KERLIX

## (undated) DEVICE — SNAP KOVER: Brand: UNBRANDED

## (undated) DEVICE — RADIFOCUS GLIDEWIRE ADVANTAGE GUIDEWIRE: Brand: GLIDEWIRE ADVANTAGE

## (undated) DEVICE — SUT SILK 2-0 30 IN A305H

## (undated) DEVICE — MICROPUNCTURE 501

## (undated) DEVICE — GUIDEWIRE WITH ICE™ HYDROPHILIC COATING: Brand: V-18™ CONTROL WIRE™

## (undated) DEVICE — SINGLE PORT MANIFOLD: Brand: NEPTUNE 2

## (undated) DEVICE — BETHLEHEM UNIVERSAL LAPAROTOMY: Brand: CARDINAL HEALTH

## (undated) DEVICE — ABDOMINAL PAD: Brand: DERMACEA

## (undated) DEVICE — STOCKINETTE REGULAR

## (undated) DEVICE — PREP PAD BNS: Brand: CONVERTORS

## (undated) DEVICE — SUT MONOCRYL 2-0 CT-1 27 IN Y339H

## (undated) DEVICE — DESTINATION PERIPHERAL GUIDING SHEATH: Brand: DESTINATION

## (undated) DEVICE — FLEXCIL HIGH PRESSURE CONTRAST INJECTION LINE: Brand: NAMIC

## (undated) DEVICE — DISPOSABLE OR TOWEL: Brand: CARDINAL HEALTH

## (undated) DEVICE — 10FR FRAZIER SUCTION HANDLE: Brand: CARDINAL HEALTH

## (undated) DEVICE — GLOVE INDICATOR PI UNDERGLOVE SZ 7.5 BLUE

## (undated) DEVICE — TRAY FOLEY 16FR URIMETER SURESTEP

## (undated) DEVICE — CATH BAL CHARGER 8 X 40MM X 75CM

## (undated) DEVICE — PACK UNIVERSAL DRAPES SUB-Q ICD

## (undated) DEVICE — CATH SOFT-VU OMNI FLUSH 4FR 65CM

## (undated) DEVICE — 4F TEMPO AQUA .038 INCHES 125CM VERT: Brand: TEMPO AQUA

## (undated) DEVICE — CATH SUPPORT RUBICON 4FR 0.018IN 150CM STR

## (undated) DEVICE — INTENDED FOR TISSUE SEPARATION, AND OTHER PROCEDURES THAT REQUIRE A SHARP SURGICAL BLADE TO PUNCTURE OR CUT.: Brand: BARD-PARKER SAFETY BLADES SIZE 15, STERILE